# Patient Record
Sex: FEMALE | Race: BLACK OR AFRICAN AMERICAN | Employment: FULL TIME | URBAN - METROPOLITAN AREA
[De-identification: names, ages, dates, MRNs, and addresses within clinical notes are randomized per-mention and may not be internally consistent; named-entity substitution may affect disease eponyms.]

---

## 2017-01-04 ENCOUNTER — TELEPHONE (OUTPATIENT)
Dept: OBGYN CLINIC | Facility: CLINIC | Age: 42
End: 2017-01-04

## 2017-01-04 DIAGNOSIS — Z12.31 ENCOUNTER FOR SCREENING MAMMOGRAM FOR MALIGNANT NEOPLASM OF BREAST: Primary | ICD-10-CM

## 2017-01-21 NOTE — LETTER
Date: 3/13/2018    Patient Name: Tatum Buck          To Whom it may concern: This letter has been written at the patient's request. The above patient was seen at the [facility] for treatment of a medical condition.     This patient no

## 2017-01-24 ENCOUNTER — HOSPITAL ENCOUNTER (OUTPATIENT)
Dept: MAMMOGRAPHY | Facility: HOSPITAL | Age: 42
Discharge: HOME | End: 2017-01-24
Attending: OBSTETRICS & GYNECOLOGY
Payer: COMMERCIAL

## 2017-01-24 DIAGNOSIS — Z12.31 ENCOUNTER FOR SCREENING MAMMOGRAM FOR MALIGNANT NEOPLASM OF BREAST: ICD-10-CM

## 2017-01-24 PROCEDURE — 77067 SCR MAMMO BI INCL CAD: CPT

## 2017-02-08 ENCOUNTER — HOSPITAL ENCOUNTER (OUTPATIENT)
Dept: GENERAL RADIOLOGY | Age: 42
Discharge: HOME | End: 2017-02-08
Attending: FAMILY MEDICINE
Payer: COMMERCIAL

## 2017-02-08 ENCOUNTER — APPOINTMENT (OUTPATIENT)
Dept: LAB | Facility: REFERENCE LAB | Age: 42
End: 2017-02-08
Attending: FAMILY MEDICINE
Payer: COMMERCIAL

## 2017-02-08 ENCOUNTER — OFFICE VISIT (OUTPATIENT)
Dept: OBGYN CLINIC | Facility: CLINIC | Age: 42
End: 2017-02-08

## 2017-02-08 VITALS
SYSTOLIC BLOOD PRESSURE: 116 MMHG | RESPIRATION RATE: 16 BRPM | WEIGHT: 191 LBS | HEART RATE: 69 BPM | BODY MASS INDEX: 31 KG/M2 | DIASTOLIC BLOOD PRESSURE: 70 MMHG | TEMPERATURE: 98 F

## 2017-02-08 DIAGNOSIS — G89.29 CHRONIC PAIN OF BOTH KNEES: ICD-10-CM

## 2017-02-08 DIAGNOSIS — M54.50 CHRONIC BILATERAL LOW BACK PAIN WITHOUT SCIATICA: ICD-10-CM

## 2017-02-08 DIAGNOSIS — M72.2 PLANTAR FASCIITIS OF LEFT FOOT: ICD-10-CM

## 2017-02-08 DIAGNOSIS — M25.562 CHRONIC PAIN OF BOTH KNEES: ICD-10-CM

## 2017-02-08 DIAGNOSIS — M25.561 CHRONIC PAIN OF BOTH KNEES: ICD-10-CM

## 2017-02-08 DIAGNOSIS — M77.42 METATARSALGIA OF LEFT FOOT: ICD-10-CM

## 2017-02-08 DIAGNOSIS — K64.4 EXTERNAL HEMORRHOIDS: Primary | ICD-10-CM

## 2017-02-08 DIAGNOSIS — G89.29 CHRONIC BILATERAL LOW BACK PAIN WITHOUT SCIATICA: ICD-10-CM

## 2017-02-08 DIAGNOSIS — B35.1 ONYCHOMYCOSIS: ICD-10-CM

## 2017-02-08 LAB
ALBUMIN SERPL BCP-MCNC: 3.5 G/DL (ref 3.5–4.8)
ALBUMIN/GLOB SERPL: 1 {RATIO} (ref 1–2)
ALP SERPL-CCNC: 67 U/L (ref 32–100)
ALT SERPL-CCNC: 15 U/L (ref 14–54)
ANION GAP SERPL CALC-SCNC: 7 MMOL/L (ref 0–18)
AST SERPL-CCNC: 21 U/L (ref 15–41)
BILIRUB SERPL-MCNC: 0.6 MG/DL (ref 0.3–1.2)
BUN SERPL-MCNC: 7 MG/DL (ref 8–20)
BUN/CREAT SERPL: 8.8 (ref 10–20)
CALCIUM SERPL-MCNC: 9 MG/DL (ref 8.5–10.5)
CHLORIDE SERPL-SCNC: 102 MMOL/L (ref 95–110)
CO2 SERPL-SCNC: 29 MMOL/L (ref 22–32)
CREAT SERPL-MCNC: 0.8 MG/DL (ref 0.5–1.5)
GLOBULIN PLAS-MCNC: 3.6 G/DL (ref 2.5–3.7)
GLUCOSE SERPL-MCNC: 82 MG/DL (ref 70–99)
OSMOLALITY UR CALC.SUM OF ELEC: 283 MOSM/KG (ref 275–295)
POTASSIUM SERPL-SCNC: 3.4 MMOL/L (ref 3.3–5.1)
PROT SERPL-MCNC: 7.1 G/DL (ref 5.9–8.4)
SODIUM SERPL-SCNC: 138 MMOL/L (ref 136–144)

## 2017-02-08 PROCEDURE — 80053 COMPREHEN METABOLIC PANEL: CPT | Performed by: FAMILY MEDICINE

## 2017-02-08 PROCEDURE — 73560 X-RAY EXAM OF KNEE 1 OR 2: CPT

## 2017-02-08 PROCEDURE — 99203 OFFICE O/P NEW LOW 30 MIN: CPT | Performed by: FAMILY MEDICINE

## 2017-02-08 NOTE — PROGRESS NOTES
CC:  Patient presents with:  New Patient: hemorrhoids, feet problem, and back pain due to large breast      HPI: 46year old female here to discuss hemorrhoids, feet problems, and back pain.   Reports her breasts are very large and therefore she gets back p knee swelling with right knee pain going up and down stairs. No prior injuries.      ROS:  General:  No fever, no fatigue, no weight changes  GI:  No N/V/D, chronic constipation, hemorrhoids, no hematochezia or melena  Dermatologic:  Right toenail fungus bleeding, external hemorrhoids   Dermatologic:  Right toe onychomycosis   MSK: Increased lumbar lordosis, upper trapezius and lower lumbar paraspinal vertebral musculature hypertonicity. Left plantar fascia tenderness to palpation, no calf tenderness.  Tend and wear orthotics, especially while at work  - Also to avoid high heels as this will worsen the problem  - If no improvement with above measures, plan x-ray and possible podiatry referral    Return for annual physical or sooner as needed.      Galaviz Sides,

## 2017-02-09 NOTE — PATIENT INSTRUCTIONS
Treating Plantar Fasciitis  First, your healthcare provider tries to determine the cause of your problem in order to suggest ways to relieve pain. If your pain is due to poor foot mechanics, custom-made shoe inserts (orthoses) may help.     Reduce symptom

## 2017-02-10 ENCOUNTER — TELEPHONE (OUTPATIENT)
Dept: OBGYN CLINIC | Facility: CLINIC | Age: 42
End: 2017-02-10

## 2017-02-10 DIAGNOSIS — B35.1 ONYCHOMYCOSIS OF RIGHT GREAT TOE: Primary | ICD-10-CM

## 2017-02-10 RX ORDER — TERBINAFINE HYDROCHLORIDE 250 MG/1
250 TABLET ORAL DAILY
Qty: 90 TABLET | Refills: 0 | Status: SHIPPED | OUTPATIENT
Start: 2017-02-10 | End: 2017-02-24

## 2017-02-21 ENCOUNTER — TELEPHONE (OUTPATIENT)
Dept: OBGYN CLINIC | Facility: CLINIC | Age: 42
End: 2017-02-21

## 2017-02-22 ENCOUNTER — TELEPHONE (OUTPATIENT)
Dept: OBGYN CLINIC | Facility: CLINIC | Age: 42
End: 2017-02-22

## 2017-02-22 NOTE — TELEPHONE ENCOUNTER
Received a return of [facility] paperwork that was filled out two weeks ago asking for one more date patient was treated for current condition as patient must have been seen twice prior to approval of [facility].  Listed April 2016 as a separate date as she was seen by

## 2017-02-24 ENCOUNTER — TELEPHONE (OUTPATIENT)
Dept: OBGYN CLINIC | Facility: CLINIC | Age: 42
End: 2017-02-24

## 2017-02-24 RX ORDER — TERBINAFINE HYDROCHLORIDE 250 MG/1
250 TABLET ORAL DAILY
Qty: 90 TABLET | Refills: 0 | Status: SHIPPED | OUTPATIENT
Start: 2017-02-24 | End: 2017-05-25

## 2017-02-24 NOTE — TELEPHONE ENCOUNTER
Sent Terbinafine into [facility] instead of [facility] [facility] or [facility] as prior authorization was denied and it is only $10 at [facility]. Patient notified and will go pick it up soon.

## 2017-03-28 ENCOUNTER — HOSPITAL ENCOUNTER (OUTPATIENT)
Age: 42
Discharge: HOME | End: 2017-03-28
Payer: COMMERCIAL

## 2017-03-28 VITALS
RESPIRATION RATE: 20 BRPM | TEMPERATURE: 98 F | DIASTOLIC BLOOD PRESSURE: 58 MMHG | HEART RATE: 63 BPM | SYSTOLIC BLOOD PRESSURE: 118 MMHG | OXYGEN SATURATION: 100 %

## 2017-03-28 DIAGNOSIS — J06.9 VIRAL UPPER RESPIRATORY INFECTION: ICD-10-CM

## 2017-03-28 DIAGNOSIS — J02.9 ACUTE VIRAL PHARYNGITIS: Primary | ICD-10-CM

## 2017-03-28 LAB — S PYO AG THROAT QL: NEGATIVE

## 2017-03-28 PROCEDURE — 99212 OFFICE O/P EST SF 10 MIN: CPT

## 2017-03-28 PROCEDURE — 87430 STREP A AG IA: CPT

## 2017-03-28 PROCEDURE — 99202 OFFICE O/P NEW SF 15 MIN: CPT

## 2017-03-28 NOTE — ED PROVIDER NOTES
Patient Seen in: [address]    History   Patient presents with:  Cough/URI    Stated Complaint: sore throat, body aches    HPI    Patient is a 29-year-old female without contributory past medical history presents with co Triage Vitals   BP 03/28/17 1813 118/58 mmHg   Pulse 03/28/17 1813 63   Resp 03/28/17 1813 20   Temp 03/28/17 1813 98.2 °F (36.8 °C)   Temp src 03/28/17 1813 Oral   SpO2 03/28/17 1813 100 %   O2 Device 03/28/17 1813 None (Room air)       Current:/58

## 2017-03-28 NOTE — ED INITIAL ASSESSMENT (HPI)
Patient complains of upper respiratory symptoms, cough, congestion, sore throat, aches, diarrhea for the past two days. Denies fevers, nausea, vomiting.

## 2017-05-05 PROBLEM — K64.4 EXTERNAL HEMORRHOIDS WITH COMPLICATION: Status: ACTIVE | Noted: 2017-05-05

## 2017-05-05 PROBLEM — K62.5 RECTAL BLEEDING: Status: ACTIVE | Noted: 2017-05-05

## 2017-05-05 PROBLEM — K64.8 INTERNAL HEMORRHOIDS WITH COMPLICATION: Status: ACTIVE | Noted: 2017-05-05

## 2017-05-21 ENCOUNTER — HOSPITAL ENCOUNTER (OUTPATIENT)
Age: 42
Discharge: HOME | End: 2017-05-21
Attending: EMERGENCY MEDICINE
Payer: COMMERCIAL

## 2017-05-21 VITALS
TEMPERATURE: 98 F | BODY MASS INDEX: 30.86 KG/M2 | OXYGEN SATURATION: 100 % | DIASTOLIC BLOOD PRESSURE: 75 MMHG | HEART RATE: 71 BPM | RESPIRATION RATE: 20 BRPM | WEIGHT: 192 LBS | SYSTOLIC BLOOD PRESSURE: 109 MMHG | HEIGHT: 66 IN

## 2017-05-21 DIAGNOSIS — N30.00 ACUTE CYSTITIS WITHOUT HEMATURIA: Primary | ICD-10-CM

## 2017-05-21 PROCEDURE — 81025 URINE PREGNANCY TEST: CPT

## 2017-05-21 PROCEDURE — 99214 OFFICE O/P EST MOD 30 MIN: CPT

## 2017-05-21 PROCEDURE — 99213 OFFICE O/P EST LOW 20 MIN: CPT

## 2017-05-21 PROCEDURE — 81002 URINALYSIS NONAUTO W/O SCOPE: CPT

## 2017-05-21 RX ORDER — SULFAMETHOXAZOLE AND TRIMETHOPRIM 800; 160 MG/1; MG/1
1 TABLET ORAL 2 TIMES DAILY
Qty: 6 TABLET | Refills: 0 | Status: SHIPPED | OUTPATIENT
Start: 2017-05-21 | End: 2017-05-24

## 2017-05-21 RX ORDER — PHENAZOPYRIDINE HYDROCHLORIDE 200 MG/1
200 TABLET, FILM COATED ORAL 3 TIMES DAILY PRN
Qty: 6 TABLET | Refills: 0 | Status: SHIPPED | OUTPATIENT
Start: 2017-05-21 | End: 2017-05-28

## 2017-05-21 RX ORDER — SULFAMETHOXAZOLE AND TRIMETHOPRIM 800; 160 MG/1; MG/1
1 TABLET ORAL 2 TIMES DAILY
Qty: 10 TABLET | Refills: 0 | Status: SHIPPED | OUTPATIENT
Start: 2017-05-21 | End: 2017-05-21

## 2017-05-21 NOTE — ED INITIAL ASSESSMENT (HPI)
Pt rpts dysuria, hematuria, and low abd pain  Since last night.  Denies fevers, chills, back pain, or vag d/c

## 2017-05-21 NOTE — ED PROVIDER NOTES
Patient Seen in: [address]    History   Patient presents with:  Urinary Symptoms (urologic)    Stated Complaint: Possible UTI    HPI    Patient presents to the urgent care center today complaining of 24 hours of frequen BP 05/21/17 1008 109/75 mmHg   Pulse 05/21/17 1008 71   Resp 05/21/17 1008 20   Temp 05/21/17 1008 98.3 °F (36.8 °C)   Temp src 05/21/17 1008 Oral   SpO2 05/21/17 1008 100 %   O2 Device 05/21/17 1008 None (Room air)       Current:/75 mmHg  Pulse 71 Tab per tablet  Take 1 tablet by mouth 2 (two) times daily. Qty: 10 tablet Refills: 0    Phenazopyridine HCl 200 MG Oral Tab  Take 1 tablet (200 mg total) by mouth 3 (three) times daily as needed for Pain.   Qty: 6 tablet Refills: 0

## 2017-06-07 ENCOUNTER — OFFICE VISIT (OUTPATIENT)
Dept: FAMILY MEDICINE CLINIC | Facility: CLINIC | Age: 42
End: 2017-06-07

## 2017-06-07 VITALS
BODY MASS INDEX: 31.98 KG/M2 | DIASTOLIC BLOOD PRESSURE: 72 MMHG | RESPIRATION RATE: 16 BRPM | SYSTOLIC BLOOD PRESSURE: 132 MMHG | HEIGHT: 66 IN | WEIGHT: 199 LBS | HEART RATE: 78 BPM | OXYGEN SATURATION: 98 %

## 2017-06-07 DIAGNOSIS — M77.42 METATARSALGIA OF LEFT FOOT: ICD-10-CM

## 2017-06-07 DIAGNOSIS — Z87.440 HISTORY OF UTI: ICD-10-CM

## 2017-06-07 DIAGNOSIS — M72.2 PLANTAR FASCIITIS, LEFT: Primary | ICD-10-CM

## 2017-06-07 PROCEDURE — 99213 OFFICE O/P EST LOW 20 MIN: CPT | Performed by: FAMILY MEDICINE

## 2017-06-07 RX ORDER — TERBINAFINE HYDROCHLORIDE 250 MG/1
250 TABLET ORAL DAILY
COMMUNITY
End: 2017-11-15 | Stop reason: ALTCHOICE

## 2017-06-07 NOTE — PROGRESS NOTES
CC:  Patient presents with: Follow - Up: UTI on 5/21/17-urine is still cloudy  Foot Pain: left foot, would like a referral to a specialist      HPI: 38year old female here to follow-up on UTI from 5/21 and with persistent left foot pain.   Took Bactrim fo Rfl:        Review of patient's allergies indicates no known allergies. Vitals:    06/07/17  1750   BP: 132/72   Pulse: 78   Resp: 16   Height: 66\"   Weight: 199 lb   SpO2: 98%       Body mass index is 32.13 kg/(m^2).     Physical:  General:  Alert, a dipstick    Return for physical in July or sooner as needed.      Romel Uriarte DO  06/07/2017  6:06 PM

## 2017-06-19 ENCOUNTER — HOSPITAL ENCOUNTER (OUTPATIENT)
Age: 42
Discharge: HOME | End: 2017-06-19
Attending: FAMILY MEDICINE
Payer: COMMERCIAL

## 2017-06-19 VITALS
HEART RATE: 95 BPM | RESPIRATION RATE: 20 BRPM | SYSTOLIC BLOOD PRESSURE: 116 MMHG | DIASTOLIC BLOOD PRESSURE: 82 MMHG | TEMPERATURE: 98 F | OXYGEN SATURATION: 98 %

## 2017-06-19 DIAGNOSIS — J02.0 STREPTOCOCCAL SORE THROAT: Primary | ICD-10-CM

## 2017-06-19 PROCEDURE — 99213 OFFICE O/P EST LOW 20 MIN: CPT

## 2017-06-19 PROCEDURE — 99214 OFFICE O/P EST MOD 30 MIN: CPT

## 2017-06-19 PROCEDURE — 87430 STREP A AG IA: CPT

## 2017-06-19 RX ORDER — PREDNISONE 20 MG/1
40 TABLET ORAL DAILY
Qty: 10 TABLET | Refills: 0 | Status: SHIPPED | OUTPATIENT
Start: 2017-06-19 | End: 2017-06-24

## 2017-06-19 RX ORDER — ACETAMINOPHEN AND CODEINE PHOSPHATE 300; 30 MG/1; MG/1
1-2 TABLET ORAL EVERY 4 HOURS PRN
Qty: 20 TABLET | Refills: 0 | Status: SHIPPED | OUTPATIENT
Start: 2017-06-19 | End: 2017-06-26

## 2017-06-19 RX ORDER — CEFDINIR 300 MG/1
300 CAPSULE ORAL 2 TIMES DAILY
Qty: 20 CAPSULE | Refills: 0 | Status: SHIPPED | OUTPATIENT
Start: 2017-06-19 | End: 2017-06-29

## 2017-06-20 NOTE — ED PROVIDER NOTES
Patient Seen in: [address]    History   Patient presents with:  Sore Throat    Stated Complaint: SORE THROAT    HPI    Patient here with sore throat for 2 days. No travel, denies sick contacts .   Patient denies sig lashay 1940 98 %   O2 Device 06/19/17 1940 None (Room air)       Current:/82 mmHg  Pulse 95  Temp(Src) 98.4 °F (36.9 °C) (Oral)  Resp 20  SpO2 98%  LMP 05/06/2017 (Exact Date)    GENERAL: well developed, well nourished, well hydrated, no distress  HEAD: nor

## 2017-06-20 NOTE — ED INITIAL ASSESSMENT (HPI)
Patient comes in with complaint of sore throat which she felt started on Saturday. Associated fever and chills yesterday. Denies other respiratory symptoms.

## 2017-07-10 ENCOUNTER — HOSPITAL ENCOUNTER (OUTPATIENT)
Age: 42
Discharge: HOME | End: 2017-07-10
Payer: COMMERCIAL

## 2017-07-10 VITALS
DIASTOLIC BLOOD PRESSURE: 80 MMHG | HEART RATE: 67 BPM | OXYGEN SATURATION: 100 % | TEMPERATURE: 99 F | RESPIRATION RATE: 19 BRPM | SYSTOLIC BLOOD PRESSURE: 122 MMHG

## 2017-07-10 DIAGNOSIS — J32.0 RIGHT MAXILLARY SINUSITIS: Primary | ICD-10-CM

## 2017-07-10 LAB — S PYO AG THROAT QL: NEGATIVE

## 2017-07-10 PROCEDURE — 99213 OFFICE O/P EST LOW 20 MIN: CPT

## 2017-07-10 PROCEDURE — 87430 STREP A AG IA: CPT

## 2017-07-10 PROCEDURE — 99214 OFFICE O/P EST MOD 30 MIN: CPT

## 2017-07-10 RX ORDER — PREDNISONE 20 MG/1
40 TABLET ORAL DAILY
Qty: 8 TABLET | Refills: 0 | Status: SHIPPED | OUTPATIENT
Start: 2017-07-10 | End: 2017-07-14

## 2017-07-10 RX ORDER — CEFDINIR 300 MG/1
300 CAPSULE ORAL 2 TIMES DAILY
Qty: 20 CAPSULE | Refills: 0 | Status: SHIPPED | OUTPATIENT
Start: 2017-07-10 | End: 2017-07-20

## 2017-07-10 NOTE — ED INITIAL ASSESSMENT (HPI)
Pt here with complaints of sore throat , pt states she just had strep throat last week and finished abx, pt states she has been feeling sluggish, pt states it feels \"tight\" when she swallows , pt is having a productive cough

## 2017-07-11 NOTE — ED PROVIDER NOTES
Patient Seen in: [address]    History   Patient presents with:  Sore Throat    Stated Complaint: Sore throat, cough    HPI    The patient is a 53-year-old female who presents with complaints of sore throat, cough, green cough  Other systems are as noted in HPI. Constitutional and vital signs reviewed. All other systems reviewed and negative except as noted above. PSFH elements reviewed from today and agreed except as otherwise stated in HPI.     Physical Exam   ED daily.  Qty: 8 tablet Refills: 0

## 2017-07-18 ENCOUNTER — OFFICE VISIT (OUTPATIENT)
Dept: FAMILY MEDICINE CLINIC | Facility: CLINIC | Age: 42
End: 2017-07-18

## 2017-07-18 VITALS
DIASTOLIC BLOOD PRESSURE: 80 MMHG | WEIGHT: 196 LBS | HEART RATE: 79 BPM | OXYGEN SATURATION: 98 % | BODY MASS INDEX: 31.5 KG/M2 | SYSTOLIC BLOOD PRESSURE: 132 MMHG | RESPIRATION RATE: 18 BRPM | HEIGHT: 66 IN

## 2017-07-18 DIAGNOSIS — S46.812A STRAIN OF LEFT TRAPEZIUS MUSCLE, INITIAL ENCOUNTER: Primary | ICD-10-CM

## 2017-07-18 PROCEDURE — 99213 OFFICE O/P EST LOW 20 MIN: CPT | Performed by: FAMILY MEDICINE

## 2017-07-18 RX ORDER — METHYLPREDNISOLONE 4 MG/1
TABLET ORAL
Qty: 1 KIT | Refills: 0 | Status: SHIPPED | OUTPATIENT
Start: 2017-07-18 | End: 2017-11-15 | Stop reason: ALTCHOICE

## 2017-07-18 RX ORDER — CYCLOBENZAPRINE HCL 10 MG
10 TABLET ORAL NIGHTLY PRN
Qty: 10 TABLET | Refills: 0 | Status: SHIPPED | OUTPATIENT
Start: 2017-07-18 | End: 2017-07-25

## 2017-07-18 NOTE — PROGRESS NOTES
CC:  Patient presents with:  Back Pain: pain started yesterday after picking a watermelon. pain is constant and gets worse with any movement      HPI: 38year old female here with acute onset low back pain that has been constant since injury yesterday.   Be total) by mouth nightly as needed for Muscle spasms. Disp: 10 tablet Rfl: 0   cefdinir 300 MG Oral Cap Take 1 capsule (300 mg total) by mouth 2 (two) times daily. Disp: 20 capsule Rfl: 0   Terbinafine HCl 250 MG Oral Tab Take 250 mg by mouth daily.  Disp:

## 2017-07-18 NOTE — PATIENT INSTRUCTIONS
Self-Care for Strains and Sprains  Most minor strains and sprains can be treated with self-care. Recovering from a strain or sprain may take 6 to 8 weeks. Your self-care goal is to reduce pain and immobilize the injury to speed healing.      A sprain inju · Pain increases or doesn’t improve in 4 days. · When pressing along the injured area, you notice a spot that is especially painful. Date Last Reviewed: 9/29/2015  © 8255-8136 The [facility], [address] [place] [address].  All rig

## 2017-08-16 ENCOUNTER — TELEPHONE (OUTPATIENT)
Dept: FAMILY MEDICINE CLINIC | Facility: CLINIC | Age: 42
End: 2017-08-16

## 2017-08-16 PROBLEM — K62.5 RECTAL BLEEDING: Status: RESOLVED | Noted: 2017-05-05 | Resolved: 2017-08-16

## 2017-08-17 NOTE — TELEPHONE ENCOUNTER
Mom brought in [place] paperwork to be home with her son intermittently when he is sick. Forms filled out, faxed, and put in envelope for dad to  when he brings their son, Ghulam Bain, in for PPD reading on Saturday, 8/19.

## 2017-09-08 ENCOUNTER — OFFICE VISIT (OUTPATIENT)
Dept: OTOLARYNGOLOGY | Facility: CLINIC | Age: 42
End: 2017-09-08

## 2017-09-08 VITALS
HEIGHT: 66 IN | BODY MASS INDEX: 30.86 KG/M2 | TEMPERATURE: 98 F | DIASTOLIC BLOOD PRESSURE: 74 MMHG | WEIGHT: 192 LBS | SYSTOLIC BLOOD PRESSURE: 110 MMHG

## 2017-09-08 DIAGNOSIS — J02.9 SORE THROAT: Primary | ICD-10-CM

## 2017-09-08 PROCEDURE — 99243 OFF/OP CNSLTJ NEW/EST LOW 30: CPT | Performed by: OTOLARYNGOLOGY

## 2017-09-08 PROCEDURE — 99212 OFFICE O/P EST SF 10 MIN: CPT | Performed by: OTOLARYNGOLOGY

## 2017-09-08 RX ORDER — MONTELUKAST SODIUM 10 MG/1
10 TABLET ORAL NIGHTLY
Qty: 30 TABLET | Refills: 3 | Status: SHIPPED | OUTPATIENT
Start: 2017-09-08 | End: 2018-06-27

## 2017-09-08 RX ORDER — FLUTICASONE PROPIONATE 50 MCG
1 SPRAY, SUSPENSION (ML) NASAL
Status: ON HOLD | COMMUNITY
Start: 2015-12-21 | End: 2018-06-28

## 2017-09-08 RX ORDER — PROMETHAZINE HYDROCHLORIDE AND CODEINE PHOSPHATE 6.25; 1 MG/5ML; MG/5ML
2.5 SYRUP ORAL EVERY 4 HOURS PRN
Qty: 250 ML | Refills: 0 | Status: SHIPPED | OUTPATIENT
Start: 2017-09-08 | End: 2017-11-15 | Stop reason: ALTCHOICE

## 2017-09-08 RX ORDER — FLUTICASONE PROPIONATE 50 MCG
1 SPRAY, SUSPENSION (ML) NASAL 2 TIMES DAILY
Qty: 1 BOTTLE | Refills: 3 | Status: SHIPPED | OUTPATIENT
Start: 2017-09-08 | End: 2017-11-15

## 2017-09-08 NOTE — PROGRESS NOTES
Isac Tubbs is a 38year old female. Patient presents with:  Snoring  Tonsil Problem: enlarged  tonsil,       HISTORY OF PRESENT ILLNESS    She presents with problems since March of this year with chronic throat discomfort.   She is having paroxysmal BANDING INTERAL HEMORRHOID      REVIEW OF SYSTEMS    System Neg/Pos Details   Constitutional Negative Fatigue, fever and weight loss. ENMT Negative Drooling. Eyes Negative Blurred vision and vision changes. Respiratory Negative Dyspnea and wheezing. Normal Left: Normal. Septum -Normal  Turbinates - Right: Normal, Left: Normal.  Nasal mucosa–congested       Current Outpatient Prescriptions:   •  Fluticasone Propionate 50 MCG/ACT Nasal Suspension, 1 spray by Nasal route., Disp: , Rfl:   •  promethazine-

## 2017-11-07 ENCOUNTER — HOSPITAL ENCOUNTER (OUTPATIENT)
Age: 42
Discharge: HOME | End: 2017-11-07
Attending: FAMILY MEDICINE
Payer: COMMERCIAL

## 2017-11-07 VITALS
TEMPERATURE: 98 F | SYSTOLIC BLOOD PRESSURE: 113 MMHG | WEIGHT: 202 LBS | OXYGEN SATURATION: 100 % | DIASTOLIC BLOOD PRESSURE: 76 MMHG | HEIGHT: 66 IN | HEART RATE: 59 BPM | RESPIRATION RATE: 12 BRPM | BODY MASS INDEX: 32.47 KG/M2

## 2017-11-07 DIAGNOSIS — R51.9 NONINTRACTABLE EPISODIC HEADACHE, UNSPECIFIED HEADACHE TYPE: Primary | ICD-10-CM

## 2017-11-07 DIAGNOSIS — J01.10 ACUTE NON-RECURRENT FRONTAL SINUSITIS: ICD-10-CM

## 2017-11-07 PROCEDURE — 99214 OFFICE O/P EST MOD 30 MIN: CPT

## 2017-11-07 PROCEDURE — 99213 OFFICE O/P EST LOW 20 MIN: CPT

## 2017-11-07 RX ORDER — AMOXICILLIN AND CLAVULANATE POTASSIUM 875; 125 MG/1; MG/1
1 TABLET, FILM COATED ORAL 2 TIMES DAILY
Qty: 20 TABLET | Refills: 0 | Status: SHIPPED | OUTPATIENT
Start: 2017-11-07 | End: 2017-11-17

## 2017-11-08 NOTE — ED PROVIDER NOTES
Patient Seen in: [address]    History   Patient presents with:  Headache (neurologic)    Stated Complaint: headache/dry mouth    HPI    38year old patient with PMHx significant for allergies presents with nasal congestio every 4 (four) hours as needed for cough. methylPREDNISolone (MEDROL) 4 MG Oral Tablet Therapy Pack,  As directed. Terbinafine HCl 250 MG Oral Tab,  Take 250 mg by mouth daily.    Multiple Vitamins-Minerals (CENTRUM) Oral Tab,  Take 1 tablet by mouth da not need any. Is scared to take it. Discussed concerns however patient still declines. Recommended close follow-up with her primary especially if headaches persist.  If they get worse or new symptoms develop instructed to go to the ER.     Patient verbal

## 2017-11-08 NOTE — ED NOTES
Discharge instructions reviewed with patient. Prescription sent to pharmacy. All questions answered to patient's satisfaction.

## 2017-11-08 NOTE — ED INITIAL ASSESSMENT (HPI)
Patient comes in with headache which has been going on for over a month. She thinks it may be sinus related. Also complains of a dry throat.  States she drinks and urinates per usual.

## 2017-11-15 ENCOUNTER — OFFICE VISIT (OUTPATIENT)
Dept: FAMILY MEDICINE CLINIC | Facility: CLINIC | Age: 42
End: 2017-11-15

## 2017-11-15 VITALS
WEIGHT: 203 LBS | OXYGEN SATURATION: 98 % | SYSTOLIC BLOOD PRESSURE: 112 MMHG | HEART RATE: 77 BPM | DIASTOLIC BLOOD PRESSURE: 74 MMHG | HEIGHT: 66 IN | BODY MASS INDEX: 32.62 KG/M2

## 2017-11-15 DIAGNOSIS — M54.50 CHRONIC BILATERAL LOW BACK PAIN WITHOUT SCIATICA: ICD-10-CM

## 2017-11-15 DIAGNOSIS — M77.11 LATERAL EPICONDYLITIS OF RIGHT ELBOW: ICD-10-CM

## 2017-11-15 DIAGNOSIS — G89.29 CHRONIC BILATERAL LOW BACK PAIN WITHOUT SCIATICA: ICD-10-CM

## 2017-11-15 DIAGNOSIS — N62 LARGE BREASTS: Primary | ICD-10-CM

## 2017-11-15 DIAGNOSIS — Z23 NEED FOR INFLUENZA VACCINATION: ICD-10-CM

## 2017-11-15 PROCEDURE — 99213 OFFICE O/P EST LOW 20 MIN: CPT | Performed by: FAMILY MEDICINE

## 2017-11-15 NOTE — PROGRESS NOTES
CC:  Patient presents with:  Back Pain: upper and lower back, would like to discuss breast reduction  Elbow Pain: right pain started after hurting arm with a door x1 month      HPI: 38year old female here to discuss back pain and goal for breast reduction nightly. Disp: 30 tablet Rfl: 3   Loratadine-Pseudoephedrine ER 5-120 MG Oral Tablet 12 Hr Take 1 tablet by mouth every 12 (twelve) hours. Disp: 60 tablet Rfl: 3   Multiple Vitamins-Minerals (CENTRUM) Oral Tab Take 1 tablet by mouth daily.  Disp:  Rfl:

## 2017-11-15 NOTE — PATIENT INSTRUCTIONS
Tendonitis  A tendon is the thick fibrous cord that joins muscle to bone and allows joints to move. When a tendon becomes inflamed, it is called tendonitis. This can occur from overuse, injury, or infection.  This usually involves the shoulders, forearm, © 3874-7447 The [address] [facility], [facility] [place]. All rights reserved. This information is not intended as a substitute for professional medical care. Always follow your healthcare professional's instructions.

## 2018-01-29 ENCOUNTER — OFFICE VISIT (OUTPATIENT)
Dept: FAMILY MEDICINE CLINIC | Facility: CLINIC | Age: 43
End: 2018-01-29

## 2018-01-29 ENCOUNTER — HOSPITAL ENCOUNTER (OUTPATIENT)
Dept: GENERAL RADIOLOGY | Age: 43
Discharge: HOME | End: 2018-01-29
Attending: FAMILY MEDICINE
Payer: COMMERCIAL

## 2018-01-29 VITALS — OXYGEN SATURATION: 98 % | HEART RATE: 67 BPM | SYSTOLIC BLOOD PRESSURE: 122 MMHG | DIASTOLIC BLOOD PRESSURE: 74 MMHG

## 2018-01-29 DIAGNOSIS — M25.511 ACUTE PAIN OF RIGHT SHOULDER: Primary | ICD-10-CM

## 2018-01-29 DIAGNOSIS — M25.511 ACUTE PAIN OF RIGHT SHOULDER: ICD-10-CM

## 2018-01-29 PROCEDURE — 99213 OFFICE O/P EST LOW 20 MIN: CPT | Performed by: FAMILY MEDICINE

## 2018-01-29 PROCEDURE — 73030 X-RAY EXAM OF SHOULDER: CPT | Performed by: FAMILY MEDICINE

## 2018-01-29 RX ORDER — NAPROXEN 500 MG/1
500 TABLET ORAL 2 TIMES DAILY WITH MEALS
Qty: 28 TABLET | Refills: 0 | Status: SHIPPED | OUTPATIENT
Start: 2018-01-29 | End: 2018-02-12

## 2018-01-29 NOTE — PROGRESS NOTES
CC:  Patient presents with:  Shoulder Pain: right- possibly from work x3 weeks      HPI: 38year old female here with right shoulder pain x 3 weeks.   Reports she first thought this pain started due to sleeping on her right shoulder, but the pain is not goi Sexual activity: Yes    Partners: Male     Other Topics Concern   None on file     Social History Narrative   None on file         Current Outpatient Prescriptions:  Fluticasone Propionate 50 MCG/ACT Nasal Suspension 1 spray by Nasal route.  Disp:  Rfl: DO  01/29/18  5:29 PM

## 2018-01-29 NOTE — PATIENT INSTRUCTIONS
Understanding Rotator Cuff Tendonitis    Tendons are tough tissues that connect muscles to bone. A group of 4 muscles and their tendons form a “cuff” around the head of the upper arm bone. This is called the rotator cuff.  It connects the upper arm to the It might be tempting to stop using your shoulder completely to avoid pain.  But doing so may lead to a condition called “frozen shoulder.” To help prevent this, following instructions you are given for active rest and for doing exercises to help your should

## 2018-01-30 ENCOUNTER — OFFICE VISIT (OUTPATIENT)
Dept: FAMILY MEDICINE CLINIC | Facility: CLINIC | Age: 43
End: 2018-01-30

## 2018-01-30 VITALS — DIASTOLIC BLOOD PRESSURE: 70 MMHG | SYSTOLIC BLOOD PRESSURE: 118 MMHG | OXYGEN SATURATION: 98 % | HEART RATE: 87 BPM

## 2018-01-30 DIAGNOSIS — M75.81 RIGHT ROTATOR CUFF TENDINITIS: Primary | ICD-10-CM

## 2018-01-30 PROCEDURE — 20610 DRAIN/INJ JOINT/BURSA W/O US: CPT | Performed by: FAMILY MEDICINE

## 2018-01-30 RX ORDER — TRIAMCINOLONE ACETONIDE 40 MG/ML
40 INJECTION, SUSPENSION INTRA-ARTICULAR; INTRAMUSCULAR ONCE
Status: COMPLETED | OUTPATIENT
Start: 2018-01-30 | End: 2018-01-30

## 2018-01-30 RX ORDER — LIDOCAINE HYDROCHLORIDE 10 MG/ML
2 INJECTION, SOLUTION EPIDURAL; INFILTRATION; INTRACAUDAL; PERINEURAL ONCE
Status: COMPLETED | OUTPATIENT
Start: 2018-01-30 | End: 2018-01-30

## 2018-01-30 RX ADMIN — TRIAMCINOLONE ACETONIDE 40 MG: 40 INJECTION, SUSPENSION INTRA-ARTICULAR; INTRAMUSCULAR at 18:16:00

## 2018-01-30 RX ADMIN — LIDOCAINE HYDROCHLORIDE 2 ML: 10 INJECTION, SOLUTION EPIDURAL; INFILTRATION; INTRACAUDAL; PERINEURAL at 18:14:00

## 2018-01-30 NOTE — PATIENT INSTRUCTIONS
Preventing Repetitive Motion Injury: Shoulder    Making changes in how you use your shoulder can lessen your chances of repetitive motion injuries (RMIs). Use your shoulder wisely by following the tips below. Position yourself well  ? Here are suggestion

## 2018-01-30 NOTE — PROCEDURES
Joint Injection  Pre-procedure care:  Consent was obtained, Procedure/risks were explained, Questions were answered, Patient was prepped and draped in the usual sterile fashion, Correct side and site confirmed and Correct patient identified  No diagnosis

## 2018-02-05 ENCOUNTER — TELEPHONE (OUTPATIENT)
Dept: FAMILY MEDICINE CLINIC | Facility: CLINIC | Age: 43
End: 2018-02-05

## 2018-02-05 NOTE — TELEPHONE ENCOUNTER
Reports she started back to work today and is feeling some burning when she lifts her right arm overhead but otherwise the pain is bearable.  Does report her pain level improved Saturday and Sunday as she was not doing anything with it, and still trying to

## 2018-03-13 ENCOUNTER — OFFICE VISIT (OUTPATIENT)
Dept: FAMILY MEDICINE CLINIC | Facility: CLINIC | Age: 43
End: 2018-03-13

## 2018-03-13 ENCOUNTER — APPOINTMENT (OUTPATIENT)
Dept: LAB | Facility: REFERENCE LAB | Age: 43
End: 2018-03-13
Attending: FAMILY MEDICINE
Payer: COMMERCIAL

## 2018-03-13 VITALS
WEIGHT: 196 LBS | SYSTOLIC BLOOD PRESSURE: 118 MMHG | OXYGEN SATURATION: 98 % | BODY MASS INDEX: 32 KG/M2 | HEART RATE: 78 BPM | DIASTOLIC BLOOD PRESSURE: 74 MMHG

## 2018-03-13 DIAGNOSIS — Z01.818 PREOPERATIVE CLEARANCE: ICD-10-CM

## 2018-03-13 DIAGNOSIS — Z12.31 VISIT FOR SCREENING MAMMOGRAM: ICD-10-CM

## 2018-03-13 DIAGNOSIS — Z00.00 ENCOUNTER FOR ROUTINE ADULT HEALTH EXAMINATION WITHOUT ABNORMAL FINDINGS: ICD-10-CM

## 2018-03-13 DIAGNOSIS — Z00.00 ENCOUNTER FOR ROUTINE ADULT HEALTH EXAMINATION WITHOUT ABNORMAL FINDINGS: Primary | ICD-10-CM

## 2018-03-13 LAB
ALBUMIN SERPL BCP-MCNC: 3.6 G/DL (ref 3.5–4.8)
ALBUMIN/GLOB SERPL: 0.9 {RATIO} (ref 1–2)
ALP SERPL-CCNC: 61 U/L (ref 32–100)
ALT SERPL-CCNC: 14 U/L (ref 14–54)
ANION GAP SERPL CALC-SCNC: 6 MMOL/L (ref 0–18)
AST SERPL-CCNC: 23 U/L (ref 15–41)
BASOPHILS # BLD: 0.1 K/UL (ref 0–0.2)
BASOPHILS NFR BLD: 2 %
BILIRUB SERPL-MCNC: 0.5 MG/DL (ref 0.3–1.2)
BUN SERPL-MCNC: 10 MG/DL (ref 8–20)
BUN/CREAT SERPL: 12.8 (ref 10–20)
CALCIUM SERPL-MCNC: 9.1 MG/DL (ref 8.5–10.5)
CHLORIDE SERPL-SCNC: 103 MMOL/L (ref 95–110)
CHOLEST SERPL-MCNC: 203 MG/DL (ref 110–200)
CO2 SERPL-SCNC: 28 MMOL/L (ref 22–32)
CREAT SERPL-MCNC: 0.78 MG/DL (ref 0.5–1.5)
EOSINOPHIL # BLD: 0.1 K/UL (ref 0–0.7)
EOSINOPHIL NFR BLD: 2 %
ERYTHROCYTE [DISTWIDTH] IN BLOOD BY AUTOMATED COUNT: 13.1 % (ref 11–15)
GLOBULIN PLAS-MCNC: 3.8 G/DL (ref 2.5–3.7)
GLUCOSE SERPL-MCNC: 85 MG/DL (ref 70–99)
HCT VFR BLD AUTO: 37.4 % (ref 35–48)
HDLC SERPL-MCNC: 64 MG/DL
HGB BLD-MCNC: 12.5 G/DL (ref 12–16)
LDLC SERPL CALC-MCNC: 132 MG/DL (ref 0–99)
LYMPHOCYTES # BLD: 2.5 K/UL (ref 1–4)
LYMPHOCYTES NFR BLD: 41 %
MCH RBC QN AUTO: 29.5 PG (ref 27–32)
MCHC RBC AUTO-ENTMCNC: 33.4 G/DL (ref 32–37)
MCV RBC AUTO: 88.1 FL (ref 80–100)
MONOCYTES # BLD: 0.4 K/UL (ref 0–1)
MONOCYTES NFR BLD: 6 %
NEUTROPHILS # BLD AUTO: 3 K/UL (ref 1.8–7.7)
NEUTROPHILS NFR BLD: 49 %
NONHDLC SERPL-MCNC: 139 MG/DL
OSMOLALITY UR CALC.SUM OF ELEC: 282 MOSM/KG (ref 275–295)
PATIENT FASTING: NO
PLATELET # BLD AUTO: 259 K/UL (ref 140–400)
PMV BLD AUTO: 8.5 FL (ref 7.4–10.3)
POTASSIUM SERPL-SCNC: 3.8 MMOL/L (ref 3.3–5.1)
PROT SERPL-MCNC: 7.4 G/DL (ref 5.9–8.4)
RBC # BLD AUTO: 4.25 M/UL (ref 3.7–5.4)
SODIUM SERPL-SCNC: 137 MMOL/L (ref 136–144)
TRIGL SERPL-MCNC: 33 MG/DL (ref 1–149)
WBC # BLD AUTO: 6.1 K/UL (ref 4–11)

## 2018-03-13 PROCEDURE — 36415 COLL VENOUS BLD VENIPUNCTURE: CPT

## 2018-03-13 PROCEDURE — 80053 COMPREHEN METABOLIC PANEL: CPT | Performed by: FAMILY MEDICINE

## 2018-03-13 PROCEDURE — 85025 COMPLETE CBC W/AUTO DIFF WBC: CPT | Performed by: FAMILY MEDICINE

## 2018-03-13 PROCEDURE — 80061 LIPID PANEL: CPT | Performed by: FAMILY MEDICINE

## 2018-03-13 PROCEDURE — 99396 PREV VISIT EST AGE 40-64: CPT | Performed by: FAMILY MEDICINE

## 2018-03-13 PROCEDURE — 83036 HEMOGLOBIN GLYCOSYLATED A1C: CPT

## 2018-03-13 NOTE — PROGRESS NOTES
HPI:   Mack Puente is a 38year old female who presents for a complete physical exam.   Patient presents with:  Physical: tdap  Other: surgery clearance for breast reduction    Having breast reduction surgery [place] due to severe back p tablet Rfl: 3   Multiple Vitamins-Minerals (CENTRUM) Oral Tab Take 1 tablet by mouth daily.  Disp:  Rfl:      No Known Allergies   Past Medical History:   Diagnosis Date   • Fibroids       Past Surgical History:  No date: APPENDECTOMY  No date: APPENDECTOMY complete physical exam.  Encounter for routine adult health examination without abnormal findings  (primary encounter diagnosis)  Preoperative clearance  Visit for screening mammogram    Orders Placed This Encounter      Hemoglobin A1C (Glycohemoglobin) [E DO  3/13/2018  4:52 PM

## 2018-03-14 LAB — HBA1C MFR BLD: 5.5 % (ref 4–6)

## 2018-03-21 ENCOUNTER — HOSPITAL ENCOUNTER (OUTPATIENT)
Dept: MAMMOGRAPHY | Age: 43
Discharge: HOME | End: 2018-03-21
Attending: FAMILY MEDICINE
Payer: COMMERCIAL

## 2018-03-21 DIAGNOSIS — Z12.31 VISIT FOR SCREENING MAMMOGRAM: ICD-10-CM

## 2018-03-21 PROCEDURE — 77067 SCR MAMMO BI INCL CAD: CPT | Performed by: FAMILY MEDICINE

## 2018-04-16 ENCOUNTER — OFFICE VISIT (OUTPATIENT)
Dept: FAMILY MEDICINE CLINIC | Facility: CLINIC | Age: 43
End: 2018-04-16

## 2018-04-16 VITALS
OXYGEN SATURATION: 98 % | HEIGHT: 66 IN | SYSTOLIC BLOOD PRESSURE: 118 MMHG | HEART RATE: 72 BPM | BODY MASS INDEX: 29.09 KG/M2 | DIASTOLIC BLOOD PRESSURE: 74 MMHG | WEIGHT: 181 LBS

## 2018-04-16 DIAGNOSIS — Z48.89 POSTOPERATIVE VISIT: Primary | ICD-10-CM

## 2018-04-16 DIAGNOSIS — M54.50 CHRONIC BILATERAL LOW BACK PAIN WITHOUT SCIATICA: ICD-10-CM

## 2018-04-16 DIAGNOSIS — G89.29 CHRONIC BILATERAL LOW BACK PAIN WITHOUT SCIATICA: ICD-10-CM

## 2018-04-16 DIAGNOSIS — N62 LARGE BREASTS: ICD-10-CM

## 2018-04-16 PROCEDURE — 99213 OFFICE O/P EST LOW 20 MIN: CPT | Performed by: FAMILY MEDICINE

## 2018-04-16 RX ORDER — CEPHALEXIN 500 MG/1
500 CAPSULE ORAL 2 TIMES DAILY
Qty: 10 CAPSULE | Refills: 0 | Status: SHIPPED | OUTPATIENT
Start: 2018-04-16 | End: 2018-04-21

## 2018-04-16 NOTE — PROGRESS NOTES
CC:  Patient presents with: Follow - Up: had breast reduction and tummy tuck, wants to make sure everything is healing and not infected      HPI: 38year old female here to follow-up after tummy tuck and breast reduction in the [facility] 1/70.   R status:   Spouse name: N/A    Years of education: N/A  Number of children: 2     Occupational History   [employer]       Social History Main Topics   Smoking status: Never Smoker    Smokeless tobacco: Never Used    Alcohol use 1. Postoperative visit    - Doing well overall with no significant pain and no signs of infection  - Will treat with Keflex for 5 days for prevention of infection as patient uncertain about perioperative antibiotic use  - Okay to shower with her back t

## 2018-04-23 ENCOUNTER — MED REC SCAN ONLY (OUTPATIENT)
Dept: FAMILY MEDICINE CLINIC | Facility: CLINIC | Age: 43
End: 2018-04-23

## 2018-04-23 PROBLEM — G89.29 CHRONIC BILATERAL LOW BACK PAIN WITHOUT SCIATICA: Status: ACTIVE | Noted: 2018-04-23

## 2018-04-23 PROBLEM — M54.50 CHRONIC BILATERAL LOW BACK PAIN WITHOUT SCIATICA: Status: ACTIVE | Noted: 2018-04-23

## 2018-05-07 ENCOUNTER — TELEPHONE (OUTPATIENT)
Dept: FAMILY MEDICINE CLINIC | Facility: CLINIC | Age: 43
End: 2018-05-07

## 2018-05-07 NOTE — TELEPHONE ENCOUNTER
Need an appointment for wound check to see if able to go back to work. Unable to fit her in the schedule this week. Patient also needs paperwork filled out.

## 2018-05-08 NOTE — TELEPHONE ENCOUNTER
Patient schedule for Monday at 2pm. Patient asking for work note to be changed so she can go back to work on Tuesday 5/15 since she cant be seen until Monday.

## 2018-05-08 NOTE — TELEPHONE ENCOUNTER
Spoke with patient and states she has an appointment for another wound check with a nurse affiliated with her surgeon in the [place]. They did remove the stitches and packed the wound a few weeks ago and will take pictures of the wound at her visit today.  Told

## 2018-05-14 ENCOUNTER — OFFICE VISIT (OUTPATIENT)
Dept: FAMILY MEDICINE CLINIC | Facility: CLINIC | Age: 43
End: 2018-05-14

## 2018-05-14 VITALS
OXYGEN SATURATION: 98 % | HEART RATE: 81 BPM | HEIGHT: 66 IN | SYSTOLIC BLOOD PRESSURE: 128 MMHG | DIASTOLIC BLOOD PRESSURE: 74 MMHG | WEIGHT: 178 LBS | BODY MASS INDEX: 28.61 KG/M2

## 2018-05-14 DIAGNOSIS — Z98.890 POSTOPERATIVE STATE: ICD-10-CM

## 2018-05-14 DIAGNOSIS — L03.311 ABDOMINAL WALL CELLULITIS: Primary | ICD-10-CM

## 2018-05-14 PROCEDURE — 99213 OFFICE O/P EST LOW 20 MIN: CPT | Performed by: FAMILY MEDICINE

## 2018-05-14 RX ORDER — SULFAMETHOXAZOLE AND TRIMETHOPRIM 800; 160 MG/1; MG/1
1 TABLET ORAL 2 TIMES DAILY
Qty: 14 TABLET | Refills: 0 | Status: SHIPPED | OUTPATIENT
Start: 2018-05-14 | End: 2018-05-21

## 2018-05-14 RX ORDER — AMOXICILLIN AND CLAVULANATE POTASSIUM 875; 125 MG/1; MG/1
1 TABLET, FILM COATED ORAL 2 TIMES DAILY
Qty: 20 TABLET | Refills: 0 | Status: SHIPPED | OUTPATIENT
Start: 2018-05-14 | End: 2018-05-14 | Stop reason: CLARIF

## 2018-05-14 NOTE — PROGRESS NOTES
CC:  Patient presents with: Follow - Up: wound check- still has open wounds      HPI: 41year old female here to follow-up on abdominal wounds after abdominoplasty 3/28/18.   Has been seeing a wound nurse and having intermittent abdominal pain related to o Take 1 tablet by mouth daily. Disp:  Rfl:        Patient has no known allergies. Vitals:    05/14/18  1421   BP: 128/74   Pulse: 81   SpO2: 98%   Weight: 178 lb   Height: 66\"       Body mass index is 28.73 kg/m².     Physical:  General:  Alert, approp

## 2018-05-29 ENCOUNTER — OFFICE VISIT (OUTPATIENT)
Dept: FAMILY MEDICINE CLINIC | Facility: CLINIC | Age: 43
End: 2018-05-29

## 2018-05-29 VITALS
WEIGHT: 173 LBS | DIASTOLIC BLOOD PRESSURE: 74 MMHG | BODY MASS INDEX: 28 KG/M2 | HEART RATE: 72 BPM | SYSTOLIC BLOOD PRESSURE: 118 MMHG | OXYGEN SATURATION: 98 %

## 2018-05-29 DIAGNOSIS — Z98.890 HISTORY OF BILATERAL BREAST REDUCTION SURGERY: Primary | ICD-10-CM

## 2018-05-29 DIAGNOSIS — S21.009A INCISIONAL BREAST WOUND: ICD-10-CM

## 2018-05-29 DIAGNOSIS — J32.9 PURULENT POSTNASAL DRAINAGE: ICD-10-CM

## 2018-05-29 PROCEDURE — 99214 OFFICE O/P EST MOD 30 MIN: CPT | Performed by: FAMILY MEDICINE

## 2018-05-29 NOTE — PROGRESS NOTES
CC:  Patient presents with:  Wound Recheck: right breast wound is still a little open and the wound from her abdomen has closed      HPI: 41year old female here for postoperative follow-up/work clearance after breast reduction and abdominoplasty 3/28.   Pricilla Monique file         Current Outpatient Prescriptions:  Fluticasone Propionate 50 MCG/ACT Nasal Suspension 1 spray by Nasal route. Disp:  Rfl:    Montelukast Sodium 10 MG Oral Tab Take 1 tablet (10 mg total) by mouth nightly.  Disp: 30 tablet Rfl: 3   Loratadine-Ps Incisional breast wound    - Healing well with dressing changes at wound care clinic  - Continue weekly dressing changes and call or return if any new or worsening symptoms develop    3.  Purulent postnasal drainage    - Referral to ENT provided given chron

## 2018-06-12 ENCOUNTER — OFFICE VISIT (OUTPATIENT)
Dept: FAMILY MEDICINE CLINIC | Facility: CLINIC | Age: 43
End: 2018-06-12

## 2018-06-12 ENCOUNTER — LAB ENCOUNTER (OUTPATIENT)
Dept: LAB | Facility: REFERENCE LAB | Age: 43
End: 2018-06-12
Attending: FAMILY MEDICINE
Payer: COMMERCIAL

## 2018-06-12 VITALS
HEART RATE: 74 BPM | WEIGHT: 174 LBS | DIASTOLIC BLOOD PRESSURE: 70 MMHG | HEIGHT: 66 IN | SYSTOLIC BLOOD PRESSURE: 118 MMHG | TEMPERATURE: 99 F | OXYGEN SATURATION: 98 % | BODY MASS INDEX: 27.97 KG/M2

## 2018-06-12 DIAGNOSIS — R53.83 FATIGUE, UNSPECIFIED TYPE: ICD-10-CM

## 2018-06-12 DIAGNOSIS — R63.0 LACK OF APPETITE: ICD-10-CM

## 2018-06-12 DIAGNOSIS — IMO0001 SUPERFICIAL INCISIONAL INFECTION OF SURGICAL SITE, INITIAL ENCOUNTER: Primary | ICD-10-CM

## 2018-06-12 PROCEDURE — 99214 OFFICE O/P EST MOD 30 MIN: CPT | Performed by: FAMILY MEDICINE

## 2018-06-12 PROCEDURE — 82306 VITAMIN D 25 HYDROXY: CPT | Performed by: FAMILY MEDICINE

## 2018-06-12 PROCEDURE — 36415 COLL VENOUS BLD VENIPUNCTURE: CPT

## 2018-06-12 PROCEDURE — 85025 COMPLETE CBC W/AUTO DIFF WBC: CPT

## 2018-06-12 PROCEDURE — 84443 ASSAY THYROID STIM HORMONE: CPT

## 2018-06-12 PROCEDURE — 80053 COMPREHEN METABOLIC PANEL: CPT

## 2018-06-12 RX ORDER — SULFAMETHOXAZOLE AND TRIMETHOPRIM 800; 160 MG/1; MG/1
1 TABLET ORAL 2 TIMES DAILY
Qty: 20 TABLET | Refills: 0 | Status: SHIPPED | OUTPATIENT
Start: 2018-06-12 | End: 2018-06-22

## 2018-06-12 NOTE — PROGRESS NOTES
CC:  [facility]: [facility] and [facility], started on saturday      HPI: 41year old female here with concerns for umbilical wound since Saturday.   Went back to work a few weeks ago and notes her breast wound is h Fluticasone Propionate 50 MCG/ACT Nasal Suspension 1 spray by Nasal route. Disp:  Rfl:    Montelukast Sodium 10 MG Oral Tab Take 1 tablet (10 mg total) by mouth nightly.  Disp: 30 tablet Rfl: 3   Loratadine-Pseudoephedrine ER 5-120 MG Oral Tablet 12 Hr Ta type    - Check labs for further evaluation for post-operative, prolonged fatigue   - CBC WITH DIFFERENTIAL WITH PLATELET; Future  - TSH W REFLEX TO FREE T4; Future  - COMP METABOLIC PANEL (14);  Future  - VITAMIN D, 25-HYDROXY    A total of 25 minutes of t

## 2018-06-14 ENCOUNTER — TELEPHONE (OUTPATIENT)
Dept: FAMILY MEDICINE CLINIC | Facility: CLINIC | Age: 43
End: 2018-06-14

## 2018-06-14 DIAGNOSIS — E55.9 VITAMIN D DEFICIENCY: Primary | ICD-10-CM

## 2018-06-14 NOTE — TELEPHONE ENCOUNTER
Pt states she does not think she will make it this weekend, then asked if Dr. Lalo Finch can call her so she can talk to her personally.

## 2018-06-15 NOTE — TELEPHONE ENCOUNTER
Patient states she got very tired while working in the sun yesterday and had to stop her route and has been home in bed today.  Thinks it may be due to the Bactrim but states she feels better now and has no concerns with her wound, so will continue the anti

## 2018-06-15 NOTE — TELEPHONE ENCOUNTER
Attempted to return patient's call but no answer, left voicemail letting her know I will be back in the office tomorrow if that is a better time to talk.

## 2018-06-26 ENCOUNTER — OFFICE VISIT (OUTPATIENT)
Dept: FAMILY MEDICINE CLINIC | Facility: CLINIC | Age: 43
End: 2018-06-26

## 2018-06-26 VITALS
BODY MASS INDEX: 26.84 KG/M2 | HEART RATE: 78 BPM | SYSTOLIC BLOOD PRESSURE: 116 MMHG | WEIGHT: 167 LBS | OXYGEN SATURATION: 98 % | DIASTOLIC BLOOD PRESSURE: 70 MMHG | HEIGHT: 66 IN

## 2018-06-26 DIAGNOSIS — S30.1XXD ABDOMINAL WALL SEROMA, SUBSEQUENT ENCOUNTER: ICD-10-CM

## 2018-06-26 DIAGNOSIS — S31.109D OPEN WOUND OF ABDOMINAL WALL, SUBSEQUENT ENCOUNTER: Primary | ICD-10-CM

## 2018-06-26 PROCEDURE — 99214 OFFICE O/P EST MOD 30 MIN: CPT | Performed by: FAMILY MEDICINE

## 2018-06-26 NOTE — PROGRESS NOTES
CC:  Patient presents with:  Wound: states she has an open wound on her original incision site, and also has two other spots that seem like they are about to open up      HPI: 41year old female here to discuss possible incisional site wound infection.   Be Fluticasone Propionate 50 MCG/ACT Nasal Suspension 1 spray by Nasal route. Disp:  Rfl:    Montelukast Sodium 10 MG Oral Tab Take 1 tablet (10 mg total) by mouth nightly.  Disp: 30 tablet Rfl: 3   Loratadine-Pseudoephedrine ER 5-120 MG Oral Tablet 12 Hr Ta counseling and coordination of care.        Randall Mccoy DO  06/26/18  11:52 AM

## 2018-06-27 ENCOUNTER — LAB ENCOUNTER (OUTPATIENT)
Dept: LAB | Facility: HOSPITAL | Age: 43
End: 2018-06-27
Attending: SURGERY
Payer: COMMERCIAL

## 2018-06-27 DIAGNOSIS — IMO0001: ICD-10-CM

## 2018-06-27 PROCEDURE — 80053 COMPREHEN METABOLIC PANEL: CPT

## 2018-06-27 PROCEDURE — 36415 COLL VENOUS BLD VENIPUNCTURE: CPT

## 2018-06-27 PROCEDURE — 85025 COMPLETE CBC W/AUTO DIFF WBC: CPT

## 2018-06-27 RX ORDER — MONTELUKAST SODIUM 10 MG/1
10 TABLET ORAL NIGHTLY
Status: ON HOLD | COMMUNITY
End: 2018-06-28

## 2018-06-28 ENCOUNTER — SURGERY (OUTPATIENT)
Age: 43
End: 2018-06-28

## 2018-06-28 ENCOUNTER — ANESTHESIA (OUTPATIENT)
Dept: SURGERY | Facility: HOSPITAL | Age: 43
DRG: 464 | End: 2018-06-28
Payer: COMMERCIAL

## 2018-06-28 ENCOUNTER — ANESTHESIA EVENT (OUTPATIENT)
Dept: SURGERY | Facility: HOSPITAL | Age: 43
DRG: 464 | End: 2018-06-28
Payer: COMMERCIAL

## 2018-06-28 ENCOUNTER — HOSPITAL ENCOUNTER (INPATIENT)
Facility: HOSPITAL | Age: 43
LOS: 4 days | Discharge: HOME HEALTH | DRG: 464 | End: 2018-07-02
Attending: SURGERY | Admitting: HOSPITALIST
Payer: COMMERCIAL

## 2018-06-28 DIAGNOSIS — L02.211 ABDOMINAL WALL ABSCESS: ICD-10-CM

## 2018-06-28 PROBLEM — M72.6 NECROTIZING FASCIITIS (HCC): Status: ACTIVE | Noted: 2018-06-28

## 2018-06-28 LAB — B-HCG UR QL: NEGATIVE

## 2018-06-28 PROCEDURE — 99222 1ST HOSP IP/OBS MODERATE 55: CPT | Performed by: HOSPITALIST

## 2018-06-28 PROCEDURE — 0JD80ZZ EXTRACTION OF ABDOMEN SUBCUTANEOUS TISSUE AND FASCIA, OPEN APPROACH: ICD-10-PCS | Performed by: SURGERY

## 2018-06-28 PROCEDURE — 0JCR0ZZ EXTIRPATION OF MATTER FROM LEFT FOOT SUBCUTANEOUS TISSUE AND FASCIA, OPEN APPROACH: ICD-10-PCS | Performed by: SURGERY

## 2018-06-28 PROCEDURE — 0JB80ZZ EXCISION OF ABDOMEN SUBCUTANEOUS TISSUE AND FASCIA, OPEN APPROACH: ICD-10-PCS | Performed by: SURGERY

## 2018-06-28 RX ORDER — SODIUM CHLORIDE, SODIUM LACTATE, POTASSIUM CHLORIDE, CALCIUM CHLORIDE 600; 310; 30; 20 MG/100ML; MG/100ML; MG/100ML; MG/100ML
INJECTION, SOLUTION INTRAVENOUS CONTINUOUS
Status: DISCONTINUED | OUTPATIENT
Start: 2018-06-28 | End: 2018-06-28 | Stop reason: HOSPADM

## 2018-06-28 RX ORDER — HYDROMORPHONE HYDROCHLORIDE 1 MG/ML
0.8 INJECTION, SOLUTION INTRAMUSCULAR; INTRAVENOUS; SUBCUTANEOUS EVERY 2 HOUR PRN
Status: DISCONTINUED | OUTPATIENT
Start: 2018-06-28 | End: 2018-06-28

## 2018-06-28 RX ORDER — ONDANSETRON 2 MG/ML
8 INJECTION INTRAMUSCULAR; INTRAVENOUS EVERY 6 HOURS PRN
Status: DISCONTINUED | OUTPATIENT
Start: 2018-06-28 | End: 2018-07-02

## 2018-06-28 RX ORDER — METOCLOPRAMIDE 10 MG/1
10 TABLET ORAL ONCE
Status: DISCONTINUED | OUTPATIENT
Start: 2018-06-28 | End: 2018-06-28 | Stop reason: HOSPADM

## 2018-06-28 RX ORDER — DEXAMETHASONE SODIUM PHOSPHATE 4 MG/ML
VIAL (ML) INJECTION AS NEEDED
Status: DISCONTINUED | OUTPATIENT
Start: 2018-06-28 | End: 2018-06-28 | Stop reason: SURG

## 2018-06-28 RX ORDER — LIDOCAINE HYDROCHLORIDE 10 MG/ML
INJECTION, SOLUTION EPIDURAL; INFILTRATION; INTRACAUDAL; PERINEURAL AS NEEDED
Status: DISCONTINUED | OUTPATIENT
Start: 2018-06-28 | End: 2018-06-28 | Stop reason: SURG

## 2018-06-28 RX ORDER — HYDROMORPHONE HYDROCHLORIDE 1 MG/ML
0.2 INJECTION, SOLUTION INTRAMUSCULAR; INTRAVENOUS; SUBCUTANEOUS EVERY 5 MIN PRN
Status: DISCONTINUED | OUTPATIENT
Start: 2018-06-28 | End: 2018-06-28 | Stop reason: HOSPADM

## 2018-06-28 RX ORDER — HYDROMORPHONE HYDROCHLORIDE 1 MG/ML
0.4 INJECTION, SOLUTION INTRAMUSCULAR; INTRAVENOUS; SUBCUTANEOUS EVERY 2 HOUR PRN
Status: DISCONTINUED | OUTPATIENT
Start: 2018-06-28 | End: 2018-06-28

## 2018-06-28 RX ORDER — MIDAZOLAM HYDROCHLORIDE 1 MG/ML
INJECTION INTRAMUSCULAR; INTRAVENOUS AS NEEDED
Status: DISCONTINUED | OUTPATIENT
Start: 2018-06-28 | End: 2018-06-28 | Stop reason: SURG

## 2018-06-28 RX ORDER — HYDROMORPHONE HYDROCHLORIDE 1 MG/ML
0.4 INJECTION, SOLUTION INTRAMUSCULAR; INTRAVENOUS; SUBCUTANEOUS EVERY 5 MIN PRN
Status: DISCONTINUED | OUTPATIENT
Start: 2018-06-28 | End: 2018-06-28 | Stop reason: HOSPADM

## 2018-06-28 RX ORDER — HYDROCODONE BITARTRATE AND ACETAMINOPHEN 5; 325 MG/1; MG/1
2 TABLET ORAL AS NEEDED
Status: DISCONTINUED | OUTPATIENT
Start: 2018-06-28 | End: 2018-06-28 | Stop reason: HOSPADM

## 2018-06-28 RX ORDER — HALOPERIDOL 5 MG/ML
0.25 INJECTION INTRAMUSCULAR ONCE AS NEEDED
Status: DISCONTINUED | OUTPATIENT
Start: 2018-06-28 | End: 2018-06-28 | Stop reason: HOSPADM

## 2018-06-28 RX ORDER — SODIUM CHLORIDE, SODIUM LACTATE, POTASSIUM CHLORIDE, CALCIUM CHLORIDE 600; 310; 30; 20 MG/100ML; MG/100ML; MG/100ML; MG/100ML
INJECTION, SOLUTION INTRAVENOUS CONTINUOUS
Status: DISCONTINUED | OUTPATIENT
Start: 2018-06-28 | End: 2018-06-28

## 2018-06-28 RX ORDER — ACETAMINOPHEN 325 MG/1
650 TABLET ORAL EVERY 4 HOURS PRN
Status: DISCONTINUED | OUTPATIENT
Start: 2018-06-28 | End: 2018-07-02

## 2018-06-28 RX ORDER — ONDANSETRON 2 MG/ML
INJECTION INTRAMUSCULAR; INTRAVENOUS AS NEEDED
Status: DISCONTINUED | OUTPATIENT
Start: 2018-06-28 | End: 2018-06-28 | Stop reason: SURG

## 2018-06-28 RX ORDER — NALOXONE HYDROCHLORIDE 0.4 MG/ML
80 INJECTION, SOLUTION INTRAMUSCULAR; INTRAVENOUS; SUBCUTANEOUS AS NEEDED
Status: DISCONTINUED | OUTPATIENT
Start: 2018-06-28 | End: 2018-06-28 | Stop reason: HOSPADM

## 2018-06-28 RX ORDER — METOCLOPRAMIDE HYDROCHLORIDE 5 MG/ML
10 INJECTION INTRAMUSCULAR; INTRAVENOUS EVERY 6 HOURS PRN
Status: DISCONTINUED | OUTPATIENT
Start: 2018-06-28 | End: 2018-07-02

## 2018-06-28 RX ORDER — HYDROMORPHONE HYDROCHLORIDE 1 MG/ML
1 INJECTION, SOLUTION INTRAMUSCULAR; INTRAVENOUS; SUBCUTANEOUS EVERY 2 HOUR PRN
Status: DISCONTINUED | OUTPATIENT
Start: 2018-06-28 | End: 2018-07-02

## 2018-06-28 RX ORDER — DEXTROSE, SODIUM CHLORIDE, AND POTASSIUM CHLORIDE 5; .45; .15 G/100ML; G/100ML; G/100ML
INJECTION INTRAVENOUS CONTINUOUS
Status: DISCONTINUED | OUTPATIENT
Start: 2018-06-28 | End: 2018-07-02

## 2018-06-28 RX ORDER — HYDROMORPHONE HYDROCHLORIDE 1 MG/ML
0.5 INJECTION, SOLUTION INTRAMUSCULAR; INTRAVENOUS; SUBCUTANEOUS EVERY 2 HOUR PRN
Status: DISCONTINUED | OUTPATIENT
Start: 2018-06-28 | End: 2018-07-02

## 2018-06-28 RX ORDER — FAMOTIDINE 20 MG/1
20 TABLET ORAL ONCE
Status: DISCONTINUED | OUTPATIENT
Start: 2018-06-28 | End: 2018-06-28 | Stop reason: HOSPADM

## 2018-06-28 RX ORDER — HYDROCODONE BITARTRATE AND ACETAMINOPHEN 7.5; 325 MG/1; MG/1
1 TABLET ORAL EVERY 4 HOURS PRN
Status: DISCONTINUED | OUTPATIENT
Start: 2018-06-28 | End: 2018-07-02

## 2018-06-28 RX ORDER — ACETAMINOPHEN 500 MG
1000 TABLET ORAL ONCE
Status: COMPLETED | OUTPATIENT
Start: 2018-06-28 | End: 2018-06-28

## 2018-06-28 RX ORDER — HEPARIN SODIUM 5000 [USP'U]/ML
5000 INJECTION, SOLUTION INTRAVENOUS; SUBCUTANEOUS EVERY 12 HOURS SCHEDULED
Status: DISCONTINUED | OUTPATIENT
Start: 2018-06-29 | End: 2018-07-02

## 2018-06-28 RX ORDER — HYDROMORPHONE HYDROCHLORIDE 1 MG/ML
0.6 INJECTION, SOLUTION INTRAMUSCULAR; INTRAVENOUS; SUBCUTANEOUS EVERY 5 MIN PRN
Status: DISCONTINUED | OUTPATIENT
Start: 2018-06-28 | End: 2018-06-28 | Stop reason: HOSPADM

## 2018-06-28 RX ORDER — HYDROCODONE BITARTRATE AND ACETAMINOPHEN 5; 325 MG/1; MG/1
1 TABLET ORAL AS NEEDED
Status: DISCONTINUED | OUTPATIENT
Start: 2018-06-28 | End: 2018-06-28 | Stop reason: HOSPADM

## 2018-06-28 RX ORDER — HYDROCODONE BITARTRATE AND ACETAMINOPHEN 7.5; 325 MG/1; MG/1
2 TABLET ORAL EVERY 4 HOURS PRN
Status: DISCONTINUED | OUTPATIENT
Start: 2018-06-28 | End: 2018-07-02

## 2018-06-28 RX ORDER — HYDROMORPHONE HYDROCHLORIDE 1 MG/ML
0.2 INJECTION, SOLUTION INTRAMUSCULAR; INTRAVENOUS; SUBCUTANEOUS EVERY 2 HOUR PRN
Status: DISCONTINUED | OUTPATIENT
Start: 2018-06-28 | End: 2018-06-28

## 2018-06-28 RX ORDER — ONDANSETRON 2 MG/ML
4 INJECTION INTRAMUSCULAR; INTRAVENOUS ONCE AS NEEDED
Status: DISCONTINUED | OUTPATIENT
Start: 2018-06-28 | End: 2018-06-28 | Stop reason: HOSPADM

## 2018-06-28 RX ORDER — SODIUM CHLORIDE 0.9 % (FLUSH) 0.9 %
10 SYRINGE (ML) INJECTION AS NEEDED
Status: DISCONTINUED | OUTPATIENT
Start: 2018-06-28 | End: 2018-07-02

## 2018-06-28 RX ADMIN — MIDAZOLAM HYDROCHLORIDE 2 MG: 1 INJECTION INTRAMUSCULAR; INTRAVENOUS at 19:38:00

## 2018-06-28 RX ADMIN — DEXAMETHASONE SODIUM PHOSPHATE 4 MG: 4 MG/ML VIAL (ML) INJECTION at 20:14:00

## 2018-06-28 RX ADMIN — LIDOCAINE HYDROCHLORIDE 100 MG: 10 INJECTION, SOLUTION EPIDURAL; INFILTRATION; INTRACAUDAL; PERINEURAL at 19:41:00

## 2018-06-28 RX ADMIN — ONDANSETRON 4 MG: 2 INJECTION INTRAMUSCULAR; INTRAVENOUS at 20:14:00

## 2018-06-29 PROBLEM — L02.211 ABDOMINAL WALL ABSCESS: Status: ACTIVE | Noted: 2018-06-27

## 2018-06-29 LAB
ALBUMIN SERPL BCP-MCNC: 2.6 G/DL (ref 3.5–4.8)
ALP SERPL-CCNC: 58 U/L (ref 32–100)
ALT SERPL-CCNC: 14 U/L (ref 14–54)
ANION GAP SERPL CALC-SCNC: 7 MMOL/L (ref 0–18)
AST SERPL-CCNC: 22 U/L (ref 15–41)
BASOPHILS # BLD: 0 K/UL (ref 0–0.2)
BASOPHILS NFR BLD: 0 %
BILIRUB DIRECT SERPL-MCNC: 0.1 MG/DL (ref 0–0.2)
BILIRUB SERPL-MCNC: 0.4 MG/DL (ref 0.3–1.2)
BUN SERPL-MCNC: 9 MG/DL (ref 8–20)
BUN/CREAT SERPL: 13.6 (ref 10–20)
CALCIUM SERPL-MCNC: 8.9 MG/DL (ref 8.5–10.5)
CHLORIDE SERPL-SCNC: 99 MMOL/L (ref 95–110)
CO2 SERPL-SCNC: 28 MMOL/L (ref 22–32)
CREAT SERPL-MCNC: 0.66 MG/DL (ref 0.5–1.5)
EOSINOPHIL # BLD: 0 K/UL (ref 0–0.7)
EOSINOPHIL NFR BLD: 0 %
ERYTHROCYTE [DISTWIDTH] IN BLOOD BY AUTOMATED COUNT: 14.5 % (ref 11–15)
GLUCOSE SERPL-MCNC: 144 MG/DL (ref 70–99)
HCT VFR BLD AUTO: 30.4 % (ref 35–48)
HGB BLD-MCNC: 9.8 G/DL (ref 12–16)
LYMPHOCYTES # BLD: 0.8 K/UL (ref 1–4)
LYMPHOCYTES NFR BLD: 13 %
MAGNESIUM SERPL-MCNC: 1.7 MG/DL (ref 1.8–2.5)
MCH RBC QN AUTO: 25.3 PG (ref 27–32)
MCHC RBC AUTO-ENTMCNC: 32.3 G/DL (ref 32–37)
MCV RBC AUTO: 78.3 FL (ref 80–100)
MONOCYTES # BLD: 0.2 K/UL (ref 0–1)
MONOCYTES NFR BLD: 4 %
NEUTROPHILS # BLD AUTO: 5.6 K/UL (ref 1.8–7.7)
NEUTROPHILS NFR BLD: 83 %
OSMOLALITY UR CALC.SUM OF ELEC: 279 MOSM/KG (ref 275–295)
PHOSPHATE SERPL-MCNC: 4.2 MG/DL (ref 2.4–4.7)
PLATELET # BLD AUTO: 345 K/UL (ref 140–400)
PMV BLD AUTO: 6.7 FL (ref 7.4–10.3)
POTASSIUM SERPL-SCNC: 4 MMOL/L (ref 3.3–5.1)
PROT SERPL-MCNC: 6.8 G/DL (ref 5.9–8.4)
RBC # BLD AUTO: 3.88 M/UL (ref 3.7–5.4)
SODIUM SERPL-SCNC: 134 MMOL/L (ref 136–144)
WBC # BLD AUTO: 6.7 K/UL (ref 4–11)

## 2018-06-29 PROCEDURE — 99233 SBSQ HOSP IP/OBS HIGH 50: CPT | Performed by: HOSPITALIST

## 2018-06-29 RX ORDER — MAGNESIUM OXIDE 400 MG (241.3 MG MAGNESIUM) TABLET
400 TABLET ONCE
Status: COMPLETED | OUTPATIENT
Start: 2018-06-29 | End: 2018-06-29

## 2018-06-29 NOTE — BRIEF OP NOTE
Pre-Operative Diagnosis: Abdominal wall abscess [L02.211]     Post-Operative Diagnosis: Abdominal wall abscess, NECROTIZING FASCITIS      Procedure Performed:   Procedure(s):  RADICAL DEBRIDEMENT OF NECROTIZING FASCITIS ABDOMINAL WALL  AND REMOVAL OF Karlynn Ku

## 2018-06-29 NOTE — OPERATIVE REPORT
[facility]    PATIENT'S NAME: Gaby Olya   ATTENDING PHYSICIAN: Sera Fernandez MD   OPERATING PHYSICIAN: Oliver Bernstein MD   PATIENT ACCOUNT#:   [id number]    LOCATION:  [address] [place] RECORD #:   [id number]       DATE [facility] scalpel. All necrotic tissue was sharply debrided. Cultures were obtained. There was a necrotizing fasciitis present with necrotic fat necrosis present. Using finger dissection, all areas of loculations were debrided, opened, and unroofed.   All 3 areas

## 2018-06-29 NOTE — ANESTHESIA PREPROCEDURE EVALUATION
Anesthesia PreOp Note    HPI:     Willy Pineda is a 41year old female who presents for preoperative consultation requested by: Sallye Najjar, MD    Date of Surgery: 6/28/2018    Procedure(s):  INCISION AND DRAINAGE  Indication: Abdominal wall ab tablet Rfl: 0 Past Week at Unknown time   Fluticasone Propionate 50 MCG/ACT Nasal Suspension 1 spray by Nasal route.  Disp:  Rfl:  Past Month at Unknown time       Current Facility-Administered Medications Ordered in Epic:  lactated ringers infusion  Lowell Licona is 76.7 kg (169 lb). Her oral temperature is 98 °F (36.7 °C). Her blood pressure is 108/64 and her pulse is 86. Her respiration is 16 and oxygen saturation is 100%.     06/27/18  1721 06/28/18  1440   BP:  108/64   BP Location:  Right arm   Pulse:  86   Res

## 2018-06-29 NOTE — H&P
Ama 66 Patient Status:  Inpatient    1975 MRN [id number]   Location [facility] 4W/SW/SE Attending Jesus Bennett MD   Hosp Day # 0 PCP Candice Mcneil DO     Date:  2018  Claudio Mott units Oral Tab Past Week at Unknown time  No Yes   Sig: Take 1 tablet by mouth once a week. Sulfamethoxazole-TMP DS (BACTRIM DS) 800-160 MG Oral Tab per tablet 6/27/2018 at Unknown time  No Yes   Sig: Take 1 tablet by mouth 2 (two) times daily.       Faci affect. Laboratory Data:        Imaging:  No exam resulted this encounter.     Assessment and Plan:    Abdominal wall necrotizing fasciitis with foreign body in place  Status post radical debridement, prior left over sutures removed, patient started on

## 2018-06-29 NOTE — ANESTHESIA POSTPROCEDURE EVALUATION
Patient: Dale Rodrigez    Procedure Summary     Date:  06/28/18 Room / Location:  [facility] OR 04 / [facility] OR    Anesthesia Start:  1934 Anesthesia Stop:  2047    Procedure:  INCISION AND DRAINAGE (N/A ) Diagnosis:       Abdominal wall abscess      (A

## 2018-06-29 NOTE — H&P (VIEW-ONLY)
Silke Solares is a 41year old female. No chief complaint on file.     HPI:    Patient is here for evaluation of hemorrhoids . The patient complains of pain  . The  patient is pain and burning sensation. The patient denies painful bowel movements. Vitamins-Minerals (CENTRUM) Oral Tab Take 1 tablet by mouth daily.  Disp:  Rfl:           HISTORY:       Past Medical History:   Diagnosis Date   • Fibroids         Past Surgical History:  03/28/2018: ABDOMINOPLASTY  No date: APPENDECTOMY  12/22/2011: Inna Sorensen umbilicus. The low abdominal incision has 3 areas of opening one near the midline approximately 3 cm in size, mid right abdomen draining pus proximate 6 cm in size and right flank wound which is draining pus approximately 12 cm in size.   There is cellulit room due to pinkened concerns. Patient just drank a bottle of water and was able to have surgery today. I will place the patient on by mouth Bactrim at this time to cover possible MRSA infection.   Risk of procedure including bleeding, infection, postoper

## 2018-06-29 NOTE — ANESTHESIA PROCEDURE NOTES
Airway  Urgency: elective    Airway not difficult    General Information and Staff    Patient location during procedure: OR  Anesthesiologist: NAJMA QUINTANILLA  Performed: anesthesiologist     Indications and Patient Condition  Indications for airway management

## 2018-06-29 NOTE — INTERVAL H&P NOTE
Pre-op Diagnosis: Abdominal wall abscess [L02.211]    The above referenced H&P was reviewed by Yvonne Vyas MD on 6/28/2018, the patient was examined and no significant changes have occurred in the patient's condition since the H&P was performed.   I

## 2018-06-29 NOTE — PROGRESS NOTES
[facility]    Progress Note    Silke Confer Patient Status:  Inpatient    1975 MRN [id number]   Location [facility] 4W/SW/SE Attending Gali Washburn MD   [facility] Day # 1 PCP Randall Mccoy DO       Subjective:   Nancy Pretty wall Necrotizing fasciitis ([facility] [address]  Status post radical debridement by Dr. Marilou Sapp placed   Wound care recs appreciated     Anemia of Chronic disease   Secondary to heavy menstrual bleeding   Discharge on iron    Quality:  · DVT Prophylaxis: hep

## 2018-06-29 NOTE — PROGRESS NOTES
[place] [facility]    Progress Note    Ryan Roberson Patient Status:  Inpatient    1975 MRN [id number]   Location [facility] 4W/SW/SE Attending Laura Stokes MD   [facility] Day # 1 PCP Sabina Naranjo DO       Subjective:   Marly Nelson 14.5   WBC  6.5  6.7   PLT  372  345     No results found for: PT, INR    Recent Labs   Lab  06/27/18   1715  06/29/18   0541   GLU  95  144*   BUN  6*  9   CREATSERUM  0.66  0.66   GFRAA  >60  >60   GFRNAA  >60  >60   CA  9.4  8.9   ALB  2.9*  2.6*   NA

## 2018-06-29 NOTE — WOUND PROGRESS NOTE
WOUND CARE NOTE      PLAN   Recommendations:  Dr. Santo Tapia is following the pt.     Dietary consult for recommendations for nutrition to optimize wound healing  VAC standing orders  VAC troubleshooting instructions on the machine  Staff to monitor VAC se the dressing change well. Spoke with the pt's nurse, home vac paperwork to be completed and faxed to [facility] this am.      Allergies: Patient has no known allergies.     Labs:     Lab Results  Component Value Date   WBC 6.7 06/29/2018   HGB 9.8 (L) 06/29/2018

## 2018-06-29 NOTE — CM/SW NOTE
SLIME received MD order for [facility] and I wound vac. SLIME informed [id number] wound RN of [employer] order. Wound RN aware and has sent referral for the vac. SLIME met with pt and her  at bedside to discuss MD order for [facility] and discharge planning.  Pt lives in house

## 2018-06-29 NOTE — CONSULTS
Vicki Chaudhary is a 41year old female. No chief complaint on file.     HPI:    Patient is here for evaluation of hemorrhoids . The patient complains of pain  . The  patient is pain and burning sensation. The patient denies painful bowel movements. Vitamins-Minerals (CENTRUM) Oral Tab Take 1 tablet by mouth daily.  Disp:  Rfl:           HISTORY:       Past Medical History:   Diagnosis Date   • Fibroids         Past Surgical History:  03/28/2018: ABDOMINOPLASTY  No date: APPENDECTOMY  12/22/2011: Gill Chan umbilicus. The low abdominal incision has 3 areas of opening one near the midline approximately 3 cm in size, mid right abdomen draining pus proximate 6 cm in size and right flank wound which is draining pus approximately 12 cm in size.   There is cellulit room due to pinkened concerns. Patient just drank a bottle of water and was able to have surgery today. I will place the patient on by mouth Bactrim at this time to cover possible MRSA infection.   Risk of procedure including bleeding, infection, postoper

## 2018-06-30 LAB
ANION GAP SERPL CALC-SCNC: 5 MMOL/L (ref 0–18)
BASOPHILS # BLD: 0 K/UL (ref 0–0.2)
BASOPHILS NFR BLD: 1 %
BUN SERPL-MCNC: 10 MG/DL (ref 8–20)
BUN/CREAT SERPL: 12.2 (ref 10–20)
CALCIUM SERPL-MCNC: 8.6 MG/DL (ref 8.5–10.5)
CHLORIDE SERPL-SCNC: 103 MMOL/L (ref 95–110)
CO2 SERPL-SCNC: 29 MMOL/L (ref 22–32)
CREAT SERPL-MCNC: 0.82 MG/DL (ref 0.5–1.5)
EOSINOPHIL # BLD: 0.1 K/UL (ref 0–0.7)
EOSINOPHIL NFR BLD: 2 %
ERYTHROCYTE [DISTWIDTH] IN BLOOD BY AUTOMATED COUNT: 14.6 % (ref 11–15)
GLUCOSE SERPL-MCNC: 124 MG/DL (ref 70–99)
HCT VFR BLD AUTO: 26.6 % (ref 35–48)
HGB BLD-MCNC: 8.6 G/DL (ref 12–16)
LYMPHOCYTES # BLD: 1.7 K/UL (ref 1–4)
LYMPHOCYTES NFR BLD: 35 %
MAGNESIUM SERPL-MCNC: 1.7 MG/DL (ref 1.8–2.5)
MCH RBC QN AUTO: 25.7 PG (ref 27–32)
MCHC RBC AUTO-ENTMCNC: 32.5 G/DL (ref 32–37)
MCV RBC AUTO: 79.1 FL (ref 80–100)
MONOCYTES # BLD: 0.5 K/UL (ref 0–1)
MONOCYTES NFR BLD: 9 %
NEUTROPHILS # BLD AUTO: 2.6 K/UL (ref 1.8–7.7)
NEUTROPHILS NFR BLD: 53 %
OSMOLALITY UR CALC.SUM OF ELEC: 284 MOSM/KG (ref 275–295)
PHOSPHATE SERPL-MCNC: 3.6 MG/DL (ref 2.4–4.7)
PLATELET # BLD AUTO: 338 K/UL (ref 140–400)
PMV BLD AUTO: 7.1 FL (ref 7.4–10.3)
POTASSIUM SERPL-SCNC: 3.7 MMOL/L (ref 3.3–5.1)
RBC # BLD AUTO: 3.37 M/UL (ref 3.7–5.4)
SODIUM SERPL-SCNC: 137 MMOL/L (ref 136–144)
VANCOMYCIN TROUGH SERPL-MCNC: 12.9 MCG/ML (ref 10–20)
WBC # BLD AUTO: 5 K/UL (ref 4–11)

## 2018-06-30 PROCEDURE — 99233 SBSQ HOSP IP/OBS HIGH 50: CPT | Performed by: HOSPITALIST

## 2018-06-30 RX ORDER — SODIUM CHLORIDE 9 MG/ML
INJECTION, SOLUTION INTRAVENOUS
Status: DISPENSED
Start: 2018-06-30 | End: 2018-07-01

## 2018-06-30 RX ORDER — MAGNESIUM OXIDE 400 MG (241.3 MG MAGNESIUM) TABLET
400 TABLET ONCE
Status: COMPLETED | OUTPATIENT
Start: 2018-06-30 | End: 2018-06-30

## 2018-06-30 RX ORDER — FLUCONAZOLE 100 MG/1
150 TABLET ORAL ONCE
Status: COMPLETED | OUTPATIENT
Start: 2018-06-30 | End: 2018-06-30

## 2018-06-30 RX ORDER — HYDROCODONE BITARTRATE AND ACETAMINOPHEN 7.5; 325 MG/1; MG/1
1-2 TABLET ORAL EVERY 6 HOURS PRN
Qty: 30 TABLET | Refills: 0 | Status: SHIPPED | OUTPATIENT
Start: 2018-06-30 | End: 2018-10-18 | Stop reason: ALTCHOICE

## 2018-06-30 RX ORDER — POTASSIUM CHLORIDE 20 MEQ/1
40 TABLET, EXTENDED RELEASE ORAL ONCE
Status: COMPLETED | OUTPATIENT
Start: 2018-06-30 | End: 2018-06-30

## 2018-06-30 NOTE — PROGRESS NOTES
[place] [facility] - [facility]    Progress Note    Pushpa Mike Patient Status:  Inpatient    1975 MRN [id number]   Location [facility] 4W/SW/SE Attending Omar Rogers MD   [address] Day # 2 PCP Candice Mcneil DO       Subjective:   Kathryn Rivera Continue IV vancomycin for MRSA- await final cx results   Wound care recs appreciated     Anemia of Chronic disease   Secondary to heavy menstrual bleeding   Discharge on iron    Quality:  · DVT Prophylaxis: heparin   · CODE status: Full code     Greater

## 2018-06-30 NOTE — PROGRESS NOTES
[place] [facility]    Progress Note    Héctor Richard Patient Status:  Inpatient    1975 MRN [id number]   Location [facility] 4W/SW/SE Attending Susie Reese MD   [facility] Day # 2 PCP Raghav Judd DO       Subjective:   Jerrod Estrada 14.6  14.5  14.6   WBC  6.5  6.7  5.0   PLT  372  345  338     No results found for: PT, INR    Recent Labs   Lab  06/27/18   1715  06/29/18   0541  06/30/18   0537   GLU  95  144*  124*   BUN  6*  9  10   CREATSERUM  0.66  0.66  0.82   GFRAA  >60  >60  >6

## 2018-07-01 LAB
ANION GAP SERPL CALC-SCNC: 3 MMOL/L (ref 0–18)
BASOPHILS # BLD: 0 K/UL (ref 0–0.2)
BASOPHILS NFR BLD: 1 %
BUN SERPL-MCNC: 7 MG/DL (ref 8–20)
BUN/CREAT SERPL: 9.9 (ref 10–20)
CALCIUM SERPL-MCNC: 8.5 MG/DL (ref 8.5–10.5)
CHLORIDE SERPL-SCNC: 105 MMOL/L (ref 95–110)
CO2 SERPL-SCNC: 29 MMOL/L (ref 22–32)
CREAT SERPL-MCNC: 0.71 MG/DL (ref 0.5–1.5)
EOSINOPHIL # BLD: 0.2 K/UL (ref 0–0.7)
EOSINOPHIL NFR BLD: 3 %
ERYTHROCYTE [DISTWIDTH] IN BLOOD BY AUTOMATED COUNT: 14.7 % (ref 11–15)
GLUCOSE SERPL-MCNC: 102 MG/DL (ref 70–99)
HCT VFR BLD AUTO: 27.4 % (ref 35–48)
HGB BLD-MCNC: 8.7 G/DL (ref 12–16)
LYMPHOCYTES # BLD: 1.8 K/UL (ref 1–4)
LYMPHOCYTES NFR BLD: 35 %
MAGNESIUM SERPL-MCNC: 1.7 MG/DL (ref 1.8–2.5)
MCH RBC QN AUTO: 25.1 PG (ref 27–32)
MCHC RBC AUTO-ENTMCNC: 31.7 G/DL (ref 32–37)
MCV RBC AUTO: 79.2 FL (ref 80–100)
MONOCYTES # BLD: 0.5 K/UL (ref 0–1)
MONOCYTES NFR BLD: 11 %
NEUTROPHILS # BLD AUTO: 2.6 K/UL (ref 1.8–7.7)
NEUTROPHILS NFR BLD: 51 %
OSMOLALITY UR CALC.SUM OF ELEC: 282 MOSM/KG (ref 275–295)
PHOSPHATE SERPL-MCNC: 3.7 MG/DL (ref 2.4–4.7)
PLATELET # BLD AUTO: 328 K/UL (ref 140–400)
PMV BLD AUTO: 7 FL (ref 7.4–10.3)
POTASSIUM SERPL-SCNC: 4.4 MMOL/L (ref 3.3–5.1)
POTASSIUM SERPL-SCNC: 4.4 MMOL/L (ref 3.3–5.1)
RBC # BLD AUTO: 3.46 M/UL (ref 3.7–5.4)
SODIUM SERPL-SCNC: 137 MMOL/L (ref 136–144)
WBC # BLD AUTO: 5.1 K/UL (ref 4–11)

## 2018-07-01 PROCEDURE — 99233 SBSQ HOSP IP/OBS HIGH 50: CPT | Performed by: HOSPITALIST

## 2018-07-01 RX ORDER — MAGNESIUM OXIDE 400 MG (241.3 MG MAGNESIUM) TABLET
400 TABLET ONCE
Status: COMPLETED | OUTPATIENT
Start: 2018-07-01 | End: 2018-07-01

## 2018-07-01 NOTE — PROGRESS NOTES
[facility]    Progress Note    Everardo Pozo Patient Status:  Inpatient    1975 MRN [id number]   Location [facility] 4W/SW/SE Attending Gene Rae MD   [facility] Day # 3 PCP Ofelia Stout DO       Subjective:   Dave Bangura Necrotizing fasciitis ([facility] [address]  Status post radical debridement by Dr. Marilou Sapp placed   Continue IV vancomycin for MRSA- await final cx results   Wound care recs appreciated   Will be discharged on bactrim hopefully tomorrow once wound vac is capri

## 2018-07-01 NOTE — PROGRESS NOTES
[place] [facility] - [facility]    Progress Note    Leandrew Jame Patient Status:  Inpatient    1975 MRN [id number]   Location [facility] 4W/SW/SE Attending Rome Banuelos MD   [address] Day # 3 PCP Juliano Brooke DO       Subjective:   [address]  144*  124*  102*   BUN  6*  9  10  7*   CREATSERUM  0.66  0.66  0.82  0.71   GFRAA  >60  >60  >60  >60   GFRNAA  >60  >60  >60  >60   CA  9.4  8.9  8.6  8.5   ALB  2.9*  2.6*   --    --    NA  136  134*  137  137   K  4.1  4.0  3.7  4.4  4.4   CL  102

## 2018-07-02 VITALS
RESPIRATION RATE: 16 BRPM | SYSTOLIC BLOOD PRESSURE: 96 MMHG | HEIGHT: 66 IN | HEART RATE: 72 BPM | BODY MASS INDEX: 31.07 KG/M2 | WEIGHT: 193.31 LBS | OXYGEN SATURATION: 97 % | DIASTOLIC BLOOD PRESSURE: 56 MMHG | TEMPERATURE: 98 F

## 2018-07-02 LAB
ANION GAP SERPL CALC-SCNC: 5 MMOL/L (ref 0–18)
BASOPHILS # BLD: 0 K/UL (ref 0–0.2)
BASOPHILS NFR BLD: 1 %
BUN SERPL-MCNC: 5 MG/DL (ref 8–20)
BUN/CREAT SERPL: 7.6 (ref 10–20)
CALCIUM SERPL-MCNC: 8.9 MG/DL (ref 8.5–10.5)
CHLORIDE SERPL-SCNC: 102 MMOL/L (ref 95–110)
CO2 SERPL-SCNC: 30 MMOL/L (ref 22–32)
CREAT SERPL-MCNC: 0.66 MG/DL (ref 0.5–1.5)
EOSINOPHIL # BLD: 0.2 K/UL (ref 0–0.7)
EOSINOPHIL NFR BLD: 4 %
ERYTHROCYTE [DISTWIDTH] IN BLOOD BY AUTOMATED COUNT: 14.9 % (ref 11–15)
GLUCOSE SERPL-MCNC: 95 MG/DL (ref 70–99)
HCT VFR BLD AUTO: 27.4 % (ref 35–48)
HGB BLD-MCNC: 8.7 G/DL (ref 12–16)
LYMPHOCYTES # BLD: 1.9 K/UL (ref 1–4)
LYMPHOCYTES NFR BLD: 39 %
MAGNESIUM SERPL-MCNC: 1.7 MG/DL (ref 1.8–2.5)
MCH RBC QN AUTO: 25.1 PG (ref 27–32)
MCHC RBC AUTO-ENTMCNC: 31.9 G/DL (ref 32–37)
MCV RBC AUTO: 78.5 FL (ref 80–100)
MONOCYTES # BLD: 0.5 K/UL (ref 0–1)
MONOCYTES NFR BLD: 10 %
NEUTROPHILS # BLD AUTO: 2.3 K/UL (ref 1.8–7.7)
NEUTROPHILS NFR BLD: 46 %
OSMOLALITY UR CALC.SUM OF ELEC: 281 MOSM/KG (ref 275–295)
PLATELET # BLD AUTO: 322 K/UL (ref 140–400)
PMV BLD AUTO: 6.5 FL (ref 7.4–10.3)
POTASSIUM SERPL-SCNC: 4 MMOL/L (ref 3.3–5.1)
RBC # BLD AUTO: 3.49 M/UL (ref 3.7–5.4)
SODIUM SERPL-SCNC: 137 MMOL/L (ref 136–144)
WBC # BLD AUTO: 4.9 K/UL (ref 4–11)

## 2018-07-02 PROCEDURE — 99239 HOSP IP/OBS DSCHRG MGMT >30: CPT | Performed by: HOSPITALIST

## 2018-07-02 RX ORDER — MAGNESIUM OXIDE 400 MG (241.3 MG MAGNESIUM) TABLET
400 TABLET ONCE
Status: COMPLETED | OUTPATIENT
Start: 2018-07-02 | End: 2018-07-02

## 2018-07-02 NOTE — WOUND PROGRESS NOTE
Spoke to Christine Cerna at [facility] ([phone])inquiring home vac delivery.  Christine Cerna stated that pt has [facility] and needs a referral from MD. Christine Cerna phoned Dr. Lynn Miller office and they have a page out to the PA for a referral. I expressed that this

## 2018-07-02 NOTE — PROGRESS NOTES
[facility]    Progress Note    Jef Winslow Patient Status:  Inpatient    1975 MRN [id number]   Location [facility] 4W/SW/SE Attending Samantha Marshall MD   [facility] Day # 4 PCP Kameron Brownlee DO       Subjective:   Garrett Robert 0447   GLU  95  144*  124*  102*  95   BUN  6*  9  10  7*  5*   CREATSERUM  0.66  0.66  0.82  0.71  0.66   GFRAA  >60  >60  >60  >60  >60   GFRNAA  >60  >60  >60  >60  >60   CA  9.4  8.9  8.6  8.5  8.9   ALB  2.9*  2.6*   --    --    --    NA  136  134*  1

## 2018-07-02 NOTE — PROGRESS NOTES
Spoke to Basim Brighticikerrie from [facility]. Report and updates provided. All questions answered.

## 2018-07-02 NOTE — WOUND PROGRESS NOTE
Spoke with Dusty Palafox at [facility] and she is on her way to deliver pt home vac. Bedside RN Maria M and Daryl Montes advised.

## 2018-07-02 NOTE — DISCHARGE SUMMARY
[place] [facility] - [facility]    Discharge Summary    Renae Olson Patient Status:  Inpatient    1975 MRN [id number]   Location [facility] 4W/SW/SE Attending Eugenie Amador MD   [address] Day # 4 PCP Neida Cole DO     Date of Admission:  Preliminary result          Discharge Plan:   Discharge Condition: Stable    Current Discharge Medication List    New Orders    hydrocodone-acetaminophen 7.5-325 MG Oral Tab  Take 1-2 tablets by mouth every 6 (six) hours as needed for Pain.       Home Meds HOME INSTRUCTIONS  AMBSURG HOME CARE INSTRUCTIONS: POST-OP ANESTHESIA  The medication that you received for sedation or general anesthesia can last up to 24 hours. Your judgment and reflexes may be altered, even if you feel like your normal self.       Herminia Jay andreea Almazan  7/2/2018

## 2018-07-02 NOTE — WOUND PROGRESS NOTE
Pt seen for wound vac change. Pt was pre-medicated with po pain meds approximately 45 minutes prior. Pt was seen laying in bed, no complaints regarding vac. There is a moderate amount of sanguinous drainage present in the canister.  Saline was inserted into

## 2018-07-02 NOTE — PLAN OF CARE
Pt alert and oriented. Tolerating diet well. States norco controls pain. Wound vac maintained to abdomen; dressing change this AM by Wound RN's. Pt up with stand by assist. Plan for d/c this afternoon; awaiting insurance approval and delivery of wound vac.

## 2018-07-02 NOTE — CM/SW NOTE
7/2CHector from [facility] informed this Writer that she has spoken with Cooper Kamara the [facility] rep and she is on the way here with a woundvac.  This Writer informed Veena at [facility] of the above, she will follow up with the Patient in regards to start of service

## 2018-07-10 ENCOUNTER — OFFICE VISIT (OUTPATIENT)
Dept: FAMILY MEDICINE CLINIC | Facility: CLINIC | Age: 43
End: 2018-07-10

## 2018-07-10 VITALS
BODY MASS INDEX: 27.48 KG/M2 | HEIGHT: 66 IN | OXYGEN SATURATION: 98 % | WEIGHT: 171 LBS | HEART RATE: 84 BPM | SYSTOLIC BLOOD PRESSURE: 118 MMHG | DIASTOLIC BLOOD PRESSURE: 72 MMHG

## 2018-07-10 DIAGNOSIS — M72.6 NECROTIZING FASCIITIS (HCC): Primary | ICD-10-CM

## 2018-07-10 DIAGNOSIS — L02.211 ABDOMINAL WALL ABSCESS: ICD-10-CM

## 2018-07-10 PROCEDURE — 1111F DSCHRG MED/CURRENT MED MERGE: CPT | Performed by: FAMILY MEDICINE

## 2018-07-10 PROCEDURE — 99495 TRANSJ CARE MGMT MOD F2F 14D: CPT | Performed by: FAMILY MEDICINE

## 2018-07-10 NOTE — PROGRESS NOTES
HPI:    Ashley Martins is a 41year old female here today for hospital follow up.    She was discharged from Inpatient hospital[facility]  to [address] [place] [address] Date: 6/28  Discharge Date: [facility] Discharge Diagnosis:    Abdominal wall Necroti iron every day but still feeling tired. Appetite has improved and eating better. Told to take off work to fully heal for at least 3-4 months. Allergies:  She has No Known Allergies.     Current Meds:    Current Outpatient Prescriptions on File Prior calculated from the following:    Height as of this encounter: 66\". Weight as of this encounter: 171 lb. /72   Pulse 84   Ht 66\"   Wt 171 lb   SpO2 98%   Breastfeeding?  No   BMI 27.60 kg/m²    GENERAL: well developed, well nourished, in no capri the patient was made within 2 business days of discharge on date above   Medical Decision Making- Based on service period of discharge to 30 days:   · Number of Possible Diagnoses and/or Management Options: moderate  · Amount and/or Complexity of Data to B

## 2018-07-31 ENCOUNTER — PATIENT MESSAGE (OUTPATIENT)
Dept: FAMILY MEDICINE CLINIC | Facility: CLINIC | Age: 43
End: 2018-07-31

## 2018-08-02 ENCOUNTER — TELEPHONE (OUTPATIENT)
Dept: FAMILY MEDICINE CLINIC | Facility: CLINIC | Age: 43
End: 2018-08-02

## 2018-08-02 NOTE — TELEPHONE ENCOUNTER
Spoke with pt and message forwarded to Dr. Lc Barros. Will let pt know to drop off paperwork for Dr. Lc Barros.

## 2018-08-06 ENCOUNTER — TELEPHONE (OUTPATIENT)
Dept: FAMILY MEDICINE CLINIC | Facility: CLINIC | Age: 43
End: 2018-08-06

## 2018-08-27 ENCOUNTER — MED REC SCAN ONLY (OUTPATIENT)
Dept: FAMILY MEDICINE CLINIC | Facility: CLINIC | Age: 43
End: 2018-08-27

## 2018-09-01 NOTE — TELEPHONE ENCOUNTER
Completed prior auth paperwork for [employer] and faxed to number given from [employer].
Alert and oriented, no focal deficits, no motor or sensory deficits.

## 2018-09-05 ENCOUNTER — OFFICE VISIT (OUTPATIENT)
Dept: FAMILY MEDICINE CLINIC | Facility: CLINIC | Age: 43
End: 2018-09-05

## 2018-09-05 VITALS
OXYGEN SATURATION: 98 % | HEART RATE: 69 BPM | BODY MASS INDEX: 29.41 KG/M2 | WEIGHT: 183 LBS | SYSTOLIC BLOOD PRESSURE: 114 MMHG | HEIGHT: 66 IN | DIASTOLIC BLOOD PRESSURE: 64 MMHG

## 2018-09-05 DIAGNOSIS — L91.0 KELOID SCAR OF SKIN: ICD-10-CM

## 2018-09-05 DIAGNOSIS — H10.12 ALLERGIC CONJUNCTIVITIS OF LEFT EYE: ICD-10-CM

## 2018-09-05 DIAGNOSIS — R29.898 WEAKNESS OF SHOULDER: Primary | ICD-10-CM

## 2018-09-05 PROCEDURE — 99214 OFFICE O/P EST MOD 30 MIN: CPT | Performed by: FAMILY MEDICINE

## 2018-09-05 RX ORDER — CROMOLYN SODIUM 40 MG/ML
1 SOLUTION/ DROPS OPHTHALMIC 2 TIMES DAILY
Qty: 10 ML | Refills: 0 | Status: SHIPPED | OUTPATIENT
Start: 2018-09-05 | End: 2018-10-18 | Stop reason: ALTCHOICE

## 2018-09-05 NOTE — PROGRESS NOTES
CC:  Patient presents with:  Shoulder Pain: right shoulder pain has been getting worse, states she had cortisone shots in the past which helped  Scar: states her scars on her breast are becoming keloids she would like to get a referral derm      HPI: 41 ye by mouth every 6 (six) hours as needed for Pain. Disp: 30 tablet Rfl: 0       Patient has no known allergies.       Vitals:    09/05/18  1009   BP: 114/64   Pulse: 69   SpO2: 98%   Weight: 183 lb   Height: 66\"     Wt Readings from Last 6 Encounters:  09/05

## 2018-09-05 NOTE — PATIENT INSTRUCTIONS
Conjunctivitis, Allergic    Conjunctivitis is an irritation of a thin membrane in the eye. This membrane is called the conjunctiva. It covers the white of the eye and the inside of the eyelid.  The condition is often called pink eye or red eye because the © 7530-6026 [address] [facility], [facility] [place]. All rights reserved. This information is not intended as a substitute for professional medical care. Always follow your healthcare professional's instructions.

## 2018-09-21 ENCOUNTER — HOSPITAL ENCOUNTER (OUTPATIENT)
Dept: MRI IMAGING | Facility: HOSPITAL | Age: 43
Discharge: HOME | End: 2018-09-21
Attending: FAMILY MEDICINE
Payer: COMMERCIAL

## 2018-09-21 DIAGNOSIS — R29.898 WEAKNESS OF SHOULDER: ICD-10-CM

## 2018-09-21 PROCEDURE — 73221 MRI JOINT UPR EXTREM W/O DYE: CPT | Performed by: FAMILY MEDICINE

## 2018-09-24 DIAGNOSIS — S43.431A SUPERIOR GLENOID LABRUM LESION OF RIGHT SHOULDER, INITIAL ENCOUNTER: Primary | ICD-10-CM

## 2018-09-24 PROBLEM — M75.101 TEAR OF RIGHT SUPRASPINATUS TENDON: Status: ACTIVE | Noted: 2018-01-30

## 2018-09-26 ENCOUNTER — TELEPHONE (OUTPATIENT)
Dept: FAMILY MEDICINE CLINIC | Facility: CLINIC | Age: 43
End: 2018-09-26

## 2018-09-26 NOTE — TELEPHONE ENCOUNTER
Faxed [facility] paperwork for month of September as requested to number provided.

## 2018-10-02 ENCOUNTER — LAB REQUISITION (OUTPATIENT)
Dept: LAB | Facility: HOSPITAL | Age: 43
End: 2018-10-02
Payer: COMMERCIAL

## 2018-10-02 DIAGNOSIS — Z01.89 ENCOUNTER FOR OTHER SPECIFIED SPECIAL EXAMINATIONS: ICD-10-CM

## 2018-10-02 PROCEDURE — 87070 CULTURE OTHR SPECIMN AEROBIC: CPT | Performed by: SURGERY

## 2018-10-02 PROCEDURE — 87205 SMEAR GRAM STAIN: CPT | Performed by: SURGERY

## 2018-10-02 PROCEDURE — 87075 CULTR BACTERIA EXCEPT BLOOD: CPT | Performed by: SURGERY

## 2018-10-02 PROCEDURE — 88304 TISSUE EXAM BY PATHOLOGIST: CPT | Performed by: SURGERY

## 2018-10-04 ENCOUNTER — TELEPHONE (OUTPATIENT)
Dept: FAMILY MEDICINE CLINIC | Facility: CLINIC | Age: 43
End: 2018-10-04

## 2018-10-04 DIAGNOSIS — L02.211 CUTANEOUS ABSCESS OF ABDOMINAL WALL: Primary | ICD-10-CM

## 2018-10-04 NOTE — TELEPHONE ENCOUNTER
Susannah thapa from [facility] [phone] requesting referral for wound vac. .      Codes required for referral [employer] would vac     -vac 30 days 1 unit  - canisters 10 units   - Dressings 15 units

## 2018-10-05 ENCOUNTER — TELEPHONE (OUTPATIENT)
Dept: FAMILY MEDICINE CLINIC | Facility: CLINIC | Age: 43
End: 2018-10-05

## 2018-10-05 ENCOUNTER — OFFICE VISIT (OUTPATIENT)
Dept: FAMILY MEDICINE CLINIC | Facility: CLINIC | Age: 43
End: 2018-10-05

## 2018-10-05 VITALS
HEART RATE: 73 BPM | BODY MASS INDEX: 30.22 KG/M2 | OXYGEN SATURATION: 98 % | DIASTOLIC BLOOD PRESSURE: 74 MMHG | HEIGHT: 66 IN | WEIGHT: 188 LBS | SYSTOLIC BLOOD PRESSURE: 118 MMHG

## 2018-10-05 DIAGNOSIS — L02.211 ABDOMINAL WALL ABSCESS: Primary | ICD-10-CM

## 2018-10-05 DIAGNOSIS — S43.431D SUPERIOR GLENOID LABRUM LESION OF RIGHT SHOULDER, SUBSEQUENT ENCOUNTER: ICD-10-CM

## 2018-10-05 PROCEDURE — 99214 OFFICE O/P EST MOD 30 MIN: CPT | Performed by: FAMILY MEDICINE

## 2018-10-05 RX ORDER — TRAMADOL HYDROCHLORIDE 50 MG/1
TABLET ORAL
Refills: 0 | COMMUNITY
Start: 2018-10-02 | End: 2018-11-02

## 2018-10-05 RX ORDER — AMOXICILLIN AND CLAVULANATE POTASSIUM 875; 125 MG/1; MG/1
TABLET, FILM COATED ORAL
Refills: 0 | COMMUNITY
Start: 2018-10-02 | End: 2018-10-18 | Stop reason: ALTCHOICE

## 2018-10-05 NOTE — PROGRESS NOTES
CC:  Patient presents with: Follow - Up: had surgery on 10/2/18      HPI: 41year old female here for follow-up after abdominal debridement 10/2/18 and to discuss return to work.   Had an area of her previous abdominal wound that was not healing well so ha Male    Other Topics      Concerns:        Caffeine Concern: Not Asked        Exercise: Not Asked        Seat Belt: Not Asked        Special Diet: Not Asked        Stress Concern: Not Asked        Weight Concern: Not Asked    Social History Narrative Tia next week to discuss further management     2.  Superior glenoid labrum lesion of right shoulder, subsequent encounter    - Appointment with ortho schedule 10/18  - Continue Tramadol as needed for pain    A total of 25 minutes of this visit were s

## 2018-10-10 ENCOUNTER — MED REC SCAN ONLY (OUTPATIENT)
Dept: FAMILY MEDICINE CLINIC | Facility: CLINIC | Age: 43
End: 2018-10-10

## 2018-10-18 ENCOUNTER — OFFICE VISIT (OUTPATIENT)
Dept: ORTHOPEDICS CLINIC | Facility: CLINIC | Age: 43
End: 2018-10-18

## 2018-10-18 DIAGNOSIS — M75.01 ADHESIVE CAPSULITIS OF RIGHT SHOULDER: ICD-10-CM

## 2018-10-18 DIAGNOSIS — M24.111 DEGENERATIVE TEAR OF GLENOID LABRUM OF RIGHT SHOULDER: ICD-10-CM

## 2018-10-18 DIAGNOSIS — G89.29 CHRONIC RIGHT SHOULDER PAIN: Primary | ICD-10-CM

## 2018-10-18 DIAGNOSIS — M25.511 CHRONIC RIGHT SHOULDER PAIN: Primary | ICD-10-CM

## 2018-10-18 PROCEDURE — 99243 OFF/OP CNSLTJ NEW/EST LOW 30: CPT | Performed by: ORTHOPAEDIC SURGERY

## 2018-10-18 PROCEDURE — 99212 OFFICE O/P EST SF 10 MIN: CPT | Performed by: ORTHOPAEDIC SURGERY

## 2018-10-18 RX ORDER — PREDNISONE 20 MG/1
20 TABLET ORAL DAILY
Qty: 5 TABLET | Refills: 0 | Status: SHIPPED | OUTPATIENT
Start: 2018-10-18 | End: 2018-10-23

## 2018-10-18 NOTE — PATIENT INSTRUCTIONS
Frozen Shoulder (Adhesive Capsulitis)     Frozen shoulder is when pain and stiffness make it difficult to move your shoulder normally. This condition is also called adhesive capsulitis. The shoulder is a ball-and-socket joint.  The ball on the upper arm Mild cases may be treated with a home exercise program and anti-inflammatory medicines. More severe cases require physical therapy. In some cases a steroid is shot, or injected, into the shoulder area.  In hard to treat cases, forced movement of the shoulde 4.  an open doorway. Raise each arm up to the side, bent at 90-degree angles with palms forward. Rest your palms on the door frame. 5. Slowly step forward with one foot.  Feel the stretch in your shoulders and chest. Stand upright and don’t lean fo Frozen shoulder is another name for adhesive capsulitis, which causes restricted movement in the shoulder. If you have frozen shoulder, this stretch may cause discomfort, especially when you first get started.  A few months may pass before you achieve the r Frozen shoulder is another name for adhesive capsulitis. This causes restricted movement in the shoulder. If you have frozen shoulder, this stretch may cause discomfort, especially when you first get started.  A few months may pass before you achieve the re © 9083-7348 [address]. [address] [facility] [place]. All rights reserved. This information is not intended as a substitute for professional medical care. Always follow your healthcare professional's instructions.         Shoulde Frozen shoulder  Frozen shoulder is another name for adhesive capsulitis. This causes restricted movement in the shoulder. If you have frozen shoulder, this stretch may cause mild pain, especially when you first get started.  A few months may pass before yo

## 2018-10-18 NOTE — PROGRESS NOTES
10/18/2018  Fuchs Goldy  4/25/1975  41year old   female  Flower Christine MD    HPI:   Patient presents with:  Consult: C/o constant right shoulder pain for over a year. Completed a MRI of right shoulder on 9/21/18. Occupation is a . Smokeless tobacco: Never Used    Alcohol use: No      Alcohol/week: 0.0 oz    Drug use: No          REVIEW OF SYSTEMS:   A 12 point review of systems was performed as documented on the intake form and reviewed by me today with pertinent positives and negat motion and lateral strengthening    All of their questions were answered and they are in agreement with the treatment plan. The patient will return to clinic in 6 weeks as needed    No orders of the defined types were placed in this encounter.

## 2018-10-27 ENCOUNTER — OFFICE VISIT (OUTPATIENT)
Dept: DERMATOLOGY CLINIC | Facility: CLINIC | Age: 43
End: 2018-10-27

## 2018-10-27 DIAGNOSIS — L91.0 KELOID SCAR: Primary | ICD-10-CM

## 2018-10-27 PROCEDURE — 99212 OFFICE O/P EST SF 10 MIN: CPT | Performed by: DERMATOLOGY

## 2018-10-27 PROCEDURE — 99202 OFFICE O/P NEW SF 15 MIN: CPT | Performed by: DERMATOLOGY

## 2018-10-27 NOTE — PROGRESS NOTES
HPI:     Chief Complaint     Derm Problem        HPI     Derm Problem      Additional comments: New pt. Pt presents with itchy keloids on bilateral areoles, pt had recently breast reduction.            Last edited by Chung Arango, [address] on 10/27/2018  9:34 A HEMORRHOID   • REDUCTION OF LARGE BREAST Bilateral 03/28/2018     Social History    Socioeconomic History      Marital status:       Spouse name: Not on file      Number of children: 2      Years of education: Not on file      Highest education leve have also recommended a 3-month trial of silicone scar patches as I told her both the occlusion and the silicone is thought to soften and flatten scarring. I would then like her to follow-up in 3-4 months.   If no significant improvement or if any worsenin

## 2018-10-27 NOTE — PROGRESS NOTES
Past Medical History:   Diagnosis Date   • Fibroids      Past Surgical History:   Procedure Laterality Date   • ABDOMINOPLASTY  2018   • APPENDECTOMY     •   2011    BREECH   • D & C  2016    with hysteroscopy   • ENDOMETRIAL ABLA Relation Age of Onset   • Diabetes Mother    • Breast Cancer Maternal Cousin Female         45       No Known Allergies

## 2018-11-08 ENCOUNTER — PATIENT MESSAGE (OUTPATIENT)
Dept: FAMILY MEDICINE CLINIC | Facility: CLINIC | Age: 43
End: 2018-11-08

## 2018-11-09 NOTE — TELEPHONE ENCOUNTER
From: Jeanette Peck  To: Prudencio Meyer DO  Sent: 11/8/2018 1:44 PM CST  Subject: Non-Urgent Medical Question    HiDr. Maricel I will be needing a note for excusing me from work. I have a appointment with Dr. Geetha Milian on the 16th of this month.  I pretty

## 2018-11-20 ENCOUNTER — OFFICE VISIT (OUTPATIENT)
Dept: PHYSICAL THERAPY | Facility: HOSPITAL | Age: 43
End: 2018-11-20
Attending: ORTHOPAEDIC SURGERY
Payer: COMMERCIAL

## 2018-11-20 DIAGNOSIS — G89.29 CHRONIC RIGHT SHOULDER PAIN: ICD-10-CM

## 2018-11-20 DIAGNOSIS — M25.511 CHRONIC RIGHT SHOULDER PAIN: ICD-10-CM

## 2018-11-20 DIAGNOSIS — M75.01 ADHESIVE CAPSULITIS OF RIGHT SHOULDER: ICD-10-CM

## 2018-11-20 DIAGNOSIS — M24.111 DEGENERATIVE TEAR OF GLENOID LABRUM OF RIGHT SHOULDER: ICD-10-CM

## 2018-11-20 PROCEDURE — 97110 THERAPEUTIC EXERCISES: CPT | Performed by: PHYSICAL THERAPIST

## 2018-11-20 PROCEDURE — 97161 PT EVAL LOW COMPLEX 20 MIN: CPT | Performed by: PHYSICAL THERAPIST

## 2018-11-20 NOTE — PROGRESS NOTES
SHOULDER EVALUATION:    Referring Physician: Kenia Begum    DX Code: Chronic right shoulder pain (M25.511,G89.29)  Degenerative tear of glenoid labrum of right shoulder (M24.111)  Adhesive capsulitis of right shoulder (M75.01)     PT DX: Chronic right supine shoulder flexAAROM, man inferior GH glide gr 3  HEP:Handouts given  Pt. Education:Postural education  (Please close note - press F9 - and then reopen - click on 'Edit' - before going further).    ASSESSMENT & PLAN OF CARE:     Renae Olson is a address joint and/or soft tissue mobility  · Therapeutic exercises  · Strengthening program  · HEP instruction                                                             Pt. was advised regarding the findings of this evaluation and agrees to the plan of c

## 2018-11-27 ENCOUNTER — OFFICE VISIT (OUTPATIENT)
Dept: PHYSICAL THERAPY | Facility: HOSPITAL | Age: 43
End: 2018-11-27
Attending: ORTHOPAEDIC SURGERY
Payer: COMMERCIAL

## 2018-11-27 DIAGNOSIS — M25.511 CHRONIC RIGHT SHOULDER PAIN: ICD-10-CM

## 2018-11-27 DIAGNOSIS — M75.01 ADHESIVE CAPSULITIS OF RIGHT SHOULDER: ICD-10-CM

## 2018-11-27 DIAGNOSIS — M24.111 DEGENERATIVE TEAR OF GLENOID LABRUM OF RIGHT SHOULDER: ICD-10-CM

## 2018-11-27 DIAGNOSIS — G89.29 CHRONIC RIGHT SHOULDER PAIN: ICD-10-CM

## 2018-11-27 PROCEDURE — 97110 THERAPEUTIC EXERCISES: CPT | Performed by: PHYSICAL THERAPIST

## 2018-11-27 NOTE — PROGRESS NOTES
Diagnosis: Chronic right shoulder pain (M25.511,G89.29)  Degenerative tear of glenoid labrum of right shoulder (M24.111)  Adhesive capsulitis of right shoulder (M75.01)      Authorized # of Visits:  12         Next MD visit: none scheduled  Fall Risk: leni

## 2018-11-29 ENCOUNTER — OFFICE VISIT (OUTPATIENT)
Dept: PHYSICAL THERAPY | Facility: HOSPITAL | Age: 43
End: 2018-11-29
Attending: ORTHOPAEDIC SURGERY
Payer: COMMERCIAL

## 2018-11-29 DIAGNOSIS — M24.111 DEGENERATIVE TEAR OF GLENOID LABRUM OF RIGHT SHOULDER: ICD-10-CM

## 2018-11-29 DIAGNOSIS — M75.01 ADHESIVE CAPSULITIS OF RIGHT SHOULDER: ICD-10-CM

## 2018-11-29 DIAGNOSIS — M25.511 CHRONIC RIGHT SHOULDER PAIN: ICD-10-CM

## 2018-11-29 DIAGNOSIS — G89.29 CHRONIC RIGHT SHOULDER PAIN: ICD-10-CM

## 2018-11-29 PROCEDURE — 97110 THERAPEUTIC EXERCISES: CPT

## 2018-11-29 NOTE — PROGRESS NOTES
Diagnosis: Chronic right shoulder pain (M25.511,G89.29)  Degenerative tear of glenoid labrum of right shoulder (M24.111)  Adhesive capsulitis of right shoulder (M75.01)      Authorized # of Visits:  12         Next MD visit: 1/3/19  Fall Risk: standard with home exercise program and self management    Plan: Continue for stretching, strengthening, ROM, postural training, HEP training for R shoulder.     Skilled Services: TE, pt ed    Charges: TE3       Total Timed Treatment: 45 min  Total Treatment Time: 4

## 2018-12-04 ENCOUNTER — OFFICE VISIT (OUTPATIENT)
Dept: PHYSICAL THERAPY | Facility: HOSPITAL | Age: 43
End: 2018-12-04
Attending: ORTHOPAEDIC SURGERY
Payer: COMMERCIAL

## 2018-12-04 DIAGNOSIS — M25.511 CHRONIC RIGHT SHOULDER PAIN: ICD-10-CM

## 2018-12-04 DIAGNOSIS — G89.29 CHRONIC RIGHT SHOULDER PAIN: ICD-10-CM

## 2018-12-04 DIAGNOSIS — M24.111 DEGENERATIVE TEAR OF GLENOID LABRUM OF RIGHT SHOULDER: ICD-10-CM

## 2018-12-04 DIAGNOSIS — M75.01 ADHESIVE CAPSULITIS OF RIGHT SHOULDER: ICD-10-CM

## 2018-12-04 PROCEDURE — 97110 THERAPEUTIC EXERCISES: CPT | Performed by: PHYSICAL THERAPIST

## 2018-12-04 NOTE — PROGRESS NOTES
Diagnosis: Chronic right shoulder pain (M25.511,G89.29)  Degenerative tear of glenoid labrum of right shoulder (M24.111)  Adhesive capsulitis of right shoulder (M75.01)      Authorized # of Visits:  12         Next MD visit: 1/3/19  Fall Risk: standard independent with home exercise program and self management    Plan: Continue for stretching, strengthening, ROM, postural training. Pt to increase frequency of AROM at home to every 1-2 hours, work on finding a more comfortable sleeping position.     [employer]

## 2018-12-06 ENCOUNTER — OFFICE VISIT (OUTPATIENT)
Dept: PHYSICAL THERAPY | Facility: HOSPITAL | Age: 43
End: 2018-12-06
Attending: ORTHOPAEDIC SURGERY
Payer: COMMERCIAL

## 2018-12-06 DIAGNOSIS — M24.111 DEGENERATIVE TEAR OF GLENOID LABRUM OF RIGHT SHOULDER: ICD-10-CM

## 2018-12-06 DIAGNOSIS — M25.511 CHRONIC RIGHT SHOULDER PAIN: ICD-10-CM

## 2018-12-06 DIAGNOSIS — G89.29 CHRONIC RIGHT SHOULDER PAIN: ICD-10-CM

## 2018-12-06 DIAGNOSIS — M75.01 ADHESIVE CAPSULITIS OF RIGHT SHOULDER: ICD-10-CM

## 2018-12-06 PROCEDURE — 97110 THERAPEUTIC EXERCISES: CPT | Performed by: PHYSICAL THERAPIST

## 2018-12-06 NOTE — PROGRESS NOTES
Diagnosis: Chronic right shoulder pain (M25.511,G89.29)  Degenerative tear of glenoid labrum of right shoulder (M24.111)  Adhesive capsulitis of right shoulder (M75.01)      Authorized # of Visits:  12         Next MD visit: 1/3/19  Fall Risk: standard to equal that of left with flex to 150 degrees and IR BB to T8.    · Increase strength of R shoulder  to 4+/5 to allow pt to lift and carry up to 15# without pain. · Pt will be able to perform work related tasks with min pain.    · Pt. will be independent

## 2018-12-11 ENCOUNTER — MED REC SCAN ONLY (OUTPATIENT)
Dept: FAMILY MEDICINE CLINIC | Facility: CLINIC | Age: 43
End: 2018-12-11

## 2018-12-11 ENCOUNTER — OFFICE VISIT (OUTPATIENT)
Dept: PHYSICAL THERAPY | Facility: HOSPITAL | Age: 43
End: 2018-12-11
Attending: ORTHOPAEDIC SURGERY
Payer: COMMERCIAL

## 2018-12-11 DIAGNOSIS — M75.01 ADHESIVE CAPSULITIS OF RIGHT SHOULDER: ICD-10-CM

## 2018-12-11 DIAGNOSIS — M25.511 CHRONIC RIGHT SHOULDER PAIN: ICD-10-CM

## 2018-12-11 DIAGNOSIS — G89.29 CHRONIC RIGHT SHOULDER PAIN: ICD-10-CM

## 2018-12-11 DIAGNOSIS — M24.111 DEGENERATIVE TEAR OF GLENOID LABRUM OF RIGHT SHOULDER: ICD-10-CM

## 2018-12-11 PROCEDURE — 97110 THERAPEUTIC EXERCISES: CPT | Performed by: PHYSICAL THERAPIST

## 2018-12-11 NOTE — PROGRESS NOTES
Diagnosis: Chronic right shoulder pain (M25.511,G89.29)  Degenerative tear of glenoid labrum of right shoulder (M24.111)  Adhesive capsulitis of right shoulder (M75.01)      Authorized # of Visits:  12         Next MD visit: 1/3/19  Fall Risk: standard inf/post glides gr 3 x 6 min, dec, B Supine inf/post glides gr 3 x 6 min, dec, B Supine inf/post glides gr 3 x 6 min, dec, B         Assessment: ROM improved, tolerating increased activity well. Goals:    to be reached in 8-12 visits.   · Pt. will report

## 2018-12-13 ENCOUNTER — OFFICE VISIT (OUTPATIENT)
Dept: PHYSICAL THERAPY | Facility: HOSPITAL | Age: 43
End: 2018-12-13
Attending: ORTHOPAEDIC SURGERY
Payer: COMMERCIAL

## 2018-12-13 DIAGNOSIS — G89.29 CHRONIC RIGHT SHOULDER PAIN: ICD-10-CM

## 2018-12-13 DIAGNOSIS — M25.511 CHRONIC RIGHT SHOULDER PAIN: ICD-10-CM

## 2018-12-13 DIAGNOSIS — M24.111 DEGENERATIVE TEAR OF GLENOID LABRUM OF RIGHT SHOULDER: ICD-10-CM

## 2018-12-13 DIAGNOSIS — M75.01 ADHESIVE CAPSULITIS OF RIGHT SHOULDER: ICD-10-CM

## 2018-12-13 PROCEDURE — 97110 THERAPEUTIC EXERCISES: CPT

## 2018-12-13 NOTE — PROGRESS NOTES
Diagnosis: Chronic right shoulder pain (M25.511,G89.29)  Degenerative tear of glenoid labrum of right shoulder (M24.111)  Adhesive capsulitis of right shoulder (M75.01)      Authorized # of Visits:  12         Next MD visit: 1/3/19  Fall Risk: standard flex AROM 2x10 Stand sh flex AROM 2 x 10 TC's for posture    Prone thoracic ext x 10    Prone thoracic ext x 10 Prone thoracic ext 2 x 10    Seated cervical retraction/ext x 10    REIL 2x5      PROM R shoulder x 10 min Supine inf/post glides gr 3 x 6 min,

## 2018-12-18 ENCOUNTER — OFFICE VISIT (OUTPATIENT)
Dept: PHYSICAL THERAPY | Facility: HOSPITAL | Age: 43
End: 2018-12-18
Attending: ORTHOPAEDIC SURGERY
Payer: COMMERCIAL

## 2018-12-18 DIAGNOSIS — M25.511 CHRONIC RIGHT SHOULDER PAIN: ICD-10-CM

## 2018-12-18 DIAGNOSIS — M75.01 ADHESIVE CAPSULITIS OF RIGHT SHOULDER: ICD-10-CM

## 2018-12-18 DIAGNOSIS — G89.29 CHRONIC RIGHT SHOULDER PAIN: ICD-10-CM

## 2018-12-18 DIAGNOSIS — M24.111 DEGENERATIVE TEAR OF GLENOID LABRUM OF RIGHT SHOULDER: ICD-10-CM

## 2018-12-18 PROCEDURE — 97110 THERAPEUTIC EXERCISES: CPT | Performed by: PHYSICAL THERAPIST

## 2018-12-18 NOTE — PROGRESS NOTES
Diagnosis: Chronic right shoulder pain (M25.511,G89.29)  Degenerative tear of glenoid labrum of right shoulder (M24.111)  Adhesive capsulitis of right shoulder (M75.01)      Authorized # of Visits:  12         Next MD visit: 1/3/19  Fall Risk: standard B Supine inf/post glides gr 3 x 6 min, dec, B Supine inf/post glides gr 3 x 6 min, dec, B R GH jt mob inf and post glides grade III  Supine inf/post glides gr 3 x 5 min, dec, B       Assessment: ROM continues to improve with control and motion, soreness de

## 2018-12-24 ENCOUNTER — OFFICE VISIT (OUTPATIENT)
Dept: PHYSICAL THERAPY | Facility: HOSPITAL | Age: 43
End: 2018-12-24
Attending: ORTHOPAEDIC SURGERY
Payer: COMMERCIAL

## 2018-12-24 PROCEDURE — 97110 THERAPEUTIC EXERCISES: CPT

## 2018-12-24 NOTE — PROGRESS NOTES
Diagnosis: Chronic right shoulder pain (M25.511,G89.29)  Degenerative tear of glenoid labrum of right shoulder (M24.111)  Adhesive capsulitis of right shoulder (M75.01)      Authorized # of Visits:  12         Next MD visit: 1/3/19  Fall Risk: standard punches x 30    GTB punch w serratus x 30 GTB punch w serratus x 30 GTB serratus punch x 30  GTB punch w serratus x 30 S/L ER with towel in axilla 3 x 10      Stand shoulder flex AROM 2x10 Stand sh flex AROM 2 x 10 TC's for posture Stand shoulder flex AROM

## 2018-12-27 ENCOUNTER — OFFICE VISIT (OUTPATIENT)
Dept: PHYSICAL THERAPY | Facility: HOSPITAL | Age: 43
End: 2018-12-27
Attending: ORTHOPAEDIC SURGERY
Payer: COMMERCIAL

## 2018-12-27 ENCOUNTER — TELEPHONE (OUTPATIENT)
Dept: FAMILY MEDICINE CLINIC | Facility: CLINIC | Age: 43
End: 2018-12-27

## 2018-12-27 PROCEDURE — 97110 THERAPEUTIC EXERCISES: CPT

## 2018-12-27 NOTE — PROGRESS NOTES
Diagnosis: Chronic right shoulder pain (M25.511,G89.29)  Degenerative tear of glenoid labrum of right shoulder (M24.111)  Adhesive capsulitis of right shoulder (M75.01)      Authorized # of Visits:  12         Next MD visit: 1/3/19  Fall Risk: standard flexion stretch 30\"  X 3      GTB lat pull with scap squeeze x 30 GTB lat pull with scap squeeze x 30 GTB lat pulls with scap squeeze x 30  GTB lat pull with scap squeeze x 30 GTB lat pull with scapular squeeze x 30   GTB fwd punches x 30 BlueTB lat pull

## 2019-01-03 ENCOUNTER — MED REC SCAN ONLY (OUTPATIENT)
Dept: FAMILY MEDICINE CLINIC | Facility: CLINIC | Age: 44
End: 2019-01-03

## 2019-01-03 ENCOUNTER — OFFICE VISIT (OUTPATIENT)
Dept: ORTHOPEDICS CLINIC | Facility: CLINIC | Age: 44
End: 2019-01-03

## 2019-01-03 ENCOUNTER — APPOINTMENT (OUTPATIENT)
Dept: PHYSICAL THERAPY | Facility: HOSPITAL | Age: 44
End: 2019-01-03
Attending: ORTHOPAEDIC SURGERY
Payer: COMMERCIAL

## 2019-01-03 DIAGNOSIS — M25.511 CHRONIC RIGHT SHOULDER PAIN: Primary | ICD-10-CM

## 2019-01-03 DIAGNOSIS — M75.01 ADHESIVE CAPSULITIS OF RIGHT SHOULDER: ICD-10-CM

## 2019-01-03 DIAGNOSIS — G89.29 CHRONIC RIGHT SHOULDER PAIN: Primary | ICD-10-CM

## 2019-01-03 PROCEDURE — 99213 OFFICE O/P EST LOW 20 MIN: CPT | Performed by: ORTHOPAEDIC SURGERY

## 2019-01-03 PROCEDURE — 99212 OFFICE O/P EST SF 10 MIN: CPT | Performed by: ORTHOPAEDIC SURGERY

## 2019-01-03 RX ORDER — GABAPENTIN 100 MG/1
100 CAPSULE ORAL NIGHTLY
Qty: 90 CAPSULE | Refills: 0 | Status: SHIPPED | OUTPATIENT
Start: 2019-01-03 | End: 2019-05-13

## 2019-01-03 RX ORDER — PREDNISONE 20 MG/1
20 TABLET ORAL DAILY
Qty: 5 TABLET | Refills: 0 | Status: SHIPPED | OUTPATIENT
Start: 2019-01-03 | End: 2019-01-08

## 2019-01-03 NOTE — PROGRESS NOTES
1/3/2019  Alexi Members  4/25/1975  41year old   female  Cleo Rivera MD    HPI:   Patient presents with:  Shoulder Pain: Here for a f/u on her right shoulder pain. Stts she has been going to physical therapy twice a week with some improvement. the treatment plan. The patient will return to clinic in 6 weeks    No orders of the defined types were placed in this encounter.

## 2019-01-04 ENCOUNTER — TELEPHONE (OUTPATIENT)
Dept: FAMILY MEDICINE CLINIC | Facility: CLINIC | Age: 44
End: 2019-01-04

## 2019-01-04 NOTE — TELEPHONE ENCOUNTER
[facility] and discussed with patient.

## 2019-01-04 NOTE — TELEPHONE ENCOUNTER
Pt was seen by Dulce Maria Oconnor yesterday and was given restrictions and to f/u in 6 weeks. Per pt there is no work for pt with the requested restriction, hence, pt needs another work note stating to be off for another 6 weeks till f/u.  Pt needs another letter

## 2019-01-08 ENCOUNTER — PATIENT MESSAGE (OUTPATIENT)
Dept: FAMILY MEDICINE CLINIC | Facility: CLINIC | Age: 44
End: 2019-01-08

## 2019-01-28 ENCOUNTER — OFFICE VISIT (OUTPATIENT)
Dept: DERMATOLOGY CLINIC | Facility: CLINIC | Age: 44
End: 2019-01-28

## 2019-01-28 DIAGNOSIS — L91.0 KELOID OF SKIN: ICD-10-CM

## 2019-01-28 DIAGNOSIS — L91.0 SCARRING, HYPERTROPHIC: Primary | ICD-10-CM

## 2019-01-28 PROCEDURE — 11900 INJECT SKIN LESIONS </W 7: CPT | Performed by: DERMATOLOGY

## 2019-01-28 RX ORDER — TRIAMCINOLONE ACETONIDE 40 MG/ML
40 INJECTION, SUSPENSION INTRA-ARTICULAR; INTRAMUSCULAR ONCE
Status: COMPLETED | OUTPATIENT
Start: 2019-01-28 | End: 2019-01-28

## 2019-01-28 RX ADMIN — TRIAMCINOLONE ACETONIDE 40 MG: 40 INJECTION, SUSPENSION INTRA-ARTICULAR; INTRAMUSCULAR at 10:07:00

## 2019-01-28 NOTE — PROGRESS NOTES
HPI:     Chief Complaint     Derm Problem        HPI     Derm Problem      Additional comments: LOV 10/27/2018 Patient present to f/u on Keloid to bilateral breast . Patient currently using silicone strips and gel with no improvement          Last edited b children: 2      Years of education: Not on file      Highest education level: Not on file    Social Needs      Financial resource strain: Not on file      Food insecurity - worry: Not on file      Food insecurity - inability: Not on file      Transportati progress  No orders of the defined types were placed in this encounter. Results From Past 48 Hours:  No results found for this or any previous visit (from the past 48 hour(s)).     Meds This Visit:      Imaging Orders:  None     Referral Orders:  No or

## 2019-03-01 ENCOUNTER — OFFICE VISIT (OUTPATIENT)
Dept: FAMILY MEDICINE CLINIC | Facility: CLINIC | Age: 44
End: 2019-03-01

## 2019-03-01 VITALS
SYSTOLIC BLOOD PRESSURE: 120 MMHG | OXYGEN SATURATION: 99 % | BODY MASS INDEX: 31.98 KG/M2 | HEIGHT: 66 IN | HEART RATE: 76 BPM | WEIGHT: 199 LBS | DIASTOLIC BLOOD PRESSURE: 72 MMHG

## 2019-03-01 DIAGNOSIS — B35.1 ONYCHOMYCOSIS OF GREAT TOE: ICD-10-CM

## 2019-03-01 DIAGNOSIS — Z00.00 ROUTINE GENERAL MEDICAL EXAMINATION AT A HEALTH CARE FACILITY: ICD-10-CM

## 2019-03-01 DIAGNOSIS — M79.671 BILATERAL FOOT PAIN: Primary | ICD-10-CM

## 2019-03-01 DIAGNOSIS — M79.672 BILATERAL FOOT PAIN: Primary | ICD-10-CM

## 2019-03-01 PROCEDURE — 99214 OFFICE O/P EST MOD 30 MIN: CPT | Performed by: FAMILY MEDICINE

## 2019-03-01 NOTE — PROGRESS NOTES
CC:  Patient presents with:  Flat Feet: states both of her feet hurt  Fungus Nails: states she has fungus on right big toe and thinks that its spreading      HPI: 41year old female here with bilateral foot pain and flat feet and with chronic onychomycosis file        Gets together: Not on file        Attends [employer]ism service: Not on file        Active member of club or organization: Not on file        Attends meetings of clubs or organizations: Not on file        Relationship status: Not on file      Intim management   - PODIATRY - INTERNAL    2. Onychomycosis of great toe    - Referral to podiatry for management recommendations given failure with 3 months of Terbinafine   - PODIATRY - INTERNAL    3.  Routine general medical examination at [facility] facil

## 2019-03-04 ENCOUNTER — MED REC SCAN ONLY (OUTPATIENT)
Dept: FAMILY MEDICINE CLINIC | Facility: CLINIC | Age: 44
End: 2019-03-04

## 2019-03-11 ENCOUNTER — OFFICE VISIT (OUTPATIENT)
Dept: DERMATOLOGY CLINIC | Facility: CLINIC | Age: 44
End: 2019-03-11

## 2019-03-11 ENCOUNTER — LAB ENCOUNTER (OUTPATIENT)
Dept: LAB | Facility: REFERENCE LAB | Age: 44
End: 2019-03-11
Attending: FAMILY MEDICINE
Payer: COMMERCIAL

## 2019-03-11 DIAGNOSIS — Z00.00 ROUTINE GENERAL MEDICAL EXAMINATION AT A HEALTH CARE FACILITY: ICD-10-CM

## 2019-03-11 DIAGNOSIS — L91.0 KELOID OF SKIN: Primary | ICD-10-CM

## 2019-03-11 LAB
ALBUMIN SERPL-MCNC: 3.3 G/DL (ref 3.4–5)
ALBUMIN/GLOB SERPL: 0.7 {RATIO} (ref 1–2)
ALP LIVER SERPL-CCNC: 96 U/L (ref 37–98)
ALT SERPL-CCNC: 15 U/L (ref 13–56)
ANION GAP SERPL CALC-SCNC: 4 MMOL/L (ref 0–18)
AST SERPL-CCNC: 16 U/L (ref 15–37)
BASOPHILS # BLD AUTO: 0.03 X10(3) UL (ref 0–0.2)
BASOPHILS NFR BLD AUTO: 0.6 %
BILIRUB SERPL-MCNC: 0.3 MG/DL (ref 0.1–2)
BUN BLD-MCNC: 9 MG/DL (ref 7–18)
BUN/CREAT SERPL: 12.5 (ref 10–20)
CALCIUM BLD-MCNC: 8.6 MG/DL (ref 8.5–10.1)
CHLORIDE SERPL-SCNC: 104 MMOL/L (ref 98–107)
CHOLEST SMN-MCNC: 191 MG/DL (ref ?–200)
CO2 SERPL-SCNC: 29 MMOL/L (ref 21–32)
CREAT BLD-MCNC: 0.72 MG/DL (ref 0.55–1.02)
DEPRECATED RDW RBC AUTO: 40 FL (ref 35.1–46.3)
EOSINOPHIL # BLD AUTO: 0.16 X10(3) UL (ref 0–0.7)
EOSINOPHIL NFR BLD AUTO: 3.1 %
ERYTHROCYTE [DISTWIDTH] IN BLOOD BY AUTOMATED COUNT: 12.7 % (ref 11–15)
GLOBULIN PLAS-MCNC: 4.5 G/DL (ref 2.8–4.4)
GLUCOSE BLD-MCNC: 90 MG/DL (ref 70–99)
HCT VFR BLD AUTO: 38.6 % (ref 35–48)
HDLC SERPL-MCNC: 68 MG/DL (ref 40–59)
HGB BLD-MCNC: 12.3 G/DL (ref 12–16)
IMM GRANULOCYTES # BLD AUTO: 0.01 X10(3) UL (ref 0–1)
IMM GRANULOCYTES NFR BLD: 0.2 %
LDLC SERPL CALC-MCNC: 116 MG/DL (ref ?–100)
LYMPHOCYTES # BLD AUTO: 2.14 X10(3) UL (ref 1–4)
LYMPHOCYTES NFR BLD AUTO: 41.9 %
M PROTEIN MFR SERPL ELPH: 7.8 G/DL (ref 6.4–8.2)
MCH RBC QN AUTO: 27.6 PG (ref 26–34)
MCHC RBC AUTO-ENTMCNC: 31.9 G/DL (ref 31–37)
MCV RBC AUTO: 86.7 FL (ref 80–100)
MONOCYTES # BLD AUTO: 0.38 X10(3) UL (ref 0.1–1)
MONOCYTES NFR BLD AUTO: 7.4 %
NEUTROPHILS # BLD AUTO: 2.39 X10 (3) UL (ref 1.5–7.7)
NEUTROPHILS # BLD AUTO: 2.39 X10(3) UL (ref 1.5–7.7)
NEUTROPHILS NFR BLD AUTO: 46.8 %
NONHDLC SERPL-MCNC: 123 MG/DL (ref ?–130)
OSMOLALITY SERPL CALC.SUM OF ELEC: 282 MOSM/KG (ref 275–295)
PLATELET # BLD AUTO: 294 10(3)UL (ref 150–450)
POTASSIUM SERPL-SCNC: 3.6 MMOL/L (ref 3.5–5.1)
RBC # BLD AUTO: 4.45 X10(6)UL (ref 3.8–5.3)
SODIUM SERPL-SCNC: 137 MMOL/L (ref 136–145)
TRIGL SERPL-MCNC: 34 MG/DL (ref 30–149)
VLDLC SERPL CALC-MCNC: 7 MG/DL (ref 0–30)
WBC # BLD AUTO: 5.1 X10(3) UL (ref 4–11)

## 2019-03-11 PROCEDURE — 80061 LIPID PANEL: CPT

## 2019-03-11 PROCEDURE — 85025 COMPLETE CBC W/AUTO DIFF WBC: CPT

## 2019-03-11 PROCEDURE — 80053 COMPREHEN METABOLIC PANEL: CPT

## 2019-03-11 PROCEDURE — 36415 COLL VENOUS BLD VENIPUNCTURE: CPT

## 2019-03-11 PROCEDURE — 99212 OFFICE O/P EST SF 10 MIN: CPT | Performed by: DERMATOLOGY

## 2019-03-11 PROCEDURE — 11900 INJECT SKIN LESIONS </W 7: CPT | Performed by: DERMATOLOGY

## 2019-03-11 RX ORDER — TRIAMCINOLONE ACETONIDE 40 MG/ML
40 INJECTION, SUSPENSION INTRA-ARTICULAR; INTRAMUSCULAR ONCE
Status: COMPLETED | OUTPATIENT
Start: 2019-03-11 | End: 2019-03-11

## 2019-03-11 RX ADMIN — TRIAMCINOLONE ACETONIDE 40 MG: 40 INJECTION, SUSPENSION INTRA-ARTICULAR; INTRAMUSCULAR at 13:50:00

## 2019-03-11 NOTE — PROGRESS NOTES
HPI:     Chief Complaint     Derm Problem        HPI     Derm Problem      Additional comments: LOV 1/28/2019. Pt presenting for 6 week f/u with keloid to chest. Previously treated at [address] [place] [address] with kenalog injection.            Last edited by PIYUSH Fernandes file        Non-medical: Not on file    Tobacco Use      Smoking status: Never Smoker      Smokeless tobacco: Never Used    Substance and Sexual Activity      Alcohol use: No        Alcohol/week: 0.0 oz      Drug use: No      Sexual activity: Yes        Pa the areole or area are injected with Kenalog at a concentration of 20 mg/cc–almost 2 cc total.    No orders of the defined types were placed in this encounter.       Results From Past 48 Hours:  No results found for this or any previous visit (from the past

## 2019-03-19 ENCOUNTER — OFFICE VISIT (OUTPATIENT)
Dept: OBGYN CLINIC | Facility: CLINIC | Age: 44
End: 2019-03-19

## 2019-03-19 ENCOUNTER — OFFICE VISIT (OUTPATIENT)
Dept: PHYSICAL THERAPY | Facility: HOSPITAL | Age: 44
End: 2019-03-19
Attending: ORTHOPAEDIC SURGERY
Payer: COMMERCIAL

## 2019-03-19 ENCOUNTER — HOSPITAL ENCOUNTER (OUTPATIENT)
Dept: MAMMOGRAPHY | Age: 44
Discharge: HOME | End: 2019-03-19
Attending: OBSTETRICS & GYNECOLOGY
Payer: COMMERCIAL

## 2019-03-19 VITALS
SYSTOLIC BLOOD PRESSURE: 110 MMHG | HEIGHT: 66 IN | BODY MASS INDEX: 32.78 KG/M2 | DIASTOLIC BLOOD PRESSURE: 76 MMHG | WEIGHT: 204 LBS

## 2019-03-19 DIAGNOSIS — Z01.419 ENCOUNTER FOR GYNECOLOGICAL EXAMINATION WITHOUT ABNORMAL FINDING: Primary | ICD-10-CM

## 2019-03-19 DIAGNOSIS — Z12.39 BREAST CANCER SCREENING: ICD-10-CM

## 2019-03-19 DIAGNOSIS — G89.29 CHRONIC RIGHT SHOULDER PAIN: ICD-10-CM

## 2019-03-19 DIAGNOSIS — M75.01 ADHESIVE CAPSULITIS OF RIGHT SHOULDER: ICD-10-CM

## 2019-03-19 DIAGNOSIS — M25.511 CHRONIC RIGHT SHOULDER PAIN: ICD-10-CM

## 2019-03-19 PROCEDURE — 87205 SMEAR GRAM STAIN: CPT | Performed by: OBSTETRICS & GYNECOLOGY

## 2019-03-19 PROCEDURE — 77063 BREAST TOMOSYNTHESIS BI: CPT | Performed by: OBSTETRICS & GYNECOLOGY

## 2019-03-19 PROCEDURE — 99396 PREV VISIT EST AGE 40-64: CPT | Performed by: OBSTETRICS & GYNECOLOGY

## 2019-03-19 PROCEDURE — 88175 CYTOPATH C/V AUTO FLUID REDO: CPT | Performed by: OBSTETRICS & GYNECOLOGY

## 2019-03-19 PROCEDURE — 77067 SCR MAMMO BI INCL CAD: CPT | Performed by: OBSTETRICS & GYNECOLOGY

## 2019-03-19 PROCEDURE — 87624 HPV HI-RISK TYP POOLED RSLT: CPT | Performed by: OBSTETRICS & GYNECOLOGY

## 2019-03-19 PROCEDURE — 87808 TRICHOMONAS ASSAY W/OPTIC: CPT | Performed by: OBSTETRICS & GYNECOLOGY

## 2019-03-19 PROCEDURE — 97110 THERAPEUTIC EXERCISES: CPT | Performed by: PHYSICAL THERAPIST

## 2019-03-19 PROCEDURE — 87106 FUNGI IDENTIFICATION YEAST: CPT | Performed by: OBSTETRICS & GYNECOLOGY

## 2019-03-19 PROCEDURE — 97161 PT EVAL LOW COMPLEX 20 MIN: CPT | Performed by: PHYSICAL THERAPIST

## 2019-03-19 NOTE — PROGRESS NOTES
SHOULDER EVALUATION:    Referring Physician: Sánchez Hines    DX Code: Chronic right shoulder pain (M25.511,G89.29)  Adhesive capsulitis of right shoulder (M75.01     PT DX: Chronic right shoulder pain (M25.511,G89.29)  Adhesive capsulitis of right shoul before going further).    ASSESSMENT & PLAN OF CARE:     Romy Valdes is a 41year old, right handed female referred to physical therapy with diagnosis of Chronic right shoulder pain (M25.511,G89.29)  Degenerative tear of glenoid labrum of right should

## 2019-03-19 NOTE — PROGRESS NOTES
GYN H&P     3/19/2019  10:51 AM    CC: Patient is here for annual    HPI: Patient is a 41year old  for annual. Very minimal vaginal bleeding after endometrial ablation. Wear panty liners. No pain. Kids age 25 and 9. No LMP recorded.  Patient ha education: Not on file      Highest education level: Not on file    Occupational History      Occupation: [address] [place] [address]    Tobacco Use      Smoking status: Never Smoker      Smokeless tobacco: Never Used    Substance and Sexual Activity Alicja Dorado MD

## 2019-03-20 LAB — HPV I/H RISK 1 DNA SPEC QL NAA+PROBE: NEGATIVE

## 2019-03-20 NOTE — PROGRESS NOTES
Pt aware mammogram showed an area of \"distortion\" in the left breast behind your nipple. THIS IS NOT USUALLY CANCER. The radiologist will be calling to get additional ultrasound views of that breast to make sure nothing is missed.  Pt expressed understand

## 2019-03-21 DIAGNOSIS — N76.0 BV (BACTERIAL VAGINOSIS): Primary | ICD-10-CM

## 2019-03-21 DIAGNOSIS — B96.89 BV (BACTERIAL VAGINOSIS): Primary | ICD-10-CM

## 2019-03-21 LAB
GENITAL VAGINOSIS SCREEN: POSITIVE
TRICHOMONAS SCREEN: NEGATIVE

## 2019-03-21 RX ORDER — METRONIDAZOLE 500 MG/1
500 TABLET ORAL 2 TIMES DAILY
Qty: 14 TABLET | Refills: 0 | Status: SHIPPED | OUTPATIENT
Start: 2019-03-21 | End: 2019-03-28

## 2019-03-22 ENCOUNTER — TELEPHONE (OUTPATIENT)
Dept: OBGYN CLINIC | Facility: CLINIC | Age: 44
End: 2019-03-22

## 2019-03-22 NOTE — TELEPHONE ENCOUNTER
Pt aware Pap normal, HPV, Trich and candida also negative. BV positive flagyl sent to pt's chepe. Pt expressed understanding.

## 2019-03-22 NOTE — TELEPHONE ENCOUNTER
----- Message from Holden Kim MD sent at 3/21/2019  1:02 PM CDT -----  Boom Geiger, your vaginal test showed some bacterial vaginosis. I have sent in a prescription for flagyl for this. Please make sure she sees this.

## 2019-03-27 ENCOUNTER — OFFICE VISIT (OUTPATIENT)
Dept: PHYSICAL THERAPY | Facility: HOSPITAL | Age: 44
End: 2019-03-27
Attending: ORTHOPAEDIC SURGERY
Payer: COMMERCIAL

## 2019-03-27 DIAGNOSIS — G89.29 CHRONIC RIGHT SHOULDER PAIN: ICD-10-CM

## 2019-03-27 DIAGNOSIS — M75.01 ADHESIVE CAPSULITIS OF RIGHT SHOULDER: ICD-10-CM

## 2019-03-27 DIAGNOSIS — M25.511 CHRONIC RIGHT SHOULDER PAIN: ICD-10-CM

## 2019-03-27 PROCEDURE — 97140 MANUAL THERAPY 1/> REGIONS: CPT

## 2019-03-27 PROCEDURE — 97110 THERAPEUTIC EXERCISES: CPT

## 2019-03-27 NOTE — PROGRESS NOTES
Diagnosis: DX Code: Chronic right shoulder pain (M25.511,G89.29)  Adhesive capsulitis of right shoulder (M75.01      Authorized # of Visits:  8 (HMO)         Next MD visit: early [de-identified] scheduled  Fall Risk: standard         Precautions: none reported

## 2019-04-04 ENCOUNTER — OFFICE VISIT (OUTPATIENT)
Dept: PHYSICAL THERAPY | Facility: HOSPITAL | Age: 44
End: 2019-04-04
Attending: ORTHOPAEDIC SURGERY
Payer: COMMERCIAL

## 2019-04-04 DIAGNOSIS — M75.01 ADHESIVE CAPSULITIS OF RIGHT SHOULDER: ICD-10-CM

## 2019-04-04 DIAGNOSIS — G89.29 CHRONIC RIGHT SHOULDER PAIN: ICD-10-CM

## 2019-04-04 DIAGNOSIS — M25.511 CHRONIC RIGHT SHOULDER PAIN: ICD-10-CM

## 2019-04-04 PROCEDURE — 97110 THERAPEUTIC EXERCISES: CPT | Performed by: PHYSICAL THERAPIST

## 2019-04-04 PROCEDURE — 97140 MANUAL THERAPY 1/> REGIONS: CPT | Performed by: PHYSICAL THERAPIST

## 2019-04-16 ENCOUNTER — OFFICE VISIT (OUTPATIENT)
Dept: PHYSICAL THERAPY | Facility: HOSPITAL | Age: 44
End: 2019-04-16
Attending: ORTHOPAEDIC SURGERY
Payer: COMMERCIAL

## 2019-04-16 DIAGNOSIS — M75.01 ADHESIVE CAPSULITIS OF RIGHT SHOULDER: ICD-10-CM

## 2019-04-16 DIAGNOSIS — M25.511 CHRONIC RIGHT SHOULDER PAIN: ICD-10-CM

## 2019-04-16 DIAGNOSIS — G89.29 CHRONIC RIGHT SHOULDER PAIN: ICD-10-CM

## 2019-04-16 PROCEDURE — 97140 MANUAL THERAPY 1/> REGIONS: CPT

## 2019-04-16 PROCEDURE — 97110 THERAPEUTIC EXERCISES: CPT

## 2019-04-22 ENCOUNTER — TELEPHONE (OUTPATIENT)
Dept: FAMILY MEDICINE CLINIC | Facility: CLINIC | Age: 44
End: 2019-04-22

## 2019-04-22 ENCOUNTER — OFFICE VISIT (OUTPATIENT)
Dept: PHYSICAL THERAPY | Facility: HOSPITAL | Age: 44
End: 2019-04-22
Attending: ORTHOPAEDIC SURGERY
Payer: COMMERCIAL

## 2019-04-22 ENCOUNTER — HOSPITAL ENCOUNTER (OUTPATIENT)
Dept: MAMMOGRAPHY | Facility: HOSPITAL | Age: 44
Discharge: HOME | End: 2019-04-22
Attending: OBSTETRICS & GYNECOLOGY
Payer: COMMERCIAL

## 2019-04-22 ENCOUNTER — HOSPITAL ENCOUNTER (OUTPATIENT)
Dept: ULTRASOUND IMAGING | Facility: HOSPITAL | Age: 44
Discharge: HOME | End: 2019-04-22
Attending: OBSTETRICS & GYNECOLOGY
Payer: COMMERCIAL

## 2019-04-22 DIAGNOSIS — R92.8 ABNORMAL MAMMOGRAM: ICD-10-CM

## 2019-04-22 DIAGNOSIS — M25.511 CHRONIC RIGHT SHOULDER PAIN: ICD-10-CM

## 2019-04-22 DIAGNOSIS — M75.01 ADHESIVE CAPSULITIS OF RIGHT SHOULDER: ICD-10-CM

## 2019-04-22 DIAGNOSIS — G89.29 CHRONIC RIGHT SHOULDER PAIN: ICD-10-CM

## 2019-04-22 PROCEDURE — 97140 MANUAL THERAPY 1/> REGIONS: CPT | Performed by: PHYSICAL THERAPIST

## 2019-04-22 PROCEDURE — 77065 DX MAMMO INCL CAD UNI: CPT | Performed by: OBSTETRICS & GYNECOLOGY

## 2019-04-22 PROCEDURE — 77061 BREAST TOMOSYNTHESIS UNI: CPT | Performed by: OBSTETRICS & GYNECOLOGY

## 2019-04-22 PROCEDURE — 87107 FUNGI IDENTIFICATION MOLD: CPT | Performed by: PODIATRIST

## 2019-04-22 PROCEDURE — 97110 THERAPEUTIC EXERCISES: CPT | Performed by: PHYSICAL THERAPIST

## 2019-04-22 PROCEDURE — 76642 ULTRASOUND BREAST LIMITED: CPT | Performed by: OBSTETRICS & GYNECOLOGY

## 2019-04-22 PROCEDURE — 87101 SKIN FUNGI CULTURE: CPT | Performed by: PODIATRIST

## 2019-04-22 RX ORDER — FLUCONAZOLE 150 MG/1
TABLET ORAL
Qty: 2 TABLET | Refills: 0 | Status: SHIPPED | OUTPATIENT
Start: 2019-04-22 | End: 2019-05-13

## 2019-04-22 NOTE — TELEPHONE ENCOUNTER
Pt called back, c/o itching, burning, and \"cottage cheese like discharge\" that started on  Saturday night (4/20)  Pt also states she started her period on 4/18, but it is irregular ever since her ablation.     Pt requesting RX for yeast infection to be ca

## 2019-04-22 NOTE — TELEPHONE ENCOUNTER
Pt called back and notified per [facility] a RX is called in for her    Meghana Mcknight, DO  Emmg 10 Dr. Lc Bacon 52 minutes ago (9:04 AM)      Chris Arango to call in Fluconazole 150 mg x 1 now and second in 72 hours. Needs to be seen if not improving.        Pt informed to

## 2019-04-22 NOTE — PROGRESS NOTES
Diagnosis: DX Code: Chronic right shoulder pain (M25.511,G89.29)  Adhesive capsulitis of right shoulder (M75.01      Authorized # of Visits:  8 (HMO)         Next MD visit: early [de-identified] scheduled  Fall Risk: standard         Precautions: none reported ROM, postural training, HEP training, manual therapy. Restore function gradually. Charges:  TherEx2, man mob1       Total Timed Treatment: 43 min  Total Treatment Time: 45 min

## 2019-04-26 ENCOUNTER — LAB REQUISITION (OUTPATIENT)
Dept: LAB | Facility: HOSPITAL | Age: 44
End: 2019-04-26
Payer: COMMERCIAL

## 2019-04-26 DIAGNOSIS — L60.3 NAIL DYSTROPHY: ICD-10-CM

## 2019-04-30 ENCOUNTER — OFFICE VISIT (OUTPATIENT)
Dept: PHYSICAL THERAPY | Facility: HOSPITAL | Age: 44
End: 2019-04-30
Attending: ORTHOPAEDIC SURGERY
Payer: COMMERCIAL

## 2019-04-30 DIAGNOSIS — G89.29 CHRONIC RIGHT SHOULDER PAIN: ICD-10-CM

## 2019-04-30 DIAGNOSIS — M75.01 ADHESIVE CAPSULITIS OF RIGHT SHOULDER: ICD-10-CM

## 2019-04-30 DIAGNOSIS — M25.511 CHRONIC RIGHT SHOULDER PAIN: ICD-10-CM

## 2019-04-30 PROCEDURE — 97140 MANUAL THERAPY 1/> REGIONS: CPT | Performed by: PHYSICAL THERAPIST

## 2019-04-30 PROCEDURE — 97110 THERAPEUTIC EXERCISES: CPT | Performed by: PHYSICAL THERAPIST

## 2019-04-30 NOTE — PROGRESS NOTES
Diagnosis: DX Code: Chronic right shoulder pain (M25.511,G89.29)  Adhesive capsulitis of right shoulder (M75.01      Authorized # of Visits:  8 (HMO)         Next MD visit: early [de-identified] scheduled  Fall Risk: standard         Precautions: none reported 2/10 at worst.  · Increase active range of motion of R shoulder to equal that of left with flex to 150 degrees and IR BB to T8.    · Increase strength of R shoulder  to 4+/5 to allow pt to lift and carry up to 15# without pain.   · Pt will be able to perfor

## 2019-05-13 ENCOUNTER — TELEPHONE (OUTPATIENT)
Dept: FAMILY MEDICINE CLINIC | Facility: CLINIC | Age: 44
End: 2019-05-13

## 2019-05-13 ENCOUNTER — OFFICE VISIT (OUTPATIENT)
Dept: FAMILY MEDICINE CLINIC | Facility: CLINIC | Age: 44
End: 2019-05-13

## 2019-05-13 VITALS
SYSTOLIC BLOOD PRESSURE: 118 MMHG | BODY MASS INDEX: 32.47 KG/M2 | HEART RATE: 78 BPM | OXYGEN SATURATION: 98 % | DIASTOLIC BLOOD PRESSURE: 68 MMHG | HEIGHT: 66 IN | WEIGHT: 202 LBS

## 2019-05-13 DIAGNOSIS — L08.9 SKIN INFECTION: ICD-10-CM

## 2019-05-13 DIAGNOSIS — R19.00 ABDOMINAL WALL SWELLING: Primary | ICD-10-CM

## 2019-05-13 PROCEDURE — 99214 OFFICE O/P EST MOD 30 MIN: CPT | Performed by: FAMILY MEDICINE

## 2019-05-13 RX ORDER — MONTELUKAST SODIUM 10 MG/1
10 TABLET ORAL NIGHTLY
Refills: 0 | COMMUNITY
Start: 2019-05-13 | End: 2020-03-17

## 2019-05-13 RX ORDER — MELOXICAM 7.5 MG/1
7.5 TABLET ORAL DAILY PRN
COMMUNITY
Start: 2019-03-27 | End: 2019-05-13

## 2019-05-13 NOTE — TELEPHONE ENCOUNTER
Pt states she noticed a small amount of discharge from navel when she does stomach massage in the morning and pain that is increasing in her abdomen. Pt has abdominal surgery last year. Pt is c/o a \"hard mass in my belly\".      Pt scheduled to see [facility] tod

## 2019-05-13 NOTE — PROGRESS NOTES
CC:  Patient presents with:  Bump: has a lump on the center of abdominal area that is swelling up through out the day and tender, discharge in her navel area       HPI: 45year old female with history of abdominoplasty and subsequent necrotizing fasciitis Partners: Male        Comment: ablation    Lifestyle      Physical activity:        Days per week: Not on file        Minutes per session: Not on file      Stress: Not on file    Relationships      Social connections:        Talks on phone: Not on file tenderness in epigastric region, two discreet firm areas of upper abdominal swelling, no guarding, no rebound  Dermatologic: Abdominal keloids, redness around the umbilicus but no active pus or drainage     Assessment and Plan: 45year old female with hist

## 2019-05-13 NOTE — TELEPHONE ENCOUNTER
Patient requesting an call back in regard to having pain in stomach and states having a little Leakage in navel.  Patient will like to discuss

## 2019-05-15 PROBLEM — R10.816 EPIGASTRIC ABDOMINAL TENDERNESS WITHOUT REBOUND TENDERNESS: Status: ACTIVE | Noted: 2019-05-15

## 2019-05-16 ENCOUNTER — OFFICE VISIT (OUTPATIENT)
Dept: ORTHOPEDICS CLINIC | Facility: CLINIC | Age: 44
End: 2019-05-16

## 2019-05-16 ENCOUNTER — APPOINTMENT (OUTPATIENT)
Dept: PHYSICAL THERAPY | Facility: HOSPITAL | Age: 44
End: 2019-05-16
Attending: ORTHOPAEDIC SURGERY
Payer: COMMERCIAL

## 2019-05-16 ENCOUNTER — TELEPHONE (OUTPATIENT)
Dept: PHYSICAL THERAPY | Facility: HOSPITAL | Age: 44
End: 2019-05-16

## 2019-05-16 DIAGNOSIS — M75.01 ADHESIVE CAPSULITIS OF RIGHT SHOULDER: Primary | ICD-10-CM

## 2019-05-16 PROCEDURE — 99213 OFFICE O/P EST LOW 20 MIN: CPT | Performed by: ORTHOPAEDIC SURGERY

## 2019-05-16 PROCEDURE — 99212 OFFICE O/P EST SF 10 MIN: CPT | Performed by: ORTHOPAEDIC SURGERY

## 2019-05-16 NOTE — PROGRESS NOTES
5/16/2019  José Miguel Cárdenas  4/25/1975  45year old   female  Christie Delgado MD    HPI:   Patient presents with:  Shoulder Pain: F/u her right shoulder pain. Has been going to PT once a week with improvement.   Stts ROM has improved      The patient com evidence of generalized laxity.     ASSESSMENT AND PLAN:   Adhesive capsulitis of right shoulder  (primary encounter diagnosis)    The risks, indications, benefits, and procedures of both operative and non-operative treatment were discussed with the patient

## 2019-05-24 ENCOUNTER — HOSPITAL ENCOUNTER (OUTPATIENT)
Dept: CT IMAGING | Facility: HOSPITAL | Age: 44
Discharge: HOME | End: 2019-05-24
Attending: SURGERY
Payer: COMMERCIAL

## 2019-05-24 DIAGNOSIS — M72.6 NECROTIZING FASCIITIS (HCC): ICD-10-CM

## 2019-05-24 DIAGNOSIS — R10.816 EPIGASTRIC ABDOMINAL TENDERNESS WITHOUT REBOUND TENDERNESS: ICD-10-CM

## 2019-05-24 PROCEDURE — 82565 ASSAY OF CREATININE: CPT

## 2019-05-24 PROCEDURE — 74177 CT ABD & PELVIS W/CONTRAST: CPT | Performed by: SURGERY

## 2019-06-05 NOTE — PROGRESS NOTES
Please call patient with her no no big abscess present. 2 small seromas in the upper abdomen. We can discuss this when she follows up in the office. Please schedule an appointment.

## 2019-06-12 PROBLEM — S30.1XXD ABDOMINAL WALL SEROMA, SUBSEQUENT ENCOUNTER: Status: ACTIVE | Noted: 2019-06-12

## 2019-07-23 ENCOUNTER — OFFICE VISIT (OUTPATIENT)
Dept: DERMATOLOGY CLINIC | Facility: CLINIC | Age: 44
End: 2019-07-23

## 2019-07-23 DIAGNOSIS — L91.0 KELOID OF SKIN: Primary | ICD-10-CM

## 2019-07-23 PROCEDURE — 99212 OFFICE O/P EST SF 10 MIN: CPT | Performed by: DERMATOLOGY

## 2019-07-23 PROCEDURE — 11900 INJECT SKIN LESIONS </W 7: CPT | Performed by: DERMATOLOGY

## 2019-07-23 RX ORDER — TRIAMCINOLONE ACETONIDE 40 MG/ML
40 INJECTION, SUSPENSION INTRA-ARTICULAR; INTRAMUSCULAR ONCE
Status: COMPLETED | OUTPATIENT
Start: 2019-07-23 | End: 2019-07-23

## 2019-07-23 RX ADMIN — TRIAMCINOLONE ACETONIDE 40 MG: 40 INJECTION, SUSPENSION INTRA-ARTICULAR; INTRAMUSCULAR at 11:20:00

## 2019-07-23 NOTE — PROGRESS NOTES
HPI:     Chief Complaint     Derm Problem        HPI     Derm Problem      Additional comments: LOV 3/11/9. pt presenting today f/u iwth Keloid to chect. pt states treated with Kenalog at last visit, requesting treatment.           Last edited by Jack Alcaraz children: 2      Years of education: Not on file      Highest education level: Not on file    Occupational History      Occupation: [address] [place] [address]    Social Needs      Financial resource strain: Not on file      Food insecurity:        W Self-Exams: Not Asked    Social History Narrative      Live with  and son    Family History   Problem Relation Age of Onset   • Diabetes Mother    • Breast Cancer Maternal Cousin Female         45       PHYSICAL EXAM:   Patient is alert and orient

## 2019-08-10 ENCOUNTER — HOSPITAL ENCOUNTER (OUTPATIENT)
Age: 44
Discharge: HOME | End: 2019-08-10
Attending: FAMILY MEDICINE
Payer: COMMERCIAL

## 2019-08-10 VITALS
HEART RATE: 53 BPM | RESPIRATION RATE: 18 BRPM | WEIGHT: 204 LBS | BODY MASS INDEX: 32.78 KG/M2 | SYSTOLIC BLOOD PRESSURE: 121 MMHG | OXYGEN SATURATION: 100 % | DIASTOLIC BLOOD PRESSURE: 87 MMHG | HEIGHT: 66 IN | TEMPERATURE: 98 F

## 2019-08-10 DIAGNOSIS — J01.40 ACUTE NON-RECURRENT PANSINUSITIS: Primary | ICD-10-CM

## 2019-08-10 LAB
#MXD IC: 0.3 X10ˆ3/UL (ref 0.1–1)
B-HCG UR QL: NEGATIVE
BILIRUB UR QL STRIP: NEGATIVE
CLARITY UR: CLEAR
COLOR UR: YELLOW
CREAT BLD-MCNC: 0.8 MG/DL (ref 0.55–1.02)
GLUCOSE BLD-MCNC: 100 MG/DL (ref 70–99)
GLUCOSE UR STRIP-MCNC: NEGATIVE MG/DL
HCT VFR BLD AUTO: 38.3 % (ref 35–48)
HGB BLD-MCNC: 11.8 G/DL (ref 12–16)
ISTAT BUN: 10 MG/DL (ref 8–20)
ISTAT CHLORIDE: 103 MMOL/L (ref 101–111)
ISTAT HEMATOCRIT: 38 % (ref 34–50)
ISTAT IONIZED CALCIUM FOR CHEM 8: 1.19 MMOL/L (ref 1.12–1.32)
ISTAT POTASSIUM: 4.2 MMOL/L (ref 3.6–5.1)
ISTAT SODIUM: 139 MMOL/L (ref 136–145)
ISTAT TCO2: 28 MMOL/L (ref 22–32)
KETONES UR STRIP-MCNC: NEGATIVE MG/DL
LEUKOCYTE ESTERASE UR QL STRIP: NEGATIVE
LYMPHOCYTES # BLD AUTO: 2.1 X10ˆ3/UL (ref 1–4)
LYMPHOCYTES NFR BLD AUTO: 44.2 %
MCH RBC QN AUTO: 26.9 PG (ref 26–34)
MCHC RBC AUTO-ENTMCNC: 30.8 G/DL (ref 31–37)
MCV RBC AUTO: 87.4 FL (ref 80–100)
MIXED CELL %: 6.6 %
NEUTROPHILS # BLD AUTO: 2.3 X10ˆ3/UL (ref 1.5–7.7)
NEUTROPHILS NFR BLD AUTO: 49.2 %
NITRITE UR QL STRIP: NEGATIVE
PH UR STRIP: 6.5 [PH]
PLATELET # BLD AUTO: 270 X10ˆ3/UL (ref 150–450)
PROT UR STRIP-MCNC: NEGATIVE MG/DL
RBC # BLD AUTO: 4.38 X10ˆ6/UL (ref 3.8–5.3)
SP GR UR STRIP: 1.01
UROBILINOGEN UR STRIP-ACNC: <2 MG/DL
WBC # BLD AUTO: 4.7 X10ˆ3/UL (ref 4–11)

## 2019-08-10 PROCEDURE — 99214 OFFICE O/P EST MOD 30 MIN: CPT

## 2019-08-10 PROCEDURE — 96361 HYDRATE IV INFUSION ADD-ON: CPT

## 2019-08-10 PROCEDURE — 85025 COMPLETE CBC W/AUTO DIFF WBC: CPT | Performed by: FAMILY MEDICINE

## 2019-08-10 PROCEDURE — 81025 URINE PREGNANCY TEST: CPT

## 2019-08-10 PROCEDURE — 80047 BASIC METABLC PNL IONIZED CA: CPT

## 2019-08-10 PROCEDURE — 81002 URINALYSIS NONAUTO W/O SCOPE: CPT

## 2019-08-10 PROCEDURE — 96374 THER/PROPH/DIAG INJ IV PUSH: CPT

## 2019-08-10 RX ORDER — SODIUM CHLORIDE 9 MG/ML
1000 INJECTION, SOLUTION INTRAVENOUS ONCE
Status: COMPLETED | OUTPATIENT
Start: 2019-08-10 | End: 2019-08-10

## 2019-08-10 RX ORDER — ONDANSETRON 2 MG/ML
4 INJECTION INTRAMUSCULAR; INTRAVENOUS ONCE
Status: COMPLETED | OUTPATIENT
Start: 2019-08-10 | End: 2019-08-10

## 2019-08-10 RX ORDER — AMOXICILLIN AND CLAVULANATE POTASSIUM 875; 125 MG/1; MG/1
1 TABLET, FILM COATED ORAL 2 TIMES DAILY
Qty: 14 TABLET | Refills: 0 | Status: SHIPPED | OUTPATIENT
Start: 2019-08-10 | End: 2019-08-17

## 2019-08-10 NOTE — ED INITIAL ASSESSMENT (HPI)
Pt in IC by self c/o headaches, body aches/chills for the past week. Reports some reflux. Denied fever, vomiting, diarrhea.

## 2019-08-10 NOTE — ED PROVIDER NOTES
Patient Seen in: [address]    History   Patient presents with:  Headache (neurologic)    Stated Complaint: HEADACHES, BODY ACHES    HPI  37yo F presents to IC with headaches, body aches, and chills for a week.  Had one e tobacco: Never Used    Alcohol use: No      Alcohol/week: 0.0 standard drinks    Drug use: No             Review of Systems    Positive for stated complaint: HEADACHES, BODY ACHES  Other systems are as noted in HPI.   Constitutional and vital signs reviewed Neurological: She is alert and oriented to person, place, and time. Skin: Capillary refill takes less than 2 seconds. No rash noted. She is not diaphoretic. No erythema. No pallor. Nursing note and vitals reviewed.         ED Course     Labs Reviewed

## 2019-08-10 NOTE — ED NOTES
Pt discharged stable and in good condition by self. Reviewed avs. Follow up as indicated. Pt verbalized understanding and agreed.

## 2019-09-03 ENCOUNTER — TELEPHONE (OUTPATIENT)
Dept: FAMILY MEDICINE CLINIC | Facility: CLINIC | Age: 44
End: 2019-09-03

## 2019-09-03 NOTE — TELEPHONE ENCOUNTER
Talked with patient and assured her that there is a referral in the system to see Dr. Ernesto Vazquez with 6 authorized visits. Patient verbalized understanding.

## 2019-10-07 ENCOUNTER — OFFICE VISIT (OUTPATIENT)
Dept: DERMATOLOGY CLINIC | Facility: CLINIC | Age: 44
End: 2019-10-07

## 2019-10-07 ENCOUNTER — TELEPHONE (OUTPATIENT)
Dept: FAMILY MEDICINE CLINIC | Facility: CLINIC | Age: 44
End: 2019-10-07

## 2019-10-07 DIAGNOSIS — L91.0 KELOID OF SKIN: Primary | ICD-10-CM

## 2019-10-07 DIAGNOSIS — L91.0 SCARRING, HYPERTROPHIC: ICD-10-CM

## 2019-10-07 PROCEDURE — 99212 OFFICE O/P EST SF 10 MIN: CPT | Performed by: DERMATOLOGY

## 2019-10-07 PROCEDURE — 11900 INJECT SKIN LESIONS </W 7: CPT | Performed by: DERMATOLOGY

## 2019-10-07 RX ORDER — TRIAMCINOLONE ACETONIDE 40 MG/ML
40 INJECTION, SUSPENSION INTRA-ARTICULAR; INTRAMUSCULAR ONCE
Status: COMPLETED | OUTPATIENT
Start: 2019-10-07 | End: 2019-10-07

## 2019-10-07 RX ADMIN — TRIAMCINOLONE ACETONIDE 40 MG: 40 INJECTION, SUSPENSION INTRA-ARTICULAR; INTRAMUSCULAR at 16:18:00

## 2019-10-07 NOTE — PROGRESS NOTES
HPI:     Chief Complaint     Derm Problem        HPI     Derm Problem      Additional comments: LOV 7/23/2019 Patient present to f/u on  keloid injection. Patient states she notice some improvement           Last edited by Srinath Moreau, [address] on 10/7/2019 History      Marital status:       Spouse name: Not on file      Number of children: 2      Years of education: Not on file      Highest education level: Not on file    Occupational History      Occupation: [address] side of [address] [place] [address] Concern: Not Asked        Back Care: Not Asked        Bike Helmet: Not Asked        Self-Exams: Not Asked    Social History Narrative      Live with  and son    Family History   Problem Relation Age of Onset   • Diabetes Mother    • Breast Cancer Ma

## 2019-10-07 NOTE — TELEPHONE ENCOUNTER
Patient calling stating she needs referral to Derm Dr Hansel Núñez. Pt states she has appointment today at 3:00.

## 2019-11-18 ENCOUNTER — OFFICE VISIT (OUTPATIENT)
Dept: DERMATOLOGY CLINIC | Facility: CLINIC | Age: 44
End: 2019-11-18

## 2019-11-18 DIAGNOSIS — L91.0 KELOID OF SKIN: Primary | ICD-10-CM

## 2019-11-18 DIAGNOSIS — L91.0 SCARRING, HYPERTROPHIC: ICD-10-CM

## 2019-11-18 DIAGNOSIS — L90.9 ATROPHY, SKIN: ICD-10-CM

## 2019-11-18 PROCEDURE — 11900 INJECT SKIN LESIONS </W 7: CPT | Performed by: DERMATOLOGY

## 2019-11-18 PROCEDURE — 99212 OFFICE O/P EST SF 10 MIN: CPT | Performed by: DERMATOLOGY

## 2019-11-18 RX ORDER — TRIAMCINOLONE ACETONIDE 40 MG/ML
40 INJECTION, SUSPENSION INTRA-ARTICULAR; INTRAMUSCULAR ONCE
Status: COMPLETED | OUTPATIENT
Start: 2019-11-18 | End: 2019-11-18

## 2019-11-18 RX ADMIN — TRIAMCINOLONE ACETONIDE 40 MG: 40 INJECTION, SUSPENSION INTRA-ARTICULAR; INTRAMUSCULAR at 12:06:00

## 2019-11-18 NOTE — PROGRESS NOTES
HPI:     Chief Complaint     Derm Problem        HPI     Derm Problem      Additional comments: LOV 10/7/2019 Patient present to f/u on keloid injection.           Last edited by Mayte Matthew, [address] on 11/18/2019 10:59 AM. (History)          Patient is her REDUCTION LEFT      march 2018   • REDUCTION OF LARGE BREAST Bilateral 03/28/2018   • REDUCTION RIGHT      march 2018     Social History    Socioeconomic History      Marital status:       Spouse name: Not on file      Number of children: 2      Phuong anesthetic: No         Service: Not Asked        Blood Transfusions: No        Occupational Exposure: Not Asked        Hobby Hazards: Not Asked        Sleep Concern: Not Asked        Back Care: Not Asked        Bike Helmet: Not Asked        Self-Ex

## 2019-12-29 ENCOUNTER — HOSPITAL ENCOUNTER (OUTPATIENT)
Age: 44
Discharge: HOME | End: 2019-12-29
Attending: FAMILY MEDICINE
Payer: COMMERCIAL

## 2019-12-29 VITALS
RESPIRATION RATE: 19 BRPM | DIASTOLIC BLOOD PRESSURE: 78 MMHG | OXYGEN SATURATION: 99 % | TEMPERATURE: 98 F | SYSTOLIC BLOOD PRESSURE: 124 MMHG | HEART RATE: 78 BPM

## 2019-12-29 DIAGNOSIS — J06.9 VIRAL UPPER RESPIRATORY TRACT INFECTION: Primary | ICD-10-CM

## 2019-12-29 DIAGNOSIS — R19.4 CHANGE IN BOWEL HABITS: ICD-10-CM

## 2019-12-29 LAB
#MXD IC: 0.3 X10ˆ3/UL (ref 0.1–1)
CREAT BLD-MCNC: 0.6 MG/DL (ref 0.55–1.02)
GLUCOSE BLD-MCNC: 94 MG/DL (ref 70–99)
HCT VFR BLD AUTO: 38.1 % (ref 35–48)
HGB BLD-MCNC: 12.1 G/DL (ref 12–16)
ISTAT BUN: 11 MG/DL (ref 8–20)
ISTAT CHLORIDE: 105 MMOL/L (ref 101–111)
ISTAT HEMATOCRIT: 36 % (ref 34–50)
ISTAT IONIZED CALCIUM FOR CHEM 8: 1.06 MMOL/L (ref 1.12–1.32)
ISTAT POTASSIUM: 3.7 MMOL/L (ref 3.6–5.1)
ISTAT SODIUM: 135 MMOL/L (ref 136–145)
ISTAT TCO2: 25 MMOL/L (ref 22–32)
LYMPHOCYTES # BLD AUTO: 2.4 X10ˆ3/UL (ref 1–4)
LYMPHOCYTES NFR BLD AUTO: 44.3 %
MCH RBC QN AUTO: 27.9 PG (ref 26–34)
MCHC RBC AUTO-ENTMCNC: 31.8 G/DL (ref 31–37)
MCV RBC AUTO: 87.8 FL (ref 80–100)
MIXED CELL %: 5.4 %
NEUTROPHILS # BLD AUTO: 2.7 X10ˆ3/UL (ref 1.5–7.7)
NEUTROPHILS NFR BLD AUTO: 50.3 %
PLATELET # BLD AUTO: 285 X10ˆ3/UL (ref 150–450)
RBC # BLD AUTO: 4.34 X10ˆ6/UL (ref 3.8–5.3)
WBC # BLD AUTO: 5.4 X10ˆ3/UL (ref 4–11)

## 2019-12-29 PROCEDURE — 96360 HYDRATION IV INFUSION INIT: CPT

## 2019-12-29 PROCEDURE — 99214 OFFICE O/P EST MOD 30 MIN: CPT

## 2019-12-29 PROCEDURE — 85025 COMPLETE CBC W/AUTO DIFF WBC: CPT | Performed by: FAMILY MEDICINE

## 2019-12-29 PROCEDURE — 80047 BASIC METABLC PNL IONIZED CA: CPT

## 2019-12-29 RX ORDER — SODIUM CHLORIDE 9 MG/ML
1000 INJECTION, SOLUTION INTRAVENOUS ONCE
Status: COMPLETED | OUTPATIENT
Start: 2019-12-29 | End: 2019-12-29

## 2019-12-29 RX ORDER — IBUPROFEN 400 MG/1
800 TABLET ORAL ONCE
Status: COMPLETED | OUTPATIENT
Start: 2019-12-29 | End: 2019-12-29

## 2019-12-29 NOTE — ED PROVIDER NOTES
Patient Seen [address]: [address]      History   Patient presents with:  Headache    Stated Complaint: head / body aches    HPI    37yo F presents to IC with 2 weeks of intermittent headaches, cough, sneezing, rhinorrhea.  No o Smokeless tobacco: Never Used    Alcohol use: No      Alcohol/week: 0.0 standard drinks    Drug use: No             Review of Systems    Positive for stated complaint: head / body aches  Other systems are as noted in HPI.   Constitutional and vital signs re no distension. Palpations: Abdomen is soft. There is no mass. Tenderness: There is no tenderness. There is no guarding or rebound. Musculoskeletal:      Right lower leg: No edema. Left lower leg: No edema.    Lymphadenopathy:      Cervical:

## 2019-12-30 ENCOUNTER — TELEPHONE (OUTPATIENT)
Dept: FAMILY MEDICINE CLINIC | Facility: CLINIC | Age: 44
End: 2019-12-30

## 2019-12-30 NOTE — TELEPHONE ENCOUNTER
Spoke with patient and updated work letter as the date was wrong. States she is feeling slightly better but still with sinus pressure/headaches.  Recommend she try Flonase 1-2 sprays per nostril daily and okay to return to work tomorrow, but notify me if no

## 2019-12-30 NOTE — TELEPHONE ENCOUNTER
Adalgisa Silverio [address]             Needs to see me for follow-up if symptoms not improving. Pt states her BM's are daily but not as bad as before. Pt states she is still struggling with headaches that feel like \"pressure\".   Pt que

## 2019-12-31 ENCOUNTER — TELEPHONE (OUTPATIENT)
Dept: FAMILY MEDICINE CLINIC | Facility: CLINIC | Age: 44
End: 2019-12-31

## 2019-12-31 NOTE — TELEPHONE ENCOUNTER
Pt states she has still sinus pressure, not feeling better. Pt stayed home. msg sent to [facility]. Pt verbalized understanding and agrees with POC.     Johanny Cheney,       12/30/19 6:00 PM   Note      Spoke with patient and updated work letter as the date was wr

## 2020-01-02 ENCOUNTER — TELEPHONE (OUTPATIENT)
Dept: FAMILY MEDICINE CLINIC | Facility: CLINIC | Age: 45
End: 2020-01-02

## 2020-01-15 ENCOUNTER — OFFICE VISIT (OUTPATIENT)
Dept: FAMILY MEDICINE CLINIC | Facility: CLINIC | Age: 45
End: 2020-01-15

## 2020-01-15 VITALS
HEART RATE: 71 BPM | DIASTOLIC BLOOD PRESSURE: 86 MMHG | OXYGEN SATURATION: 98 % | HEIGHT: 66 IN | WEIGHT: 213 LBS | BODY MASS INDEX: 34.23 KG/M2 | SYSTOLIC BLOOD PRESSURE: 124 MMHG

## 2020-01-15 DIAGNOSIS — R92.8 ABNORMAL MAMMOGRAM: ICD-10-CM

## 2020-01-15 DIAGNOSIS — R05.8 UPPER AIRWAY COUGH SYNDROME: ICD-10-CM

## 2020-01-15 DIAGNOSIS — M94.0 COSTOCHONDRITIS, ACUTE: Primary | ICD-10-CM

## 2020-01-15 PROCEDURE — 99214 OFFICE O/P EST MOD 30 MIN: CPT | Performed by: FAMILY MEDICINE

## 2020-01-15 RX ORDER — NAPROXEN 500 MG/1
500 TABLET ORAL 2 TIMES DAILY WITH MEALS
Qty: 28 TABLET | Refills: 0 | Status: SHIPPED | OUTPATIENT
Start: 2020-01-15 | End: 2020-01-29

## 2020-01-15 RX ORDER — FLUTICASONE PROPIONATE 50 MCG
2 SPRAY, SUSPENSION (ML) NASAL DAILY
Qty: 1 INHALER | Refills: 5 | Status: SHIPPED | OUTPATIENT
Start: 2020-01-15 | End: 2021-01-11

## 2020-01-15 RX ORDER — FLUTICASONE PROPIONATE 50 MCG
2 SPRAY, SUSPENSION (ML) NASAL DAILY
COMMUNITY
End: 2020-01-15

## 2020-01-15 NOTE — PATIENT INSTRUCTIONS
Costochondritis    Costochondritis is inflammation of a rib or the cartilage that connects a rib to your breastbone (sternum). It causes tenderness, and sometimes chest pain may be sharp or aching, or it may feel like pressure.  Pain may get worse with de Call the healthcare provider right away if you have any of the following:  · Pain that is not relieved by medicine  · Shortness of breath  · Lightheadedness, dizziness, or fainting  · Feeling of irregular heartbeat or fast pulse  Anyone with chest pain lashay

## 2020-01-15 NOTE — PROGRESS NOTES
CC:  Patient presents with:  Back Pain  Cough: needs FMLA papers filled out      HPI: 45year old female here for back pain and needing FMLA paperwork. Called off recently for a cold/cough and virus.   Has also been having more left sided back pain recentl Comment: ablation    Lifestyle      Physical activity:        Days per week: Not on file        Minutes per session: Not on file      Stress: Not on file    Relationships      Social connections:        Talks on phone: Not on file        Gets together: Not (96.6 kg)  09/27/19 : 202 lb (91.6 kg)  09/04/19 : 202 lb (91.6 kg)  08/10/19 : 204 lb (92.5 kg)  05/13/19 : 202 lb (91.6 kg)  03/19/19 : 204 lb (92.5 kg)      Body mass index is 34.38 kg/m².     Physical:  General:  Alert, appropriate, no acute distress

## 2020-02-13 ENCOUNTER — TELEPHONE (OUTPATIENT)
Dept: FAMILY MEDICINE CLINIC | Facility: CLINIC | Age: 45
End: 2020-02-13

## 2020-02-13 DIAGNOSIS — Z12.31 BREAST CANCER SCREENING BY MAMMOGRAM: Primary | ICD-10-CM

## 2020-02-13 DIAGNOSIS — R92.8 ABNORMAL MAMMOGRAM OF LEFT BREAST: ICD-10-CM

## 2020-02-13 NOTE — TELEPHONE ENCOUNTER
2 mammogram orderd placed.   One was unilaterl and one bilateral.  Aziza Griffin wants to have both done but mammogram department wants screening to be diagnostic

## 2020-02-13 NOTE — TELEPHONE ENCOUNTER
Pt had a screening mammogram 3/19/2019 followed by a diagnostic mammo on 3/20/2019 with recommendations for short term follow up diagnostic mammogram of the left breast in 6 months.     Pt has left diagnostic mammo order in place and is also due for her estee

## 2020-02-26 ENCOUNTER — HOSPITAL ENCOUNTER (OUTPATIENT)
Dept: MAMMOGRAPHY | Facility: HOSPITAL | Age: 45
Discharge: HOME | End: 2020-02-26
Attending: FAMILY MEDICINE
Payer: COMMERCIAL

## 2020-02-26 DIAGNOSIS — R92.8 ABNORMAL MAMMOGRAM OF LEFT BREAST: ICD-10-CM

## 2020-02-26 DIAGNOSIS — Z12.31 BREAST CANCER SCREENING BY MAMMOGRAM: ICD-10-CM

## 2020-02-26 PROCEDURE — 77066 DX MAMMO INCL CAD BI: CPT | Performed by: FAMILY MEDICINE

## 2020-02-26 PROCEDURE — 77062 BREAST TOMOSYNTHESIS BI: CPT | Performed by: FAMILY MEDICINE

## 2020-03-17 ENCOUNTER — HOSPITAL ENCOUNTER (OUTPATIENT)
Age: 45
Discharge: HOME | End: 2020-03-17
Attending: EMERGENCY MEDICINE
Payer: COMMERCIAL

## 2020-03-17 VITALS
SYSTOLIC BLOOD PRESSURE: 123 MMHG | RESPIRATION RATE: 18 BRPM | HEART RATE: 72 BPM | DIASTOLIC BLOOD PRESSURE: 81 MMHG | HEIGHT: 66 IN | TEMPERATURE: 98 F | OXYGEN SATURATION: 100 % | BODY MASS INDEX: 33.59 KG/M2 | WEIGHT: 209 LBS

## 2020-03-17 DIAGNOSIS — J32.0 LEFT MAXILLARY SINUSITIS: Primary | ICD-10-CM

## 2020-03-17 DIAGNOSIS — J20.9 ACUTE BRONCHITIS, UNSPECIFIED ORGANISM: ICD-10-CM

## 2020-03-17 PROCEDURE — 99214 OFFICE O/P EST MOD 30 MIN: CPT

## 2020-03-17 PROCEDURE — 99213 OFFICE O/P EST LOW 20 MIN: CPT

## 2020-03-17 RX ORDER — FLUCONAZOLE 150 MG/1
TABLET ORAL
Qty: 2 TABLET | Refills: 0 | Status: SHIPPED | OUTPATIENT
Start: 2020-03-17 | End: 2020-05-22 | Stop reason: ALTCHOICE

## 2020-03-17 RX ORDER — AZITHROMYCIN 500 MG/1
500 TABLET, FILM COATED ORAL DAILY
Qty: 5 TABLET | Refills: 0 | Status: SHIPPED | OUTPATIENT
Start: 2020-03-17 | End: 2020-03-22

## 2020-03-17 RX ORDER — MONTELUKAST SODIUM 10 MG/1
10 TABLET ORAL NIGHTLY
Qty: 30 TABLET | Refills: 0 | Status: SHIPPED | OUTPATIENT
Start: 2020-03-17 | End: 2021-01-11

## 2020-03-17 RX ORDER — CEFDINIR 300 MG/1
300 CAPSULE ORAL 2 TIMES DAILY
Qty: 20 CAPSULE | Refills: 0 | Status: SHIPPED | OUTPATIENT
Start: 2020-03-17 | End: 2020-03-27

## 2020-03-17 NOTE — ED INITIAL ASSESSMENT (HPI)
Per pt having cough, post nasal drip and left side sinus pressure for a while. Denies any fevers or chills.

## 2020-03-17 NOTE — ED PROVIDER NOTES
Patient Seen in: [address]      History   Patient presents with:  Cough/URI    Stated Complaint: cough    HPI    Patient is a 43-year-old female with past medical history of frequent sinusitis, had a URI 2 months ago wi reviewed and negative except as noted above.     Physical Exam     ED Triage Vitals [03/17/20 1632]   /81   Pulse 72   Resp 18   Temp 98 °F (36.7 °C)   Temp src Oral   SpO2 100 %   O2 Device None (Room air)       Current:/81   Pulse 72   Temp [address] [place] [address]  [phone]    Schedule an appointment as soon as possible for a visit in 2 weeks  For Recheck        Medications Prescribed:  Current Discharge Medication List    START taking these medications    azithromycin 500 MG Oral Tab  Take 1 tablet (50

## 2020-03-30 ENCOUNTER — TELEPHONE (OUTPATIENT)
Dept: FAMILY MEDICINE CLINIC | Facility: CLINIC | Age: 45
End: 2020-03-30

## 2020-03-30 DIAGNOSIS — J01.00 ACUTE NON-RECURRENT MAXILLARY SINUSITIS: Primary | ICD-10-CM

## 2020-03-30 DIAGNOSIS — R05.9 COUGH: ICD-10-CM

## 2020-03-30 PROCEDURE — 99213 OFFICE O/P EST LOW 20 MIN: CPT | Performed by: FAMILY MEDICINE

## 2020-03-30 NOTE — TELEPHONE ENCOUNTER
Virtual/Telephone Check-In    Gasper Benedict verbally consents to a Virtual/Telephone Check-In service on 03/30/20. Patient understands and accepts financial responsibility for any deductible, co-insurance and/or co-pays associated with this service. buttocks. Recommend daily CeraVe moisturizing cream as well as triamcinolone ointment on the back and Alclometasone ointment on the buttocks as directed.

## 2020-03-30 NOTE — TELEPHONE ENCOUNTER
Pt had sinusitits x 1 month; pt went to IC 2 weeks ago; post nasal drip, headache, nasal congestion, productive cough. Pt is a . Pt states she is feeling better. \"cloudy head\". Pt has been taking sudafed.  Pt completed Clemetine Stalker and is currently ta follow-up appointment with your doctor, I will give her a prescription for Omnicef as well to fill if her sinus symptoms do not improve completely with Zithromax.       Outpatient Medication Detail      Disp Refills Start End    azithromycin 500 MG Oral Ta

## 2020-03-30 NOTE — TELEPHONE ENCOUNTER
Please let her know I will be doing a scheduled telephone visit with her today between 11 and 11:30.

## 2020-03-30 NOTE — TELEPHONE ENCOUNTER
Called pt and informed of telephone visit with Dr. Alice Mckeon between 11 and 11:30 today. Pt verbalized understanding.

## 2020-05-08 ENCOUNTER — TELEPHONE (OUTPATIENT)
Dept: FAMILY MEDICINE CLINIC | Facility: CLINIC | Age: 45
End: 2020-05-08

## 2020-05-08 RX ORDER — NAPROXEN 500 MG/1
500 TABLET ORAL 2 TIMES DAILY WITH MEALS
Qty: 28 TABLET | Refills: 0 | Status: SHIPPED | OUTPATIENT
Start: 2020-05-08 | End: 2020-05-22

## 2020-05-08 NOTE — TELEPHONE ENCOUNTER
Received page after hours from patient regarding left knee swelling. States she developed left knee swelling last night.  Has been wrapping and rubbing it down with tiger balm and thinks it started due to working more than normal as a , but no k

## 2020-05-20 ENCOUNTER — TELEPHONE (OUTPATIENT)
Dept: FAMILY MEDICINE CLINIC | Facility: CLINIC | Age: 45
End: 2020-05-20

## 2020-05-20 NOTE — TELEPHONE ENCOUNTER
Called pt and c/o left knee, called last weeks was Rx naproxen, knee is swollen, has called in sick to work, doing cold compress, epsom salt and stopped taking Naproxen and knee still swollen (not as swollen as before but still swollen).   No injury in the

## 2020-05-22 ENCOUNTER — OFFICE VISIT (OUTPATIENT)
Dept: FAMILY MEDICINE CLINIC | Facility: CLINIC | Age: 45
End: 2020-05-22

## 2020-05-22 ENCOUNTER — LAB ENCOUNTER (OUTPATIENT)
Dept: LAB | Facility: REFERENCE LAB | Age: 45
End: 2020-05-22
Attending: FAMILY MEDICINE
Payer: COMMERCIAL

## 2020-05-22 VITALS
SYSTOLIC BLOOD PRESSURE: 126 MMHG | WEIGHT: 206 LBS | OXYGEN SATURATION: 97 % | TEMPERATURE: 99 F | DIASTOLIC BLOOD PRESSURE: 78 MMHG | HEART RATE: 88 BPM | BODY MASS INDEX: 33.11 KG/M2 | HEIGHT: 66 IN

## 2020-05-22 DIAGNOSIS — M79.2 NEUROPATHIC PAIN, LEG, LEFT: ICD-10-CM

## 2020-05-22 DIAGNOSIS — Z00.01 ENCOUNTER FOR ROUTINE ADULT HEALTH EXAMINATION WITH ABNORMAL FINDINGS: ICD-10-CM

## 2020-05-22 DIAGNOSIS — M25.562 PAIN AND SWELLING OF LEFT KNEE: Primary | ICD-10-CM

## 2020-05-22 DIAGNOSIS — M25.462 PAIN AND SWELLING OF LEFT KNEE: Primary | ICD-10-CM

## 2020-05-22 PROCEDURE — 99214 OFFICE O/P EST MOD 30 MIN: CPT | Performed by: FAMILY MEDICINE

## 2020-05-22 PROCEDURE — 36415 COLL VENOUS BLD VENIPUNCTURE: CPT

## 2020-05-22 PROCEDURE — 80061 LIPID PANEL: CPT

## 2020-05-22 PROCEDURE — 83036 HEMOGLOBIN GLYCOSYLATED A1C: CPT

## 2020-05-22 PROCEDURE — 80053 COMPREHEN METABOLIC PANEL: CPT

## 2020-05-22 PROCEDURE — 85025 COMPLETE CBC W/AUTO DIFF WBC: CPT

## 2020-05-22 NOTE — PROGRESS NOTES
CC:  Patient presents with:  Knee Pain: left knee swelling 2weeks      HPI: 39year old female here for evaluation of left knee swelling x 2 weeks.   Has had knee pain for the past several weeks and took Naproxen for about 4 days but then stopped as she was on file    Occupational History      Occupation: [address] side of [address] resource strain: Not on file      Food insecurity:        Worry: Not on file        Inability: Not on file      Transportation needs:        M michelle      Current Outpatient Medications   Medication Sig Dispense Refill   • Montelukast Sodium 10 MG Oral Tab Take 1 tablet (10 mg total) by mouth nightly.  30 tablet 0   • Fluticasone Propionate 50 MCG/ACT Nasal Suspension 2 sprays by Each Nare route trevor evaluation of acute left knee pain/swelling and neuropathy of left leg.      1. Pain and swelling of left knee    - Possibly due to OA versus ruptured Baker's cyst and unlikely DVT given no risk factors and active lifestyle  - Advised she continue Naproxen

## 2020-05-22 NOTE — PATIENT INSTRUCTIONS
Reducing Knee Pain and Swelling    Many treatments can help reduce pain and swelling in your knee. Your healthcare provider or physical therapist may suggest one or more of the following treatments:   · Icing your knee.  This helps reduce swelling.  You m

## 2020-06-15 ENCOUNTER — HOSPITAL ENCOUNTER (OUTPATIENT)
Dept: GENERAL RADIOLOGY | Facility: HOSPITAL | Age: 45
Discharge: HOME | End: 2020-06-15
Attending: PHYSICAL MEDICINE & REHABILITATION
Payer: COMMERCIAL

## 2020-06-15 ENCOUNTER — OFFICE VISIT (OUTPATIENT)
Dept: NEUROLOGY | Facility: CLINIC | Age: 45
End: 2020-06-15

## 2020-06-15 VITALS
SYSTOLIC BLOOD PRESSURE: 122 MMHG | HEIGHT: 66 IN | BODY MASS INDEX: 32.62 KG/M2 | DIASTOLIC BLOOD PRESSURE: 70 MMHG | WEIGHT: 203 LBS | HEART RATE: 80 BPM

## 2020-06-15 DIAGNOSIS — M17.0 BILATERAL PRIMARY OSTEOARTHRITIS OF KNEE: ICD-10-CM

## 2020-06-15 DIAGNOSIS — M17.0 BILATERAL PRIMARY OSTEOARTHRITIS OF KNEE: Primary | ICD-10-CM

## 2020-06-15 DIAGNOSIS — G47.9 SLEEP DISTURBANCE: ICD-10-CM

## 2020-06-15 DIAGNOSIS — M62.9 HAMSTRING TIGHTNESS OF BOTH LOWER EXTREMITIES: ICD-10-CM

## 2020-06-15 DIAGNOSIS — M17.10 PATELLOFEMORAL ARTHRITIS: ICD-10-CM

## 2020-06-15 PROCEDURE — 73564 X-RAY EXAM KNEE 4 OR MORE: CPT | Performed by: PHYSICAL MEDICINE & REHABILITATION

## 2020-06-15 PROCEDURE — 99244 OFF/OP CNSLTJ NEW/EST MOD 40: CPT | Performed by: PHYSICAL MEDICINE & REHABILITATION

## 2020-06-15 RX ORDER — MELOXICAM 15 MG/1
15 TABLET ORAL DAILY
Qty: 14 TABLET | Refills: 0 | Status: SHIPPED | OUTPATIENT
Start: 2020-06-15 | End: 2020-06-29

## 2020-06-15 NOTE — PROGRESS NOTES
[facility] PATIENT EVALUATION    Consultation as a request of Dr. Gil Dunlap    Chief Complaint: Knee pain    HISTORY OF PRESENT ILLNESS:   Patient presents with:  Establish Care: Pt presents with left knee p at bus stop         PAST SURGICAL HISTORY:     Past Surgical History:   Procedure Laterality Date   • ABDOMINOPLASTY  2018   • APPENDECTOMY     •   2011    [facility]   • D & C  2016    with hysteroscopy   • ENDOMETRIAL ABLATION   for chest pain, dyspnea, exertional chest pressure/discomfort, orthopnea and paroxysmal nocturnal dyspnea  Gastrointestinal: negative for abdominal pain, constipation and diarrhea  Genitourinary:negative for dysuria, frequency and urinary incontinence  Hem Date     05/22/2020    A1C 5.8 (H) 05/22/2020     Lab Results   Component Value Date    WBC 5.1 05/22/2020    RBC 4.50 05/22/2020    HGB 12.4 05/22/2020    HCT 39.2 05/22/2020    MCV 87.1 05/22/2020    MCH 27.6 05/22/2020    MCHC 31.6 05/22/2020 new set of x-rays to be obtained today and to start glucosamine/chondroitin fifteen 100/12 100 mg daily. I have also recommended adding turmeric to her diet.   I would like to follow-up with her in 4 weeks if she does not have sufficient improvement we leslie

## 2020-06-29 NOTE — LETTER
Date: 5/29/2018    Patient Name: Mack Puente          To Whom it may concern: This letter has been written at the patient's request. The above patient was seen at the [facility] for treatment of a medical condition.     This patient
full range of motion in all extremities

## 2020-08-13 ENCOUNTER — TELEPHONE (OUTPATIENT)
Dept: FAMILY MEDICINE CLINIC | Facility: CLINIC | Age: 45
End: 2020-08-13

## 2020-08-13 NOTE — TELEPHONE ENCOUNTER
Pt states her father's girlfriend works at a hospital and tested +covid19 today. Pt's father, has been taking care of pt's son Nicolas Echavarria 12/04/2011) who is also a pt of [facility]. Pt's father, son and pt are all asymptomatic. Pt states she has had a headache for several months though intermittently and also a dry cough for which she has been evaluated a couple of times since February. Advised pt and son to self-quarantine x 10 days and monitor for any signs or symptoms and to immediately call back if they develop any symptoms. Advised pt to use good hand hygiene, drink plenty of fluids. If headache returns or gets worse to call office. Discussed with MP who agrees with POC. Pt requested letter d/t self-quarantine. Per MP, ok to write. Letter generated and sent to pt via [employer]. Pt verbalized understanding and agrees with POC.

## 2020-08-14 ENCOUNTER — TELEPHONE (OUTPATIENT)
Dept: FAMILY MEDICINE CLINIC | Facility: CLINIC | Age: 45
End: 2020-08-14

## 2020-08-14 NOTE — TELEPHONE ENCOUNTER
Work note that Dr Carlene Valles send to her [facility] her work wont accept and she needs a new one   Please call back

## 2020-08-14 NOTE — TELEPHONE ENCOUNTER
Discussed modified letter with pt and sent via [employer] as requested. Pt to keep self-monitoring and son as well and call office if they develop any symptoms. Grandparents who live with them to contact their PCPs office for advice. Pt verbalized understanding and agrees with POC.

## 2020-11-04 ENCOUNTER — OFFICE VISIT (OUTPATIENT)
Dept: NEUROLOGY | Facility: CLINIC | Age: 45
End: 2020-11-04

## 2020-11-04 DIAGNOSIS — M62.9 HAMSTRING TIGHTNESS OF BOTH LOWER EXTREMITIES: ICD-10-CM

## 2020-11-04 DIAGNOSIS — M54.16 LUMBAR RADICULOPATHY, CHRONIC: Primary | ICD-10-CM

## 2020-11-04 DIAGNOSIS — M17.10 PATELLOFEMORAL ARTHRITIS: ICD-10-CM

## 2020-11-04 DIAGNOSIS — G47.9 SLEEP DISTURBANCE: ICD-10-CM

## 2020-11-04 DIAGNOSIS — M17.0 BILATERAL PRIMARY OSTEOARTHRITIS OF KNEE: ICD-10-CM

## 2020-11-04 PROCEDURE — 99214 OFFICE O/P EST MOD 30 MIN: CPT | Performed by: PHYSICAL MEDICINE & REHABILITATION

## 2020-11-04 NOTE — PROGRESS NOTES
[address]  FOLLOW UP EVALUATION    Consultation as a request of Dr. Manuel Caban    Chief Complaint: Knee pain    HISTORY OF PRESENT ILLNESS:   Patient presents with:  Numbness: pt here for follow up with c/o left knee pain. Patient denies any recent injury or trauma. Her pain is been ongoing for the last several years with recent exacerbation over the last several weeks. Her pain in the left knee is worse compared to the right knee.   Pain in the left knee is rat LEFT      march 2018   • REDUCTION OF LARGE BREAST Bilateral 03/28/2018   • REDUCTION RIGHT      march 2018         CURRENT MEDICATIONS:     Current Outpatient Medications   Medication Sig Dispense Refill   • Montelukast Sodium 10 MG Oral Tab Take 1 tablet sided due to pain  Posture: No scoliosis or kyphosis    Musculoskeletal/Neurological Exam:    LUMBAR SPINE:  Inspection: no erythema, swelling, or obvious deformity.     Palpation: no ttp over spinous process, paraspinal muscles, SI joints, prirformis muscl TP 8.0 05/22/2020    ALB 3.3 (L) 05/22/2020    GLOBULIN 4.7 (H) 05/22/2020     05/22/2020    K 4.1 05/22/2020     05/22/2020    CO2 29.0 05/22/2020     No results found for: PTP, PT, INR  Lab Results   Component Value Date    VITD 20.9 06/12

## 2020-11-04 NOTE — PATIENT INSTRUCTIONS
-MRI of the lumbar spine and follow up after  -Start physical therapy for the knees  -Follow up after MRI

## 2020-11-06 ENCOUNTER — TELEPHONE (OUTPATIENT)
Dept: NEUROLOGY | Facility: CLINIC | Age: 45
End: 2020-11-06

## 2020-11-06 NOTE — TELEPHONE ENCOUNTER
Sending P clinical notes for authorization of 8 PT sessions.  CPT Codes: [id number], [id number]- Pending approval

## 2020-12-23 ENCOUNTER — LAB ENCOUNTER (OUTPATIENT)
Dept: LAB | Age: 45
End: 2020-12-23
Attending: FAMILY MEDICINE
Payer: COMMERCIAL

## 2020-12-23 ENCOUNTER — TELEMEDICINE (OUTPATIENT)
Dept: FAMILY MEDICINE CLINIC | Facility: CLINIC | Age: 45
End: 2020-12-23

## 2020-12-23 ENCOUNTER — TELEPHONE (OUTPATIENT)
Dept: OBGYN CLINIC | Facility: CLINIC | Age: 45
End: 2020-12-23

## 2020-12-23 VITALS — HEIGHT: 66 IN | BODY MASS INDEX: 32.62 KG/M2 | WEIGHT: 203 LBS

## 2020-12-23 DIAGNOSIS — Z20.822 EXPOSURE TO COVID-19 VIRUS: Primary | ICD-10-CM

## 2020-12-23 DIAGNOSIS — U07.1 COVID-19: ICD-10-CM

## 2020-12-23 DIAGNOSIS — U07.1 COVID-19: Primary | ICD-10-CM

## 2020-12-23 PROCEDURE — 99213 OFFICE O/P EST LOW 20 MIN: CPT | Performed by: FAMILY MEDICINE

## 2020-12-23 PROCEDURE — 3008F BODY MASS INDEX DOCD: CPT | Performed by: FAMILY MEDICINE

## 2020-12-23 NOTE — PROGRESS NOTES
Patient presents with:  Cough: headaches      HPI:   Héctor Richard is a 39year old female who presents for concerns for covid-19. Has been coughing and having headaches for a while now.    Recently had a  on 20, and was made aware that [id number]    BANDING INTERAL HEMORRHOID   • REDUCTION LEFT      march 2018   • REDUCTION OF LARGE BREAST Bilateral 03/28/2018   • REDUCTION RIGHT      march 2018      Family History   Problem Relation Age of Onset   • Diabetes Mother    • Breast Cancer Mate in good [place] to provide continuity of care in the best interest of the provider-patient relationship, due to the ongoing public health crisis/national emergency and because of restrictions of visitation.   There are limitations of this visit as no physical

## 2020-12-23 NOTE — TELEPHONE ENCOUNTER
Pt took daughter to ED d/t COVID today and exposed on Wednesday at [place], coughing, severe headache,  Per pt while sleeping wakes up coughing at which time has SOB, and abd pain.     Denies fever, chills, joint pain/muscle pain, chest pain and loss of tas

## 2021-01-11 ENCOUNTER — OFFICE VISIT (OUTPATIENT)
Dept: FAMILY MEDICINE CLINIC | Facility: CLINIC | Age: 46
End: 2021-01-11

## 2021-01-11 ENCOUNTER — EKG ENCOUNTER (OUTPATIENT)
Dept: LAB | Age: 46
End: 2021-01-11
Attending: FAMILY MEDICINE
Payer: COMMERCIAL

## 2021-01-11 ENCOUNTER — LABORATORY ENCOUNTER (OUTPATIENT)
Dept: LAB | Facility: REFERENCE LAB | Age: 46
End: 2021-01-11
Attending: FAMILY MEDICINE
Payer: COMMERCIAL

## 2021-01-11 VITALS
SYSTOLIC BLOOD PRESSURE: 122 MMHG | BODY MASS INDEX: 32.78 KG/M2 | WEIGHT: 204 LBS | OXYGEN SATURATION: 100 % | HEIGHT: 66 IN | DIASTOLIC BLOOD PRESSURE: 72 MMHG | HEART RATE: 81 BPM

## 2021-01-11 DIAGNOSIS — R42 POSTURAL DIZZINESS WITH PRESYNCOPE: ICD-10-CM

## 2021-01-11 DIAGNOSIS — R55 POSTURAL DIZZINESS WITH PRESYNCOPE: ICD-10-CM

## 2021-01-11 DIAGNOSIS — R55 POSTURAL DIZZINESS WITH PRESYNCOPE: Primary | ICD-10-CM

## 2021-01-11 DIAGNOSIS — R42 POSTURAL DIZZINESS WITH PRESYNCOPE: Primary | ICD-10-CM

## 2021-01-11 DIAGNOSIS — R19.8 UMBILICUS DISCHARGE: ICD-10-CM

## 2021-01-11 DIAGNOSIS — R05.3 CHRONIC COUGH: ICD-10-CM

## 2021-01-11 LAB
ANION GAP SERPL CALC-SCNC: 3 MMOL/L (ref 0–18)
BASOPHILS # BLD AUTO: 0.03 X10(3) UL (ref 0–0.2)
BASOPHILS NFR BLD AUTO: 0.6 %
BUN BLD-MCNC: 11 MG/DL (ref 7–18)
BUN/CREAT SERPL: 11.3 (ref 10–20)
CALCIUM BLD-MCNC: 9.9 MG/DL (ref 8.5–10.1)
CHLORIDE SERPL-SCNC: 108 MMOL/L (ref 98–112)
CO2 SERPL-SCNC: 29 MMOL/L (ref 21–32)
CREAT BLD-MCNC: 0.97 MG/DL
DEPRECATED RDW RBC AUTO: 40.3 FL (ref 35.1–46.3)
EOSINOPHIL # BLD AUTO: 0.28 X10(3) UL (ref 0–0.7)
EOSINOPHIL NFR BLD AUTO: 5.2 %
ERYTHROCYTE [DISTWIDTH] IN BLOOD BY AUTOMATED COUNT: 12.5 % (ref 11–15)
EST. AVERAGE GLUCOSE BLD GHB EST-MCNC: 117 MG/DL (ref 68–126)
GLUCOSE BLD-MCNC: 87 MG/DL (ref 70–99)
HBA1C MFR BLD HPLC: 5.7 % (ref ?–5.7)
HCT VFR BLD AUTO: 39.9 %
HGB BLD-MCNC: 12.8 G/DL
IMM GRANULOCYTES # BLD AUTO: 0.01 X10(3) UL (ref 0–1)
IMM GRANULOCYTES NFR BLD: 0.2 %
LYMPHOCYTES # BLD AUTO: 1.93 X10(3) UL (ref 1–4)
LYMPHOCYTES NFR BLD AUTO: 35.5 %
MCH RBC QN AUTO: 28.2 PG (ref 26–34)
MCHC RBC AUTO-ENTMCNC: 32.1 G/DL (ref 31–37)
MCV RBC AUTO: 87.9 FL
MONOCYTES # BLD AUTO: 0.41 X10(3) UL (ref 0.1–1)
MONOCYTES NFR BLD AUTO: 7.6 %
NEUTROPHILS # BLD AUTO: 2.77 X10 (3) UL (ref 1.5–7.7)
NEUTROPHILS # BLD AUTO: 2.77 X10(3) UL (ref 1.5–7.7)
NEUTROPHILS NFR BLD AUTO: 50.9 %
OSMOLALITY SERPL CALC.SUM OF ELEC: 289 MOSM/KG (ref 275–295)
PATIENT FASTING Y/N/NP: NO
PLATELET # BLD AUTO: 291 10(3)UL (ref 150–450)
POTASSIUM SERPL-SCNC: 3.9 MMOL/L (ref 3.5–5.1)
RBC # BLD AUTO: 4.54 X10(6)UL
SODIUM SERPL-SCNC: 140 MMOL/L (ref 136–145)
TSI SER-ACNC: 0.69 MIU/ML (ref 0.36–3.74)
WBC # BLD AUTO: 5.4 X10(3) UL (ref 4–11)

## 2021-01-11 PROCEDURE — 84443 ASSAY THYROID STIM HORMONE: CPT

## 2021-01-11 PROCEDURE — 3074F SYST BP LT 130 MM HG: CPT | Performed by: FAMILY MEDICINE

## 2021-01-11 PROCEDURE — 93010 ELECTROCARDIOGRAM REPORT: CPT | Performed by: FAMILY MEDICINE

## 2021-01-11 PROCEDURE — 83036 HEMOGLOBIN GLYCOSYLATED A1C: CPT

## 2021-01-11 PROCEDURE — 36415 COLL VENOUS BLD VENIPUNCTURE: CPT

## 2021-01-11 PROCEDURE — 80048 BASIC METABOLIC PNL TOTAL CA: CPT

## 2021-01-11 PROCEDURE — 3078F DIAST BP <80 MM HG: CPT | Performed by: FAMILY MEDICINE

## 2021-01-11 PROCEDURE — 85025 COMPLETE CBC W/AUTO DIFF WBC: CPT

## 2021-01-11 PROCEDURE — 3008F BODY MASS INDEX DOCD: CPT | Performed by: FAMILY MEDICINE

## 2021-01-11 PROCEDURE — 99214 OFFICE O/P EST MOD 30 MIN: CPT | Performed by: FAMILY MEDICINE

## 2021-01-11 PROCEDURE — 93005 ELECTROCARDIOGRAM TRACING: CPT

## 2021-01-11 RX ORDER — MONTELUKAST SODIUM 10 MG/1
10 TABLET ORAL NIGHTLY
Qty: 90 TABLET | Refills: 0 | Status: SHIPPED | OUTPATIENT
Start: 2021-01-11

## 2021-01-11 RX ORDER — AZELASTINE 1 MG/ML
2 SPRAY, METERED NASAL DAILY
Qty: 30 ML | Refills: 1 | Status: SHIPPED | OUTPATIENT
Start: 2021-01-11

## 2021-01-11 NOTE — PROGRESS NOTES
CC:  Patient presents with:  Abdominal Pain  Syncope: one time in December  Headache      HPI: 39year old female here to discuss abdominal pain, pre-syncopal episode, and headaches.   Has had recurrent sinus infections for years and also having a lot of ea palpitations   Pulmonary:  Dry cough with exertion, no wheezing, no shortness of breath   GI:  No N/V/D, intermittent abdominal tightness with drainage from umbilicus   Dermatologic:  No rashes, intermittent drainage from umbilicus   Neuro: intermittent ve Forced sexual activity: Not on file    Other Topics      Concerns:        Caffeine Concern: Yes          1 cup coffee 3 times per week. 1 cup tea daily.   Soda        Exercise: Yes          daily        Seat Belt: Not Asked        Special Diet: Not Asked lesions  Neuro: CN II-XII intact, 5/5 muscle strength bilateral upper and lower extremities, normal cerebellar testing, no focal neurologic deficits       Assessment and Plan: 39year old female here for evaluation of presyncopal episodes, chronic cough wi

## 2021-01-11 NOTE — PATIENT INSTRUCTIONS
Step-by-Step  Using Nasal Spray    Try not to sneeze or blow your nose right after you use the spray. Store the bottle away from sunlight. Daisy last reviewed this educational content on 11/1/2017  © 3351-5820 [employer] [address].  [address] conditions. Other causes  Other causes include:  · Medicines. Certain medicines can cause dizziness and even fainting. In some cases, stopping a medicine too quickly can lead to withdrawal symptoms, including dizziness and fainting. · Anxiety.  Being anx

## 2021-02-16 ENCOUNTER — PATIENT MESSAGE (OUTPATIENT)
Dept: FAMILY MEDICINE CLINIC | Facility: CLINIC | Age: 46
End: 2021-02-16

## 2021-02-16 NOTE — TELEPHONE ENCOUNTER
Chilton Castleman, DO  Emm[facility] 10 Dr. Tonie Strickland 6 minutes ago (3:05 PM)     Referral I placed 10/2020 should still be valid but please contact Dr. Tellez General office to confirm or have patient reach out to office.     Message text      Called Dr. Tellez General office and rev

## 2021-02-16 NOTE — TELEPHONE ENCOUNTER
Called pt and did see DR. Coleman x 12 for physical therapy and needs another referral.          From: Ryan Roberson  To:  Sabina Naranjo DO  Sent: 2/16/2021  6:20 AM CST  Subject: Referral Adriana Rivas, Dr. Manuel Caban if it’s possible can send a referral to

## 2021-02-18 ENCOUNTER — OFFICE VISIT (OUTPATIENT)
Dept: NEUROLOGY | Facility: CLINIC | Age: 46
End: 2021-02-18

## 2021-02-18 VITALS
HEART RATE: 69 BPM | BODY MASS INDEX: 32.62 KG/M2 | DIASTOLIC BLOOD PRESSURE: 82 MMHG | RESPIRATION RATE: 100 BRPM | WEIGHT: 203 LBS | HEIGHT: 66 IN | SYSTOLIC BLOOD PRESSURE: 114 MMHG

## 2021-02-18 DIAGNOSIS — M17.0 BILATERAL PRIMARY OSTEOARTHRITIS OF KNEE: ICD-10-CM

## 2021-02-18 DIAGNOSIS — M62.9 HAMSTRING TIGHTNESS OF BOTH LOWER EXTREMITIES: ICD-10-CM

## 2021-02-18 DIAGNOSIS — M54.16 LUMBAR RADICULOPATHY, CHRONIC: Primary | ICD-10-CM

## 2021-02-18 PROCEDURE — 99214 OFFICE O/P EST MOD 30 MIN: CPT | Performed by: PHYSICAL MEDICINE & REHABILITATION

## 2021-02-18 PROCEDURE — 3079F DIAST BP 80-89 MM HG: CPT | Performed by: PHYSICAL MEDICINE & REHABILITATION

## 2021-02-18 PROCEDURE — 3074F SYST BP LT 130 MM HG: CPT | Performed by: PHYSICAL MEDICINE & REHABILITATION

## 2021-02-18 PROCEDURE — 3008F BODY MASS INDEX DOCD: CPT | Performed by: PHYSICAL MEDICINE & REHABILITATION

## 2021-02-18 RX ORDER — AMITRIPTYLINE HYDROCHLORIDE 10 MG/1
10 TABLET, FILM COATED ORAL NIGHTLY
Qty: 30 TABLET | Refills: 0 | Status: SHIPPED | OUTPATIENT
Start: 2021-02-18 | End: 2021-08-24 | Stop reason: ALTCHOICE

## 2021-02-19 ENCOUNTER — MED REC SCAN ONLY (OUTPATIENT)
Dept: NEUROLOGY | Facility: CLINIC | Age: 46
End: 2021-02-19

## 2021-02-19 NOTE — PROGRESS NOTES
[address]  FOLLOW UP EVALUATION      Chief Complaint: Bilateral leg numbness    HISTORY OF PRESENT ILLNESS:   Patient presents with:  Low Back Pain: LOV 11/04/2020 f/u for low back pain 5/10 w/ numbness in well.  She has been taking Advil as needed. She had x-ray of the knees during last office visit which is noted below. She has not had any MRI imaging of her lumbar spine.   She does endorse chronic low back pain as well but denies any radiating symptoms t History:   Procedure Laterality Date   • ABDOMINOPLASTY  2018   • APPENDECTOMY     •   2011    [facility]CH   • D & C  2016    with hysteroscopy   • ENDOMETRIAL ABLATION  2016   • HYSTEROSCOPY DILATION AND CURETTAGE N/A 2016 Since last Visit: denies  Tingling/Numbness: admits       PHYSICAL EXAM:   /82   Pulse 69   Resp (!) 100   Ht 66\"   Wt 203 lb (92.1 kg)   BMI 32.77 kg/m²   General: No immediate distress  Head: Normocephalic/ Atraumatic  Eyes: Extra-occular movement positive for ITB tightness      LABS:     Lab Results   Component Value Date     01/11/2021    A1C 5.7 (H) 01/11/2021     Lab Results   Component Value Date    WBC 5.4 01/11/2021    RBC 4.54 01/11/2021    HGB 12.8 01/11/2021    HCT 39.9 01/11/2021 pain and with her sleep complaints. The patient verbalized understanding with the plan and was in agreement. All questions/concerns were addressed and there were no barriers to learning. Evie Walter [facility]  Physical Medicine and Rehab

## 2021-03-10 ENCOUNTER — HOSPITAL ENCOUNTER (OUTPATIENT)
Dept: MRI IMAGING | Facility: HOSPITAL | Age: 46
Discharge: HOME | End: 2021-03-10
Attending: PHYSICAL MEDICINE & REHABILITATION
Payer: COMMERCIAL

## 2021-03-10 DIAGNOSIS — M54.16 LUMBAR RADICULOPATHY, CHRONIC: ICD-10-CM

## 2021-03-10 PROCEDURE — 72148 MRI LUMBAR SPINE W/O DYE: CPT | Performed by: PHYSICAL MEDICINE & REHABILITATION

## 2021-03-11 ENCOUNTER — TELEPHONE (OUTPATIENT)
Dept: NEUROLOGY | Facility: CLINIC | Age: 46
End: 2021-03-11

## 2021-03-11 NOTE — TELEPHONE ENCOUNTER
Patient has been notified of message below.  Patient verbalized understanding and has virtual appointment scheduled for 03/12/21

## 2021-03-11 NOTE — TELEPHONE ENCOUNTER
----- Message from Mihir Quick DO sent at 3/10/2021 11:21 AM CST -----  MRI shows small disc problem. Please have her folllow up as discussed.  Thanks

## 2021-03-12 ENCOUNTER — VIRTUAL PHONE E/M (OUTPATIENT)
Dept: NEUROLOGY | Facility: CLINIC | Age: 46
End: 2021-03-12

## 2021-03-12 DIAGNOSIS — Z23 NEED FOR VACCINATION: ICD-10-CM

## 2021-03-12 DIAGNOSIS — M54.16 LUMBAR RADICULOPATHY, CHRONIC: Primary | ICD-10-CM

## 2021-03-12 DIAGNOSIS — M17.0 BILATERAL PRIMARY OSTEOARTHRITIS OF KNEE: ICD-10-CM

## 2021-03-12 DIAGNOSIS — M62.9 HAMSTRING TIGHTNESS OF BOTH LOWER EXTREMITIES: ICD-10-CM

## 2021-03-12 PROCEDURE — 99213 OFFICE O/P EST LOW 20 MIN: CPT | Performed by: PHYSICAL MEDICINE & REHABILITATION
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
## 2021-04-13 ENCOUNTER — HOSPITAL ENCOUNTER (OUTPATIENT)
Age: 46
Discharge: HOME | End: 2021-04-13
Payer: COMMERCIAL

## 2021-04-13 VITALS
HEART RATE: 83 BPM | RESPIRATION RATE: 18 BRPM | TEMPERATURE: 98 F | OXYGEN SATURATION: 99 % | SYSTOLIC BLOOD PRESSURE: 118 MMHG | DIASTOLIC BLOOD PRESSURE: 88 MMHG

## 2021-04-13 DIAGNOSIS — R09.81 SINUS CONGESTION: ICD-10-CM

## 2021-04-13 DIAGNOSIS — Z20.822 ENCOUNTER FOR SCREENING LABORATORY TESTING FOR COVID-19 VIRUS: ICD-10-CM

## 2021-04-13 DIAGNOSIS — J06.9 UPPER RESPIRATORY TRACT INFECTION, UNSPECIFIED TYPE: Primary | ICD-10-CM

## 2021-04-13 PROCEDURE — U0002 COVID-19 LAB TEST NON-CDC: HCPCS | Performed by: NURSE PRACTITIONER

## 2021-04-13 PROCEDURE — 99213 OFFICE O/P EST LOW 20 MIN: CPT | Performed by: NURSE PRACTITIONER

## 2021-04-13 RX ORDER — MONTELUKAST SODIUM 10 MG/1
10 TABLET ORAL NIGHTLY
Qty: 30 TABLET | Refills: 0 | Status: SHIPPED | OUTPATIENT
Start: 2021-04-13 | End: 2021-05-13

## 2021-04-13 NOTE — ED PROVIDER NOTES
Patient Seen in: [facility]      History   Patient presents with:  Sinus Problem  Testing    Stated Complaint: Sinus issues    HPI/Subjective:   HPI    This is a 82-year-old female presenting with sinus pressure congestion headache and dry c negative except as noted above.     Physical Exam     ED Triage Vitals [04/13/21 1023]   /88   Pulse 83   Resp 18   Temp 97.7 °F (36.5 °C)   Temp src Temporal   SpO2 99 %   O2 Device None (Room air)       Current:/88   Pulse 83   Temp 97.7 °F (3 instructions placed in discharge paperwork. Patient acknowledged understanding discharge instructions.                            Disposition and Plan     Clinical Impression:  Upper respiratory tract infection, unspecified type  (primary encounter diagnos

## 2021-04-13 NOTE — ED INITIAL ASSESSMENT (HPI)
Pt here for c/o sinus pressure, congestion,headache, dry cough at night when laying down and post nasal drip since Thursday.

## 2021-08-21 ENCOUNTER — HOSPITAL ENCOUNTER (OUTPATIENT)
Age: 46
Discharge: HOME | End: 2021-08-21
Payer: COMMERCIAL

## 2021-08-21 VITALS
SYSTOLIC BLOOD PRESSURE: 112 MMHG | RESPIRATION RATE: 19 BRPM | DIASTOLIC BLOOD PRESSURE: 88 MMHG | HEIGHT: 66 IN | BODY MASS INDEX: 33.43 KG/M2 | OXYGEN SATURATION: 100 % | TEMPERATURE: 98 F | WEIGHT: 208 LBS | HEART RATE: 60 BPM

## 2021-08-21 DIAGNOSIS — Z51.89 VISIT FOR WOUND CHECK: Primary | ICD-10-CM

## 2021-08-21 PROCEDURE — 99214 OFFICE O/P EST MOD 30 MIN: CPT | Performed by: EMERGENCY MEDICINE

## 2021-08-21 NOTE — ED INITIAL ASSESSMENT (HPI)
Per pt has had about one month of naval drainage, pt reports had abdominoplasty in 2018 and has had naval drainage and abscess in the past since procedure. Pt denies any fevers or chills.

## 2021-08-21 NOTE — ED PROVIDER NOTES
Patient Seen in: [facility]      History   Patient presents with:  Rash Skin Problem    Stated Complaint: NAVEL DRAINAGE    HPI/Subjective:   Patient is Ryan Moran is a 42year old female here for wound check to her navel.   Carrier, DRAINAGE  Other systems are as noted in HPI. Constitutional and vital signs reviewed. All other systems reviewed and negative except as noted above.     Physical Exam     ED Triage Vitals [08/21/21 1313]   /88   Pulse 57   Resp 21   Temp 97.6 °F Review/reassessment: I independently  reviewed available prior medical records for any recent pertinent discharge summaries/testing. This includes but not limited to OP/IP visits, radiology tests , clinical labs tests, EKG's, and medication.        I Update

## 2021-08-23 ENCOUNTER — TELEPHONE (OUTPATIENT)
Dept: FAMILY MEDICINE CLINIC | Facility: CLINIC | Age: 46
End: 2021-08-23

## 2021-08-23 NOTE — TELEPHONE ENCOUNTER
Called pt and went to IC for navel area check/discharge. C/o pressure, swelling at area, hard mass above navel, was informed to do MRI, CT scan to investigate further. Pt using ointment to area, but feels sores are getting worse.   Provided SDA for tomorr

## 2021-08-23 NOTE — TELEPHONE ENCOUNTER
Patient is calling because she went to [facility] and still not feeling well and wants to touch bases about that visit.

## 2021-08-24 ENCOUNTER — OFFICE VISIT (OUTPATIENT)
Dept: FAMILY MEDICINE CLINIC | Facility: CLINIC | Age: 46
End: 2021-08-24

## 2021-08-24 VITALS
WEIGHT: 210 LBS | OXYGEN SATURATION: 99 % | DIASTOLIC BLOOD PRESSURE: 70 MMHG | BODY MASS INDEX: 33.75 KG/M2 | HEIGHT: 66 IN | HEART RATE: 68 BPM | SYSTOLIC BLOOD PRESSURE: 116 MMHG

## 2021-08-24 DIAGNOSIS — R14.0 ABDOMINAL BLOATING: ICD-10-CM

## 2021-08-24 DIAGNOSIS — Q89.9 UMBILICAL ABNORMALITY: Primary | ICD-10-CM

## 2021-08-24 PROCEDURE — 3074F SYST BP LT 130 MM HG: CPT | Performed by: FAMILY MEDICINE

## 2021-08-24 PROCEDURE — 3008F BODY MASS INDEX DOCD: CPT | Performed by: FAMILY MEDICINE

## 2021-08-24 PROCEDURE — 99213 OFFICE O/P EST LOW 20 MIN: CPT | Performed by: FAMILY MEDICINE

## 2021-08-24 PROCEDURE — 3078F DIAST BP <80 MM HG: CPT | Performed by: FAMILY MEDICINE

## 2021-08-24 NOTE — PROGRESS NOTES
CC:  Patient presents with: Follow - Up: belly button issue was seen in IC      HPI: 42year old female here for IC follow-up due to umbilical infection.   Seen in the IC on 8/21 due to worsening umbilical drainage, but it has been present since abdominopl drinks      Drug use: No      Sexual activity: Yes        Partners: Male        Comment: ablation    Other Topics      Concerns:        Caffeine Concern: Yes          1 cup coffee 3 times per week. 1 cup tea daily.   Soda        Exercise: Yes          daily bacitracin 500 UNIT/GM External Ointment Apply 1 Application topically 2 (two) times daily for 10 days. 28.4 g 0   • Montelukast Sodium 10 MG Oral Tab Take 1 tablet (10 mg total) by mouth nightly.  90 tablet 0   • Azelastine HCl 0.1 % Nasal Solution 2 spray

## 2021-09-21 ENCOUNTER — TELEPHONE (OUTPATIENT)
Dept: SURGERY | Facility: CLINIC | Age: 46
End: 2021-09-21

## 2021-09-21 NOTE — TELEPHONE ENCOUNTER
Spoke to patient about the navel wound she was referred to Dr. Terry Nassar for. She currently states she was prescribed an abx ointment and the wound is stable. Declined to send a photo. Transferred to [facility] for scheduling.

## 2021-09-22 ENCOUNTER — PATIENT MESSAGE (OUTPATIENT)
Dept: FAMILY MEDICINE CLINIC | Facility: CLINIC | Age: 46
End: 2021-09-22

## 2021-09-24 NOTE — TELEPHONE ENCOUNTER
Spoke to patient and informed her that her [facility] paperwork is complete. Patient asked me to fax to number on form and leave copy for her to .

## 2021-10-14 ENCOUNTER — TELEPHONE (OUTPATIENT)
Dept: FAMILY MEDICINE CLINIC | Facility: CLINIC | Age: 46
End: 2021-10-14

## 2021-11-05 ENCOUNTER — OFFICE VISIT (OUTPATIENT)
Dept: SURGERY | Facility: CLINIC | Age: 46
End: 2021-11-05

## 2021-11-05 VITALS
WEIGHT: 205.81 LBS | HEIGHT: 66 IN | DIASTOLIC BLOOD PRESSURE: 76 MMHG | RESPIRATION RATE: 16 BRPM | HEART RATE: 61 BPM | SYSTOLIC BLOOD PRESSURE: 109 MMHG | BODY MASS INDEX: 33.07 KG/M2 | OXYGEN SATURATION: 98 %

## 2021-11-05 DIAGNOSIS — L02.211 ABDOMINAL WALL ABSCESS: Primary | ICD-10-CM

## 2021-11-05 PROCEDURE — 3008F BODY MASS INDEX DOCD: CPT | Performed by: SURGERY

## 2021-11-05 PROCEDURE — 3074F SYST BP LT 130 MM HG: CPT | Performed by: SURGERY

## 2021-11-05 PROCEDURE — 99242 OFF/OP CONSLTJ NEW/EST SF 20: CPT | Performed by: SURGERY

## 2021-11-05 PROCEDURE — 3078F DIAST BP <80 MM HG: CPT | Performed by: SURGERY

## 2021-11-05 NOTE — CONSULTS
New Patient Consultation    This is the first visit for this 42year old female referred for evaluation of umbilical wound. History of Present Illness:    The patient is a 42year old female referred by Dr. Jaleel Jasmine for evaluation of her chronic umbil 11/5/2021), Disp: 30 mL, Rfl: 1  Cetirizine HCl (ZYRTEC ALLERGY OR), Take by mouth daily. , Disp: , Rfl:     No current facility-administered medications on file prior to visit.         Allergies:    No Known Allergies      Family History:   Family History patient denies frequent urination, needing to get up at night to urinate, urinary hesitancy or retaining urine, painful urination, urinary incontinence, decreased urine stream, blood in the urine, or vaginal/penile discharge.     Skin:   The patient denies abdominoplasty. The right hemiabdomen is noted to have a vertical scar in the paramedian region with moderate scar hypertrophy.   Close inspection of the umbilicus shows a pinpoint wound at the base of the umbilicus which probes superiorly approximately 1

## 2021-11-12 ENCOUNTER — MED REC SCAN ONLY (OUTPATIENT)
Dept: FAMILY MEDICINE CLINIC | Facility: CLINIC | Age: 46
End: 2021-11-12

## 2021-11-17 ENCOUNTER — HOSPITAL ENCOUNTER (OUTPATIENT)
Dept: CT IMAGING | Facility: HOSPITAL | Age: 46
Discharge: HOME | End: 2021-11-17
Attending: SURGERY
Payer: COMMERCIAL

## 2021-11-17 DIAGNOSIS — L02.211 ABDOMINAL WALL ABSCESS: ICD-10-CM

## 2021-11-17 PROCEDURE — 82565 ASSAY OF CREATININE: CPT

## 2021-11-17 PROCEDURE — 74177 CT ABD & PELVIS W/CONTRAST: CPT | Performed by: SURGERY

## 2021-11-19 ENCOUNTER — TELEPHONE (OUTPATIENT)
Dept: SURGERY | Facility: CLINIC | Age: 46
End: 2021-11-19

## 2021-11-19 NOTE — TELEPHONE ENCOUNTER
Instructed PSR's to schedule a pre-op visit for patient.   Aubree, Cosmetic Coordinator,was instructed to submit for insurance approval for \"exploration of abdominal wound, 2 hours\"

## 2021-11-19 NOTE — TELEPHONE ENCOUNTER
Spoke to patient. CT scan findings were reviewed. We discussed proceeding with exploration of the abdominal wound and closure over a drain. The patient would like to have surgery in January.

## 2021-11-19 NOTE — TELEPHONE ENCOUNTER
Pt called, would like to discuss results of recent CT scan and asking for guidance if she needs to schedule an appt to review results.  I explained that I work with a different surgeon in the office but will forward the message to Dr. Dilshad Cuello team.

## 2022-01-06 ENCOUNTER — TELEPHONE (OUTPATIENT)
Dept: SURGERY | Facility: CLINIC | Age: 47
End: 2022-01-06

## 2022-01-06 NOTE — TELEPHONE ENCOUNTER
Spoke to patient who called to discuss her upcoming pre-op appointment next month. We discussed that her auth for surgery expires in March but can always be extended.  We did discuss the uncertainty in scheduling at the moment with the COVID surge and patie

## 2022-01-10 ENCOUNTER — TELEPHONE (OUTPATIENT)
Dept: FAMILY MEDICINE CLINIC | Facility: CLINIC | Age: 47
End: 2022-01-10

## 2022-01-10 LAB — AMB EXT COVID-19 RESULT: DETECTED

## 2022-01-10 NOTE — TELEPHONE ENCOUNTER
Pt states she thought she has an appointment scheduled to see Dr. David Holden today. States she made appointment online for today at 8:30am, but informed pt appointment was not made, perhaps all steps were not completed while scheduling online. Pt states she was coming in to be seen regarding hernia and constant pain. States she looked forward to seeing David Holden and obtaining a note for work indicating current condition. No available openings for Raymond today. Pt asking if a note can still be obtained?

## 2022-01-12 ENCOUNTER — TELEMEDICINE (OUTPATIENT)
Dept: FAMILY MEDICINE CLINIC | Facility: CLINIC | Age: 47
End: 2022-01-12
Payer: COMMERCIAL

## 2022-01-12 DIAGNOSIS — K43.9 VENTRAL HERNIA WITHOUT OBSTRUCTION OR GANGRENE: ICD-10-CM

## 2022-01-12 DIAGNOSIS — U07.1 TELEHEALTH ENCOUNTER FOR CONFIRMED COVID-19: Primary | ICD-10-CM

## 2022-01-12 PROCEDURE — 99214 OFFICE O/P EST MOD 30 MIN: CPT | Performed by: FAMILY MEDICINE

## 2022-01-12 RX ORDER — BENZONATATE 200 MG/1
200 CAPSULE ORAL 3 TIMES DAILY PRN
Qty: 30 CAPSULE | Refills: 0 | Status: SHIPPED | OUTPATIENT
Start: 2022-01-12

## 2022-01-12 NOTE — PATIENT INSTRUCTIONS
COVID-19 Guidelines - 2022      [place] [address] is committed to the safety and well-being of our patients, members, employees, and communities.  As the COVID-19 pandemic continues to evolve, [place] [address] is here to provide community members If you are awaiting test results or are confirmed positive for COVID-19, and your symptoms worsen at home with symptoms such as: extreme weakness, difficult breathing or unrelenting fevers greater than 100.4° F, you should contact your healthcare prov your primary care provider within two days of your discharge to arrange for a telehealth follow-up.  CDC does not recommend repeat testing after a positive test.    For Those in Close Contact with Someone with COVID-19  Anyone who has been in close contact [email or web address]  [email or web address]  [email or web address]/[email or web address]-services/diseases-and-conditions/dise

## 2022-01-12 NOTE — PROGRESS NOTES
CC:  Patient presents with: Follow - Up: covid +  Other: having lots of pain, needs note for work      HPI: 42year old female presenting for video visit to discuss Covid-19 infection and abdominal pain.   Developed symptoms on 1/10 of diarrhea and a worse Alcohol/week: 0.0 standard drinks      Drug use: No      Sexual activity: Yes        Partners: Male        Comment: ablation    Other Topics      Concerns:        Caffeine Concern: Yes          1 cup coffee 3 times per week. 1 cup tea daily.   Soda        E no dyspnea   Psych: normal mood and affect     Assessment and Plan: 42year old female presenting for video visit to discuss Covid-19 infection and work restrictions due to ventral hernia.      1. Telehealth encounter for confirmed COVID-19    - Note excusi

## 2022-02-04 ENCOUNTER — OFFICE VISIT (OUTPATIENT)
Dept: SURGERY | Facility: CLINIC | Age: 47
End: 2022-02-04
Payer: COMMERCIAL

## 2022-02-04 DIAGNOSIS — S30.1XXD ABDOMINAL WALL SEROMA, SUBSEQUENT ENCOUNTER: Primary | ICD-10-CM

## 2022-02-04 PROCEDURE — 99212 OFFICE O/P EST SF 10 MIN: CPT | Performed by: SURGERY

## 2022-02-04 NOTE — PROGRESS NOTES
Abdullahi Contreras is a 42year old female who presents today for a preoperative visit. She like to proceed with exploration of her abdominal wound. She reports persistent drainage from her umbilicus. She denies fevers or chills. Physical Examination:  There is a punctate wound at the superior umbilical border with expressible serous fluid. There is no purulence, malodor, or surrounding erythema. Assessment and Plan:  Nonhealing wound of umbilicus following abdominoplasty. We discussed exploration of the wound via the patient is existing periumbilical scar and possibly lower abdominal scar as well. The nature of the procedure was reviewed with the patient. We discussed the risk of surgery including but not limited to bleeding, infection, scarring, delayed wound healing, contour abnormalities, injury to intra-abdominal structures, and need for further surgery. We discussed the expected postoperative course including need for drains, activity limitation, and compression. Multiple questions were answered to patient satisfaction. No guarantees as to outcome were offered. The patient expresses understanding and wishes to proceed.

## 2022-02-15 ENCOUNTER — TELEPHONE (OUTPATIENT)
Dept: SURGERY | Facility: CLINIC | Age: 47
End: 2022-02-15

## 2022-02-15 NOTE — TELEPHONE ENCOUNTER
Patient was called and offered surgery Date 5/5/2022. Patient excepted. Pt reminded to complete medical clearance. Pt verbalized understanding.

## 2022-03-28 ENCOUNTER — TELEPHONE (OUTPATIENT)
Dept: SURGERY | Facility: CLINIC | Age: 47
End: 2022-03-28

## 2022-03-28 NOTE — TELEPHONE ENCOUNTER
Patient was called to move her surgery date for a cancer case. Patient was offered 6/16 at [facility] or 5/18 at [place] [address]. Patient asked to move her case to [place]. Patient was reminded to get her medical clearance.

## 2022-04-30 ENCOUNTER — OFFICE VISIT (OUTPATIENT)
Dept: FAMILY MEDICINE CLINIC | Facility: CLINIC | Age: 47
End: 2022-04-30
Payer: COMMERCIAL

## 2022-04-30 VITALS
OXYGEN SATURATION: 99 % | HEART RATE: 76 BPM | BODY MASS INDEX: 32.14 KG/M2 | DIASTOLIC BLOOD PRESSURE: 78 MMHG | WEIGHT: 200 LBS | SYSTOLIC BLOOD PRESSURE: 118 MMHG | HEIGHT: 66 IN

## 2022-04-30 DIAGNOSIS — Z00.00 ROUTINE GENERAL MEDICAL EXAMINATION AT A HEALTH CARE FACILITY: ICD-10-CM

## 2022-04-30 DIAGNOSIS — Z12.31 SCREENING MAMMOGRAM FOR BREAST CANCER: ICD-10-CM

## 2022-04-30 DIAGNOSIS — Z01.818 PREOP EXAMINATION: Primary | ICD-10-CM

## 2022-04-30 DIAGNOSIS — J30.89 NON-SEASONAL ALLERGIC RHINITIS, UNSPECIFIED TRIGGER: ICD-10-CM

## 2022-04-30 DIAGNOSIS — S30.1XXD ABDOMINAL WALL SEROMA, SUBSEQUENT ENCOUNTER: ICD-10-CM

## 2022-04-30 PROBLEM — M72.6 NECROTIZING FASCIITIS (HCC): Status: RESOLVED | Noted: 2018-06-28 | Resolved: 2022-04-30

## 2022-04-30 PROCEDURE — 3078F DIAST BP <80 MM HG: CPT | Performed by: FAMILY MEDICINE

## 2022-04-30 PROCEDURE — 3008F BODY MASS INDEX DOCD: CPT | Performed by: FAMILY MEDICINE

## 2022-04-30 PROCEDURE — 3074F SYST BP LT 130 MM HG: CPT | Performed by: FAMILY MEDICINE

## 2022-04-30 PROCEDURE — 99214 OFFICE O/P EST MOD 30 MIN: CPT | Performed by: FAMILY MEDICINE

## 2022-05-02 ENCOUNTER — LAB ENCOUNTER (OUTPATIENT)
Dept: LAB | Facility: REFERENCE LAB | Age: 47
End: 2022-05-02
Attending: FAMILY MEDICINE
Payer: COMMERCIAL

## 2022-05-02 DIAGNOSIS — Z00.00 ROUTINE GENERAL MEDICAL EXAMINATION AT A HEALTH CARE FACILITY: ICD-10-CM

## 2022-05-02 DIAGNOSIS — Z01.818 PREOP EXAMINATION: ICD-10-CM

## 2022-05-02 LAB
ALBUMIN SERPL-MCNC: 3.2 G/DL (ref 3.4–5)
ALBUMIN/GLOB SERPL: 0.8 {RATIO} (ref 1–2)
ALP LIVER SERPL-CCNC: 81 U/L
ALT SERPL-CCNC: 14 U/L
ANION GAP SERPL CALC-SCNC: 5 MMOL/L (ref 0–18)
AST SERPL-CCNC: 14 U/L (ref 15–37)
BASOPHILS # BLD AUTO: 0.05 X10(3) UL (ref 0–0.2)
BASOPHILS NFR BLD AUTO: 0.9 %
BILIRUB SERPL-MCNC: 0.4 MG/DL (ref 0.1–2)
BUN BLD-MCNC: 10 MG/DL (ref 7–18)
BUN/CREAT SERPL: 12.5 (ref 10–20)
CALCIUM BLD-MCNC: 9.2 MG/DL (ref 8.5–10.1)
CHLORIDE SERPL-SCNC: 106 MMOL/L (ref 98–112)
CHOLEST SERPL-MCNC: 208 MG/DL (ref ?–200)
CO2 SERPL-SCNC: 29 MMOL/L (ref 21–32)
CREAT BLD-MCNC: 0.8 MG/DL
DEPRECATED RDW RBC AUTO: 41.5 FL (ref 35.1–46.3)
EOSINOPHIL # BLD AUTO: 0.27 X10(3) UL (ref 0–0.7)
EOSINOPHIL NFR BLD AUTO: 4.7 %
ERYTHROCYTE [DISTWIDTH] IN BLOOD BY AUTOMATED COUNT: 12.7 % (ref 11–15)
EST. AVERAGE GLUCOSE BLD GHB EST-MCNC: 114 MG/DL (ref 68–126)
FASTING PATIENT LIPID ANSWER: YES
FASTING STATUS PATIENT QL REPORTED: YES
GLOBULIN PLAS-MCNC: 4.2 G/DL (ref 2.8–4.4)
GLUCOSE BLD-MCNC: 96 MG/DL (ref 70–99)
HBA1C MFR BLD: 5.6 % (ref ?–5.7)
HCT VFR BLD AUTO: 39.9 %
HCV AB SERPL QL IA: NONREACTIVE
HDLC SERPL-MCNC: 61 MG/DL (ref 40–59)
HGB BLD-MCNC: 12.6 G/DL
IMM GRANULOCYTES # BLD AUTO: 0.02 X10(3) UL (ref 0–1)
IMM GRANULOCYTES NFR BLD: 0.3 %
LDLC SERPL CALC-MCNC: 141 MG/DL (ref ?–100)
LYMPHOCYTES # BLD AUTO: 2.41 X10(3) UL (ref 1–4)
LYMPHOCYTES NFR BLD AUTO: 42.1 %
MCH RBC QN AUTO: 28.1 PG (ref 26–34)
MCHC RBC AUTO-ENTMCNC: 31.6 G/DL (ref 31–37)
MCV RBC AUTO: 89.1 FL
MONOCYTES # BLD AUTO: 0.37 X10(3) UL (ref 0.1–1)
MONOCYTES NFR BLD AUTO: 6.5 %
NEUTROPHILS # BLD AUTO: 2.61 X10 (3) UL (ref 1.5–7.7)
NEUTROPHILS # BLD AUTO: 2.61 X10(3) UL (ref 1.5–7.7)
NEUTROPHILS NFR BLD AUTO: 45.5 %
NONHDLC SERPL-MCNC: 147 MG/DL (ref ?–130)
OSMOLALITY SERPL CALC.SUM OF ELEC: 289 MOSM/KG (ref 275–295)
PLATELET # BLD AUTO: 283 10(3)UL (ref 150–450)
POTASSIUM SERPL-SCNC: 4.1 MMOL/L (ref 3.5–5.1)
PROT SERPL-MCNC: 7.4 G/DL (ref 6.4–8.2)
RBC # BLD AUTO: 4.48 X10(6)UL
SODIUM SERPL-SCNC: 140 MMOL/L (ref 136–145)
TRIGL SERPL-MCNC: 35 MG/DL (ref 30–149)
VLDLC SERPL CALC-MCNC: 6 MG/DL (ref 0–30)
WBC # BLD AUTO: 5.7 X10(3) UL (ref 4–11)

## 2022-05-02 PROCEDURE — 80053 COMPREHEN METABOLIC PANEL: CPT

## 2022-05-02 PROCEDURE — 83036 HEMOGLOBIN GLYCOSYLATED A1C: CPT

## 2022-05-02 PROCEDURE — 36415 COLL VENOUS BLD VENIPUNCTURE: CPT

## 2022-05-02 PROCEDURE — 86803 HEPATITIS C AB TEST: CPT

## 2022-05-02 PROCEDURE — 85025 COMPLETE CBC W/AUTO DIFF WBC: CPT

## 2022-05-02 PROCEDURE — 80061 LIPID PANEL: CPT

## 2022-05-16 ENCOUNTER — LAB ENCOUNTER (OUTPATIENT)
Dept: LAB | Age: 47
End: 2022-05-16
Attending: SURGERY
Payer: COMMERCIAL

## 2022-05-16 DIAGNOSIS — Z01.812 ENCOUNTER FOR PREOPERATIVE SCREENING LABORATORY TESTING FOR COVID-19 VIRUS: ICD-10-CM

## 2022-05-16 DIAGNOSIS — Z20.822 ENCOUNTER FOR PREOPERATIVE SCREENING LABORATORY TESTING FOR COVID-19 VIRUS: ICD-10-CM

## 2022-05-16 LAB — SARS-COV-2 RNA RESP QL NAA+PROBE: NOT DETECTED

## 2022-05-17 ENCOUNTER — ANESTHESIA EVENT (OUTPATIENT)
Dept: SURGERY | Facility: HOSPITAL | Age: 47
End: 2022-05-17
Payer: COMMERCIAL

## 2022-05-18 ENCOUNTER — ANESTHESIA (OUTPATIENT)
Dept: SURGERY | Facility: HOSPITAL | Age: 47
End: 2022-05-18
Payer: COMMERCIAL

## 2022-05-18 ENCOUNTER — HOSPITAL ENCOUNTER (OUTPATIENT)
Facility: HOSPITAL | Age: 47
Setting detail: HOSPITAL OUTPATIENT SURGERY
Discharge: HOME | End: 2022-05-18
Attending: SURGERY | Admitting: SURGERY
Payer: COMMERCIAL

## 2022-05-18 VITALS
SYSTOLIC BLOOD PRESSURE: 110 MMHG | OXYGEN SATURATION: 97 % | HEART RATE: 56 BPM | RESPIRATION RATE: 18 BRPM | TEMPERATURE: 97 F | HEIGHT: 66 IN | BODY MASS INDEX: 31.89 KG/M2 | DIASTOLIC BLOOD PRESSURE: 77 MMHG | WEIGHT: 198.44 LBS

## 2022-05-18 DIAGNOSIS — Z01.812 ENCOUNTER FOR PREOPERATIVE SCREENING LABORATORY TESTING FOR COVID-19 VIRUS: Primary | ICD-10-CM

## 2022-05-18 DIAGNOSIS — Z20.822 ENCOUNTER FOR PREOPERATIVE SCREENING LABORATORY TESTING FOR COVID-19 VIRUS: Primary | ICD-10-CM

## 2022-05-18 DIAGNOSIS — S30.1XXD ABDOMINAL WALL SEROMA, SUBSEQUENT ENCOUNTER: ICD-10-CM

## 2022-05-18 LAB — B-HCG UR QL: NEGATIVE

## 2022-05-18 PROCEDURE — 87102 FUNGUS ISOLATION CULTURE: CPT | Performed by: SURGERY

## 2022-05-18 PROCEDURE — 87070 CULTURE OTHR SPECIMN AEROBIC: CPT | Performed by: SURGERY

## 2022-05-18 PROCEDURE — 87205 SMEAR GRAM STAIN: CPT | Performed by: SURGERY

## 2022-05-18 PROCEDURE — 88305 TISSUE EXAM BY PATHOLOGIST: CPT | Performed by: SURGERY

## 2022-05-18 PROCEDURE — 87206 SMEAR FLUORESCENT/ACID STAI: CPT | Performed by: SURGERY

## 2022-05-18 PROCEDURE — 87075 CULTR BACTERIA EXCEPT BLOOD: CPT | Performed by: SURGERY

## 2022-05-18 PROCEDURE — 81025 URINE PREGNANCY TEST: CPT

## 2022-05-18 PROCEDURE — 87176 TISSUE HOMOGENIZATION CULTR: CPT | Performed by: SURGERY

## 2022-05-18 PROCEDURE — 0HB7XZZ EXCISION OF ABDOMEN SKIN, EXTERNAL APPROACH: ICD-10-PCS | Performed by: SURGERY

## 2022-05-18 RX ORDER — LIDOCAINE HYDROCHLORIDE 10 MG/ML
INJECTION, SOLUTION EPIDURAL; INFILTRATION; INTRACAUDAL; PERINEURAL AS NEEDED
Status: DISCONTINUED | OUTPATIENT
Start: 2022-05-18 | End: 2022-05-18 | Stop reason: SURG

## 2022-05-18 RX ORDER — HYDROCODONE BITARTRATE AND ACETAMINOPHEN 5; 325 MG/1; MG/1
1 TABLET ORAL ONCE AS NEEDED
Status: DISCONTINUED | OUTPATIENT
Start: 2022-05-18 | End: 2022-05-18

## 2022-05-18 RX ORDER — PROCHLORPERAZINE EDISYLATE 5 MG/ML
5 INJECTION INTRAMUSCULAR; INTRAVENOUS EVERY 8 HOURS PRN
Status: DISCONTINUED | OUTPATIENT
Start: 2022-05-18 | End: 2022-05-18

## 2022-05-18 RX ORDER — KETOROLAC TROMETHAMINE 30 MG/ML
INJECTION, SOLUTION INTRAMUSCULAR; INTRAVENOUS AS NEEDED
Status: DISCONTINUED | OUTPATIENT
Start: 2022-05-18 | End: 2022-05-18 | Stop reason: SURG

## 2022-05-18 RX ORDER — ONDANSETRON 2 MG/ML
INJECTION INTRAMUSCULAR; INTRAVENOUS AS NEEDED
Status: DISCONTINUED | OUTPATIENT
Start: 2022-05-18 | End: 2022-05-18 | Stop reason: SURG

## 2022-05-18 RX ORDER — ACETAMINOPHEN 500 MG
1000 TABLET ORAL ONCE AS NEEDED
Status: DISCONTINUED | OUTPATIENT
Start: 2022-05-18 | End: 2022-05-18

## 2022-05-18 RX ORDER — LIDOCAINE HYDROCHLORIDE AND EPINEPHRINE 10; 10 MG/ML; UG/ML
INJECTION, SOLUTION INFILTRATION; PERINEURAL AS NEEDED
Status: DISCONTINUED | OUTPATIENT
Start: 2022-05-18 | End: 2022-05-18

## 2022-05-18 RX ORDER — SCOLOPAMINE TRANSDERMAL SYSTEM 1 MG/1
1 PATCH, EXTENDED RELEASE TRANSDERMAL ONCE
Status: DISCONTINUED | OUTPATIENT
Start: 2022-05-18 | End: 2022-05-18

## 2022-05-18 RX ORDER — CEFAZOLIN SODIUM/WATER 2 G/20 ML
2 SYRINGE (ML) INTRAVENOUS ONCE
Status: COMPLETED | OUTPATIENT
Start: 2022-05-18 | End: 2022-05-18

## 2022-05-18 RX ORDER — PHENYLEPHRINE HCL 10 MG/ML
VIAL (ML) INJECTION AS NEEDED
Status: DISCONTINUED | OUTPATIENT
Start: 2022-05-18 | End: 2022-05-18 | Stop reason: SURG

## 2022-05-18 RX ORDER — DEXAMETHASONE SODIUM PHOSPHATE 4 MG/ML
VIAL (ML) INJECTION AS NEEDED
Status: DISCONTINUED | OUTPATIENT
Start: 2022-05-18 | End: 2022-05-18 | Stop reason: SURG

## 2022-05-18 RX ORDER — SODIUM CHLORIDE, SODIUM LACTATE, POTASSIUM CHLORIDE, CALCIUM CHLORIDE 600; 310; 30; 20 MG/100ML; MG/100ML; MG/100ML; MG/100ML
INJECTION, SOLUTION INTRAVENOUS CONTINUOUS
Status: DISCONTINUED | OUTPATIENT
Start: 2022-05-18 | End: 2022-05-18

## 2022-05-18 RX ORDER — CEPHALEXIN 500 MG/1
500 CAPSULE ORAL 4 TIMES DAILY
Qty: 40 CAPSULE | Refills: 0 | Status: SHIPPED | OUTPATIENT
Start: 2022-05-18

## 2022-05-18 RX ORDER — HEPARIN SODIUM 5000 [USP'U]/ML
5000 INJECTION, SOLUTION INTRAVENOUS; SUBCUTANEOUS ONCE
Status: COMPLETED | OUTPATIENT
Start: 2022-05-18 | End: 2022-05-18

## 2022-05-18 RX ORDER — HYDROMORPHONE HYDROCHLORIDE 1 MG/ML
INJECTION, SOLUTION INTRAMUSCULAR; INTRAVENOUS; SUBCUTANEOUS
Status: COMPLETED
Start: 2022-05-18 | End: 2022-05-18

## 2022-05-18 RX ORDER — HYDROMORPHONE HYDROCHLORIDE 1 MG/ML
0.6 INJECTION, SOLUTION INTRAMUSCULAR; INTRAVENOUS; SUBCUTANEOUS EVERY 5 MIN PRN
Status: DISCONTINUED | OUTPATIENT
Start: 2022-05-18 | End: 2022-05-18

## 2022-05-18 RX ORDER — NALOXONE HYDROCHLORIDE 0.4 MG/ML
80 INJECTION, SOLUTION INTRAMUSCULAR; INTRAVENOUS; SUBCUTANEOUS AS NEEDED
Status: DISCONTINUED | OUTPATIENT
Start: 2022-05-18 | End: 2022-05-18

## 2022-05-18 RX ORDER — HYDROCODONE BITARTRATE AND ACETAMINOPHEN 5; 325 MG/1; MG/1
2 TABLET ORAL ONCE AS NEEDED
Status: DISCONTINUED | OUTPATIENT
Start: 2022-05-18 | End: 2022-05-18

## 2022-05-18 RX ORDER — ACETAMINOPHEN 500 MG
1000 TABLET ORAL ONCE
Status: DISCONTINUED | OUTPATIENT
Start: 2022-05-18 | End: 2022-05-18 | Stop reason: HOSPADM

## 2022-05-18 RX ORDER — ONDANSETRON 4 MG/1
4 TABLET, FILM COATED ORAL EVERY 8 HOURS PRN
Qty: 20 TABLET | Refills: 0 | Status: SHIPPED | OUTPATIENT
Start: 2022-05-18

## 2022-05-18 RX ORDER — ONDANSETRON 2 MG/ML
4 INJECTION INTRAMUSCULAR; INTRAVENOUS EVERY 6 HOURS PRN
Status: DISCONTINUED | OUTPATIENT
Start: 2022-05-18 | End: 2022-05-18

## 2022-05-18 RX ORDER — CEPHALEXIN 500 MG/1
500 CAPSULE ORAL 4 TIMES DAILY
Qty: 20 CAPSULE | Refills: 0 | Status: SHIPPED | OUTPATIENT
Start: 2022-05-18 | End: 2022-05-23

## 2022-05-18 RX ORDER — HYDROMORPHONE HYDROCHLORIDE 1 MG/ML
0.4 INJECTION, SOLUTION INTRAMUSCULAR; INTRAVENOUS; SUBCUTANEOUS EVERY 5 MIN PRN
Status: DISCONTINUED | OUTPATIENT
Start: 2022-05-18 | End: 2022-05-18

## 2022-05-18 RX ORDER — HYDROCODONE BITARTRATE AND ACETAMINOPHEN 5; 325 MG/1; MG/1
1-2 TABLET ORAL EVERY 4 HOURS PRN
Qty: 20 TABLET | Refills: 0 | Status: SHIPPED | OUTPATIENT
Start: 2022-05-18

## 2022-05-18 RX ORDER — HYDROMORPHONE HYDROCHLORIDE 1 MG/ML
0.2 INJECTION, SOLUTION INTRAMUSCULAR; INTRAVENOUS; SUBCUTANEOUS EVERY 5 MIN PRN
Status: DISCONTINUED | OUTPATIENT
Start: 2022-05-18 | End: 2022-05-18

## 2022-05-18 RX ADMIN — KETOROLAC TROMETHAMINE 30 MG: 30 INJECTION, SOLUTION INTRAMUSCULAR; INTRAVENOUS at 11:49:00

## 2022-05-18 RX ADMIN — PHENYLEPHRINE HCL 80 MCG: 10 MG/ML VIAL (ML) INJECTION at 11:24:00

## 2022-05-18 RX ADMIN — DEXAMETHASONE SODIUM PHOSPHATE 8 MG: 4 MG/ML VIAL (ML) INJECTION at 10:57:00

## 2022-05-18 RX ADMIN — ONDANSETRON 4 MG: 2 INJECTION INTRAMUSCULAR; INTRAVENOUS at 11:49:00

## 2022-05-18 RX ADMIN — SODIUM CHLORIDE, SODIUM LACTATE, POTASSIUM CHLORIDE, CALCIUM CHLORIDE: 600; 310; 30; 20 INJECTION, SOLUTION INTRAVENOUS at 10:51:00

## 2022-05-18 RX ADMIN — CEFAZOLIN SODIUM/WATER 2 G: 2 G/20 ML SYRINGE (ML) INTRAVENOUS at 10:57:00

## 2022-05-18 RX ADMIN — LIDOCAINE HYDROCHLORIDE 50 MG: 10 INJECTION, SOLUTION EPIDURAL; INFILTRATION; INTRACAUDAL; PERINEURAL at 10:53:00

## 2022-05-18 NOTE — ANESTHESIA PROCEDURE NOTES
Airway  Date/Time: 5/18/2022 10:55 AM  Urgency: Elective      General Information and Staff    Patient location during procedure: OR  Anesthesiologist: Rickey Khan MD  Resident/CRNA: Romayne Grip, CRNA  Performed: CRNA     Indications and Patient Condition  Indications for airway management: anesthesia  Sedation level: deep  Preoxygenated: yes  Patient position: sniffing  Mask difficulty assessment: 1 - vent by mask    Final Airway Details  Final airway type: supraglottic airway      Successful airway: classic  Size 4      Number of attempts at approach: 1    Additional Comments  Atraumatic insertion, dentition and lips as preop

## 2022-05-18 NOTE — ANESTHESIA POSTPROCEDURE EVALUATION
[facility] Patient Status:  Hospital Outpatient Surgery   Age/Gender 41year old female MRN [id number]   [facility] SURGERY Attending Santos Gutierrez MD   Hosp Day # 0 PCP Luna Montejo DO       Anesthesia Post-op Note     Exploration of umbilical wound with scar revision. Procedure Summary     Date: 05/18/22 Room / Location: [facility] MAIN OR 19 / [facility] MAIN OR    Anesthesia Start: 1637 Anesthesia Stop: 3161    Procedure: Exploration of umbilical wound with scar revision. (N/A Abdomen) Diagnosis:       Abdominal wall seroma, subsequent encounter      (Abdominal wall seroma, subsequent encounter [S30.1XXD])    Surgeons: Santos Gutierrez MD Anesthesiologist: Korey Lino MD    Anesthesia Type: general ASA Status: 2          Anesthesia Type: general    Vitals Value Taken Time   /82 05/18/22 1224   Temp 98.3 05/18/22 1224   Pulse 79 05/18/22 1224   Resp 14 05/18/22 1224   SpO2 97 05/18/22 1224       Patient Location: PACU    Anesthesia Type: general    Airway Patency: patent    Postop Pain Control: adequate    Mental Status: mildly sedated but able to meaningfully participate in the post-anesthesia evaluation    Nausea/Vomiting: none    Cardiopulmonary/Hydration status: stable euvolemic    Complications: no apparent anesthesia related complications    Postop vital signs: stable    Dental Exam: Unchanged from Preop    Patient to be discharged from PACU when criteria met.

## 2022-05-18 NOTE — BRIEF OP NOTE
Pre-Operative Diagnosis: Abdominal wall seroma, subsequent encounter [S30.1XXD]     Post-Operative Diagnosis: Abdominal wall seroma, subsequent encounter [S30.1XXD]      Procedure Performed:    Exploration of umbilical wound with scar revision.     Surgeon(s) and Role:     * Gabby Mendoza MD - Primary    Assistant(s):  PA: José Luis Thomas     Surgical Findings: ad dictated     Specimen: umbilical sinus     Estimated Blood Loss: Blood Output: 5 mL (5/18/2022 12:03 PM)    George Chamorro PA-C  5/18/2022  12:24 PM

## 2022-05-18 NOTE — H&P
Dr. Yee Skinner 4/30/22 surgical clearance H&P reviewed. No interval changes. Informed consent obtained and she wishes to proceed as planned.

## 2022-05-18 NOTE — OPERATIVE REPORT
[address]    PATIENT'S NAME: Kat Zepeda   ATTENDING PHYSICIAN: Gerardo Turk M.D. OPERATING PHYSICIAN: Gerardo Turk M.D. PATIENT ACCOUNT#:   [de-identified]    LOCATION:  PREOPASC EH PRE ASCC 10 EDWP 10  MEDICAL RECORD #:   [id number]       YOB: 1975  ADMISSION DATE:       05/18/2022      OPERATION DATE:  05/18/2022    OPERATIVE REPORT      PREOPERATIVE DIAGNOSIS:  Umbilical wound. POSTOPERATIVE DIAGNOSIS:  Umbilical wound. PROCEDURE:  Exploration of umbilical wound, excision of umbilical sinus tract, and layered repair of umbilical wound totaling 7 cm. ASSISTANT:  Sukhjinder Alonzo PA-C. ANESTHESIA:  General with laryngeal mask airway. ESTIMATED BLOOD LOSS:  Trace. COMPLICATIONS:  None. INDICATIONS:  Patient is a 53-year-old female who underwent abdominoplasty in the interval past at an outside institution. This patient developed significant wound healing difficulties requiring debridement postoperatively. She has now largely healed, but has a persistent, intermittently draining umbilical sinus. Given the possibility of retained foreign body, we discussed surgical exploration. OPERATIVE TECHNIQUE:  Informed consent was obtained from the patient. The risks, benefits, and alternatives were reviewed with the patient preoperatively. She expressed understanding and wished to proceed. The patient was taken to the operating room, properly identified, placed in the supine position. Sequential compression devices were placed on bilateral lower extremities. Intravenous antibiotic prophylaxis was administered. The patient underwent successful induction of anesthesia and placement of laryngeal mask airway. The arms were placed abducted on foam-padded armboards and secured with Kerlix. The abdomen was prepped and draped sterilely. The procedure began with infiltration of 1% lidocaine with epinephrine at the margins of the periumbilical wound.   The sinus tract was then identified at the superior aspect of the umbilicus. It was infiltrated with methylene blue with an angiocatheter. Next, the margins of the umbilicus were incised with a #11 scalpel. The subcutaneous tissues were then bluntly . Attention turned to the superior umbilical border. With a sound within the sinus tract, the contents of the cavity were circumferentially dissected with iris scissors. The tract was then divided at the insertion with the umbilicus. Some inflammatory tissue was noted and this was all excised in toto. The dissection extended approximately 1.5 cm superiorly where a Prolene suture knot was noted. Some additional Prolene sutures were noted in the wound and these were all debrided. Once all the devitalized tissue had been debrided, the wounds were irrigated with Betadine and then saline irrigation. The umbilical wound was excised as a wedge excision and sent for permanent pathologic analysis. The wounds were then repaired in a layered fashion with interrupted 3-0 Vicryl deep sutures and interrupted 5-0 Prolene mattress sutures. The length of the closure totaled 7 cm. Bacitracin, Xeroform, gauze, and Tegaderm were applied. The patient tolerated the procedure well. No complications. Dictated By Dami Dos Santos M.D.  d: 05/18/2022 12:25:53  t: 05/18/2022 18:20:58  [facility] [id number]  YOBANI/

## 2022-05-27 ENCOUNTER — OFFICE VISIT (OUTPATIENT)
Dept: SURGERY | Facility: CLINIC | Age: 47
End: 2022-05-27
Payer: COMMERCIAL

## 2022-05-27 DIAGNOSIS — S31.105D OPEN WOUND OF UMBILICAL REGION, SUBSEQUENT ENCOUNTER: Primary | ICD-10-CM

## 2022-05-27 PROBLEM — S31.105A OPEN WOUND OF UMBILICAL REGION: Status: ACTIVE | Noted: 2022-05-27

## 2022-05-27 NOTE — PROGRESS NOTES
Jaclyn Perez is a 41year old female who presents today for a follow-up after exploration of umbilical wound wound, excision of umbilical sinus tract, and layered repair of umbilical wound on 2/29/3616 with Dr. Ryder Kimbrough. She denies fever and chills. She denies nausea, vomiting, diarrhea or constipation. Her pain is controlled. She is not taking narcotic pain medication. She did start wearing her own abdominal binder as this is more comfortable. Physical Exam     Abdomen: umbilical incision clean, dry and intact without wound drainage or wound separation. Prolene sutures in place. Umbilicus viable. There is no erythema. No evidence of hematoma/seroma. There were no vitals filed for this visit. Assessment and Plan     Jaclyn Perez is doing well s/p exploration of umbilical wound wound, excision of umbilical sinus tract, and layered repair of umbilical wound on 5/67/3177 with Dr. Ryder Kimbrough. Prolene sutures were removed. Neosporin and gauze was placed. She was instructed to change this daily. She can continue wearing her abdominal binder for support/comfort. She should continue with activity restrictions. She will follow up in 2 weeks with Dr. Ryder Kimbrough. She can discontinue her antibiotic as her cultures from surgery are negative. She was instructed to call with any questions or concerns. Questions were answered. Patient understands.      José Luis Paez  5/27/2022  9:54 AM

## 2022-06-17 ENCOUNTER — OFFICE VISIT (OUTPATIENT)
Dept: SURGERY | Facility: CLINIC | Age: 47
End: 2022-06-17
Payer: COMMERCIAL

## 2022-06-17 ENCOUNTER — HOSPITAL ENCOUNTER (OUTPATIENT)
Dept: MAMMOGRAPHY | Facility: HOSPITAL | Age: 47
Discharge: HOME | End: 2022-06-17
Attending: FAMILY MEDICINE
Payer: COMMERCIAL

## 2022-06-17 DIAGNOSIS — L02.211 ABDOMINAL WALL ABSCESS: Primary | ICD-10-CM

## 2022-06-17 DIAGNOSIS — Z12.31 SCREENING MAMMOGRAM FOR BREAST CANCER: ICD-10-CM

## 2022-06-17 PROCEDURE — 99024 POSTOP FOLLOW-UP VISIT: CPT | Performed by: SURGERY

## 2022-06-17 PROCEDURE — 77063 BREAST TOMOSYNTHESIS BI: CPT | Performed by: FAMILY MEDICINE

## 2022-06-17 PROCEDURE — 77067 SCR MAMMO BI INCL CAD: CPT | Performed by: FAMILY MEDICINE

## 2022-06-17 NOTE — PROGRESS NOTES
Aspen Dumont is a 41year old female who presents today for a follow-up after undergoing exploration of her umbilical wound approximately 1 month ago. She complains of moderate intermittent upper abdominal discomfort with straining. Physical Examination:  Abdomen: The umbilical wound is completely healed. A palpable healing ridge is noted in the epigastric region. There are no obvious hernias. There is no erythema or seroma noted. Assessment and Plan:  The patient was counseled to wear her abdominal binder for comfort. We discussed massaging the areas of scar tissue. Given her discomfort, I recommended that she avoid heavy lifting for an additional 3 to 4 weeks. The patient will follow up in 6 weeks for scar check. The plan was reviewed with the patient and questions were answered.

## 2022-08-12 ENCOUNTER — OFFICE VISIT (OUTPATIENT)
Dept: SURGERY | Facility: CLINIC | Age: 47
End: 2022-08-12
Payer: COMMERCIAL

## 2022-08-12 ENCOUNTER — TELEPHONE (OUTPATIENT)
Dept: SURGERY | Facility: CLINIC | Age: 47
End: 2022-08-12

## 2022-08-12 DIAGNOSIS — S31.105D OPEN WOUND OF UMBILICAL REGION, SUBSEQUENT ENCOUNTER: Primary | ICD-10-CM

## 2022-08-12 NOTE — PROGRESS NOTES
Greg Esqueda is a 41year old female who presents today for a follow-up. Been massaging the areas of subcutaneous scarring. She has resumed regular activity including work and denies discomfort. She denies any recurrent drainage. Physical Examination:  Abdomen: The umbilical wound is well-healed. Palpable subcutaneous scarring is noted in the supraumbilical region. There is no erythema or seroma noted. Assessment and Plan:  Patient is doing well. She instructed continue massage to the areas of scarring. She will follow-up in 6 months for final check. The plan was reviewed with the patient and questions were answered.

## 2022-12-19 ENCOUNTER — HOSPITAL ENCOUNTER (EMERGENCY)
Facility: HOSPITAL | Age: 47
Discharge: HOME | End: 2022-12-20
Attending: STUDENT IN AN ORGANIZED HEALTH CARE EDUCATION/TRAINING PROGRAM
Payer: COMMERCIAL

## 2022-12-19 VITALS
SYSTOLIC BLOOD PRESSURE: 113 MMHG | HEART RATE: 66 BPM | RESPIRATION RATE: 16 BRPM | BODY MASS INDEX: 32.95 KG/M2 | HEIGHT: 66 IN | WEIGHT: 205 LBS | DIASTOLIC BLOOD PRESSURE: 68 MMHG | OXYGEN SATURATION: 99 % | TEMPERATURE: 99 F

## 2022-12-19 DIAGNOSIS — L02.91 ABSCESS: Primary | ICD-10-CM

## 2022-12-19 PROCEDURE — 10061 I&D ABSCESS COMP/MULTIPLE: CPT

## 2022-12-19 PROCEDURE — 99283 EMERGENCY DEPT VISIT LOW MDM: CPT

## 2022-12-19 RX ORDER — CLINDAMYCIN HYDROCHLORIDE 300 MG/1
300 CAPSULE ORAL 3 TIMES DAILY
Qty: 21 CAPSULE | Refills: 0 | Status: SHIPPED | OUTPATIENT
Start: 2022-12-19 | End: 2022-12-26

## 2022-12-20 NOTE — ED INITIAL ASSESSMENT (HPI)
S: Left sided groin bump, that drained at some point but now is hard and painful. Patient recently returned from a trip to [place] where it was irritated by shorts she wore.

## 2022-12-20 NOTE — DISCHARGE INSTRUCTIONS
Leave antibiotics as prescribed for the next 7 days and follow-up with your doctor. You can pull the packing after 3 days, return to emergency department for worsening pain redness swelling fevers or if you are worried about any new or worsening symptoms. Thanks we hope you feel better soon.

## 2022-12-21 ENCOUNTER — OFFICE VISIT (OUTPATIENT)
Dept: FAMILY MEDICINE CLINIC | Facility: CLINIC | Age: 47
End: 2022-12-21
Payer: COMMERCIAL

## 2022-12-21 VITALS
OXYGEN SATURATION: 98 % | WEIGHT: 206 LBS | DIASTOLIC BLOOD PRESSURE: 76 MMHG | SYSTOLIC BLOOD PRESSURE: 118 MMHG | BODY MASS INDEX: 33.11 KG/M2 | HEART RATE: 97 BPM | HEIGHT: 66 IN

## 2022-12-21 DIAGNOSIS — I78.1 SPIDER VEIN, SYMPTOMATIC: ICD-10-CM

## 2022-12-21 DIAGNOSIS — L02.416 ABSCESS OF LEFT THIGH: Primary | ICD-10-CM

## 2022-12-21 PROCEDURE — 99213 OFFICE O/P EST LOW 20 MIN: CPT | Performed by: FAMILY MEDICINE

## 2022-12-21 PROCEDURE — 3074F SYST BP LT 130 MM HG: CPT | Performed by: FAMILY MEDICINE

## 2022-12-21 PROCEDURE — 3008F BODY MASS INDEX DOCD: CPT | Performed by: FAMILY MEDICINE

## 2022-12-21 PROCEDURE — 3078F DIAST BP <80 MM HG: CPT | Performed by: FAMILY MEDICINE

## 2023-02-21 ENCOUNTER — OFFICE VISIT (OUTPATIENT)
Dept: SURGERY | Facility: CLINIC | Age: 48
End: 2023-02-21
Payer: COMMERCIAL

## 2023-02-21 DIAGNOSIS — S30.1XXD ABDOMINAL WALL SEROMA, SUBSEQUENT ENCOUNTER: Primary | ICD-10-CM

## 2023-02-21 PROCEDURE — 99212 OFFICE O/P EST SF 10 MIN: CPT | Performed by: SURGERY

## 2023-03-05 ENCOUNTER — HOSPITAL ENCOUNTER (OUTPATIENT)
Age: 48
Discharge: HOME | End: 2023-03-05
Payer: COMMERCIAL

## 2023-03-05 VITALS
HEART RATE: 84 BPM | OXYGEN SATURATION: 100 % | RESPIRATION RATE: 18 BRPM | DIASTOLIC BLOOD PRESSURE: 92 MMHG | TEMPERATURE: 98 F | SYSTOLIC BLOOD PRESSURE: 132 MMHG

## 2023-03-05 DIAGNOSIS — U07.1 COVID-19: Primary | ICD-10-CM

## 2023-03-05 LAB
S PYO AG THROAT QL: NEGATIVE
SARS-COV-2 RNA RESP QL NAA+PROBE: DETECTED

## 2023-07-31 ENCOUNTER — OFFICE VISIT (OUTPATIENT)
Dept: OBGYN CLINIC | Facility: CLINIC | Age: 48
End: 2023-07-31
Payer: COMMERCIAL

## 2023-07-31 VITALS
WEIGHT: 206 LBS | SYSTOLIC BLOOD PRESSURE: 120 MMHG | HEIGHT: 66 IN | DIASTOLIC BLOOD PRESSURE: 86 MMHG | BODY MASS INDEX: 33.11 KG/M2

## 2023-07-31 DIAGNOSIS — Z12.31 ENCOUNTER FOR SCREENING MAMMOGRAM FOR MALIGNANT NEOPLASM OF BREAST: ICD-10-CM

## 2023-07-31 DIAGNOSIS — R10.2 PELVIC PAIN: ICD-10-CM

## 2023-07-31 DIAGNOSIS — Z12.11 COLON CANCER SCREENING: ICD-10-CM

## 2023-07-31 DIAGNOSIS — Z01.419 ENCOUNTER FOR GYNECOLOGICAL EXAMINATION WITHOUT ABNORMAL FINDING: Primary | ICD-10-CM

## 2023-07-31 DIAGNOSIS — Z12.4 SCREENING FOR CERVICAL CANCER: ICD-10-CM

## 2023-07-31 PROCEDURE — 87624 HPV HI-RISK TYP POOLED RSLT: CPT | Performed by: OBSTETRICS & GYNECOLOGY

## 2023-08-01 LAB — HPV I/H RISK 1 DNA SPEC QL NAA+PROBE: NEGATIVE

## 2023-08-30 ENCOUNTER — TELEPHONE (OUTPATIENT)
Dept: OBGYN CLINIC | Facility: CLINIC | Age: 48
End: 2023-08-30

## 2023-09-12 ENCOUNTER — TELEPHONE (OUTPATIENT)
Dept: OBGYN CLINIC | Facility: CLINIC | Age: 48
End: 2023-09-12

## 2023-09-20 ENCOUNTER — OFFICE VISIT (OUTPATIENT)
Facility: CLINIC | Age: 48
End: 2023-09-20

## 2023-09-20 ENCOUNTER — TELEPHONE (OUTPATIENT)
Facility: CLINIC | Age: 48
End: 2023-09-20

## 2023-09-20 VITALS
BODY MASS INDEX: 33.27 KG/M2 | SYSTOLIC BLOOD PRESSURE: 119 MMHG | DIASTOLIC BLOOD PRESSURE: 81 MMHG | HEART RATE: 70 BPM | WEIGHT: 207 LBS | HEIGHT: 66 IN

## 2023-09-20 DIAGNOSIS — K64.4 EXTERNAL HEMORRHOIDS: ICD-10-CM

## 2023-09-20 DIAGNOSIS — K64.8 INTERNAL HEMORRHOIDS: ICD-10-CM

## 2023-09-20 DIAGNOSIS — Z12.11 COLON CANCER SCREENING: Primary | ICD-10-CM

## 2023-09-20 DIAGNOSIS — K64.4 EXTERNAL HEMORRHOIDS: Primary | ICD-10-CM

## 2023-09-20 DIAGNOSIS — Z12.11 COLON CANCER SCREENING: ICD-10-CM

## 2023-09-20 DIAGNOSIS — K59.09 CHRONIC CONSTIPATION: ICD-10-CM

## 2023-09-20 PROCEDURE — 3079F DIAST BP 80-89 MM HG: CPT

## 2023-09-20 PROCEDURE — 3008F BODY MASS INDEX DOCD: CPT

## 2023-09-20 PROCEDURE — 99204 OFFICE O/P NEW MOD 45 MIN: CPT

## 2023-09-20 PROCEDURE — 3074F SYST BP LT 130 MM HG: CPT

## 2023-09-20 NOTE — TELEPHONE ENCOUNTER
Scheduled for:  Colonoscopy [address] , [facility]   Provider Name:    Date:  11/16/2023  Location:  [facility]  Sedation:  Mac   Time: 2:15 Pm (pt is aware to arrive at 1:15 Pm)   Prep:  Golytely Prep instructions were given to pt in the office, I discuss prep Instructions with patient at the time of the appointment which she verbally understood and given the prep instructions at the time of the appointment  Meds/Allergies Reconciled?:  Physician reviewed     Diagnosis with codes:  Internal hemorrhoid K64.8, external hemorrhoid K64.4 , Chronic constipation K59.09 ,Colon cancer screening Z12.11  Was patient informed to call insurance with codes (Y/N):  Yes, I confirmed bc[employer] p h[employer] insurance with the patient. The patient also verbally understands to call her insurance to check for pre-cert, codes were given on prep instructions. Referral sent?:  Referral was sent at the time of electronic surgical scheduling. [facility] or [id number] notified?:  I sent an electronic request to Endo Scheduling and received a confirmation today. Medication Orders: This patient verbally confirmed that she is not taking:   Iron, blood thinners, BP meds, and is not diabetic   Not latex allergy, Not PCN allergy and does not have a pacemaker Pt is aware to NOT take iron pills, herbal meds and diet supplements for 7 days before exam. Also to NOT take any form of alcohol, recreational drugs and any forms of ED meds 24 hours before exam.    Misc Orders:  Patient verbally understood & will await a phone call from [facility] to schedule. Further instructions given by staff:   Patient was informed about the new cancellation policy for his/her procedure. Patient was also given a copy of the cancellation policy at the time of the appointment and verbalized understanding.

## 2023-09-20 NOTE — PATIENT INSTRUCTIONS
-call to make surgery appointment    1. Schedule colonoscopy with Dr. Nancy Turner, Dr. Saad Woodruff or Dr. Sae James   Diagnosis: hemorrhoids  Sedation: MAC  Prep: split dose golytely    2.  bowel prep from pharmacy (split dose golytely)  You can pick the bowel prep up now and store in a cool, dry place in your home until your scheduled bowel prep start date. 3. Continue all medications as normal for your procedure. DO NOT TAKE: Any form of alcohol, recreational drugs and any forms of erectile dysfunction medications 24 hours prior to procedure. 4. Read all bowel prep instructions carefully. Bowel prep instructions can also be found online at:  [email or web address]     5. AVOID seeds, nuts, popcorn, raw fruits and vegetables for 2 days before procedure    6. If you start any NEW medication after your visit today, please notify us. Certain medications (like iron or weight loss medications) will need to be held before the procedure, or the procedure cannot be performed safely.          Recommendations:  -begin fiber supplement daily  -take miralax (over the counter) x 2 weeks  -can use zinc oxide (diaper cream - desitin) to decrease irritation of hemorrhoids prior to colonoscopy when doing bowel prep  -increase physical activity, water intake & dietary fiber  -use over the counter hemorrhoid cream and suppository (hydrocortisone cream, such as preparation H) for 1 week at a time during flares  -can do sitz bath during flare  -use wet wipes (baby wipes) instead of toilet paper   -avoid sitting on toilet for long periods of time and avoid straining      Fiber Supplements  -Metamucil (psyllium- both soluble & insoluble) *  -Citrucel (methylcellulose mostly insoluble) *  -Fibercon (polycarbophil- mostly soluble)  -Benefiber (wheat dextrin- mostly soluble)

## 2023-10-09 ENCOUNTER — ULTRASOUND ENCOUNTER (OUTPATIENT)
Dept: OBGYN CLINIC | Facility: CLINIC | Age: 48
End: 2023-10-09
Payer: COMMERCIAL

## 2023-10-09 ENCOUNTER — HOSPITAL ENCOUNTER (OUTPATIENT)
Dept: MAMMOGRAPHY | Age: 48
Discharge: HOME | End: 2023-10-09
Attending: OBSTETRICS & GYNECOLOGY
Payer: COMMERCIAL

## 2023-10-09 DIAGNOSIS — Z12.31 ENCOUNTER FOR SCREENING MAMMOGRAM FOR MALIGNANT NEOPLASM OF BREAST: ICD-10-CM

## 2023-10-09 DIAGNOSIS — R10.2 PELVIC PAIN: ICD-10-CM

## 2023-10-09 PROCEDURE — 77063 BREAST TOMOSYNTHESIS BI: CPT | Performed by: OBSTETRICS & GYNECOLOGY

## 2023-10-09 PROCEDURE — 77067 SCR MAMMO BI INCL CAD: CPT | Performed by: OBSTETRICS & GYNECOLOGY

## 2023-11-16 ENCOUNTER — HOSPITAL ENCOUNTER (OUTPATIENT)
Age: 48
Setting detail: HOSPITAL OUTPATIENT SURGERY
Discharge: HOME | End: 2023-11-16
Attending: INTERNAL MEDICINE | Admitting: INTERNAL MEDICINE
Payer: COMMERCIAL

## 2023-11-16 ENCOUNTER — ANESTHESIA EVENT (OUTPATIENT)
Dept: ENDOSCOPY | Age: 48
End: 2023-11-16
Payer: COMMERCIAL

## 2023-11-16 ENCOUNTER — ANESTHESIA (OUTPATIENT)
Dept: ENDOSCOPY | Age: 48
End: 2023-11-16
Payer: COMMERCIAL

## 2023-11-16 VITALS
WEIGHT: 204 LBS | OXYGEN SATURATION: 98 % | DIASTOLIC BLOOD PRESSURE: 86 MMHG | TEMPERATURE: 97 F | SYSTOLIC BLOOD PRESSURE: 121 MMHG | HEIGHT: 66 IN | RESPIRATION RATE: 21 BRPM | HEART RATE: 72 BPM | BODY MASS INDEX: 32.78 KG/M2

## 2023-11-16 DIAGNOSIS — K64.8 INTERNAL HEMORRHOIDS: ICD-10-CM

## 2023-11-16 DIAGNOSIS — K64.4 EXTERNAL HEMORRHOIDS: ICD-10-CM

## 2023-11-16 DIAGNOSIS — K59.09 CHRONIC CONSTIPATION: ICD-10-CM

## 2023-11-16 DIAGNOSIS — Z12.11 COLON CANCER SCREENING: ICD-10-CM

## 2023-11-16 LAB — B-HCG UR QL: NEGATIVE

## 2023-11-16 PROCEDURE — 88305 TISSUE EXAM BY PATHOLOGIST: CPT | Performed by: INTERNAL MEDICINE

## 2023-11-16 PROCEDURE — 81025 URINE PREGNANCY TEST: CPT

## 2023-11-16 RX ORDER — SODIUM CHLORIDE, SODIUM LACTATE, POTASSIUM CHLORIDE, CALCIUM CHLORIDE 600; 310; 30; 20 MG/100ML; MG/100ML; MG/100ML; MG/100ML
INJECTION, SOLUTION INTRAVENOUS CONTINUOUS
OUTPATIENT
Start: 2023-11-16

## 2023-11-16 RX ORDER — NALOXONE HYDROCHLORIDE 0.4 MG/ML
0.08 INJECTION, SOLUTION INTRAMUSCULAR; INTRAVENOUS; SUBCUTANEOUS ONCE AS NEEDED
OUTPATIENT
Start: 2023-11-16 | End: 2023-11-16

## 2023-11-16 RX ORDER — SODIUM CHLORIDE, SODIUM LACTATE, POTASSIUM CHLORIDE, CALCIUM CHLORIDE 600; 310; 30; 20 MG/100ML; MG/100ML; MG/100ML; MG/100ML
INJECTION, SOLUTION INTRAVENOUS CONTINUOUS
Status: DISCONTINUED | OUTPATIENT
Start: 2023-11-16 | End: 2023-11-16

## 2023-11-16 RX ADMIN — SODIUM CHLORIDE, SODIUM LACTATE, POTASSIUM CHLORIDE, CALCIUM CHLORIDE: 600; 310; 30; 20 INJECTION, SOLUTION INTRAVENOUS at 14:35:00

## 2023-11-16 NOTE — OPERATIVE REPORT
[address] Endoscopy Report      Date of Procedure:  11/16/23      Preoperative Diagnosis:  1. Colorectal cancer screening  2. Chronically symptomatic hemorrhoids      Postoperative Diagnosis:  1. Lipomatous ileocecal valve  2. Small internal hemorrhoids  3. Prominent external skin tags      Procedure:    Colonoscopy with biopsy      Surgeon:  Maureen Colon M.D. Anesthesia:  Monitored anesthesia care  Cecal withdrawal time: 23 minutes  EBL:  Insignificant      Brief History: This is a 57year old female who presents for screening colonoscopy. She has chronically symptomatic hemorrhoids with prolapse with previously failed rubber band ligation. She is otherwise asymptomatic from a lower gastrointestinal tract standpoint. Technique:  After informed consent, the patient was placed in the left lateral recumbent position. Digital rectal examination revealed prominent external skin tags. Sphincter tone was not overly heightened. There were no palpable intraluminal abnormalities. An Olympus variable stiffness 190 series HD pediatric colonoscope was inserted into the rectum and advanced under direct vision by following the lumen to the terminal ileum. The colon was examined upon withdrawal in the left lateral recumbent position. Findings:  The preparation of the colon was good. The terminal ileum was examined for 5 cm and visually normal.  The ileocecal valve was lobulated and lipomatous but widely patent and well-preserved. Biopsies were obtained with extrusion of fat. The visualized colonic mucosa from the cecum to the anal verge was normal with an intact vascular pattern. There were no other colonic polyps, definite diverticula, mass lesions, vascular anomalies or signs of inflammation seen. Retroflexion in the right colon was not possible due to space limitations and induced a small superficial mucosal tear.   This was closed out of an abundance of caution with #2 hemostatic clips. Retroflexion in the rectum revealed small internal hemorrhoids and scar deformity from past band ligation. Prominent external skin tags were present. The procedure was well tolerated without immediate complication. Impression:  1. Lipomatous ileocecal valve  2. Small internal hemorrhoids  3. Prominent external skin tags    Recommendations:  1. Follow-up biopsy results. 2.  Screening colonoscopy in 10 years if no adenomatous tissue was present.   3.  Evaluation by colorectal surgery if the patient's symptoms persist.        Alexis Plascencia MD  11/16/2023
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
## 2023-11-16 NOTE — DISCHARGE INSTRUCTIONS

## 2023-11-20 ENCOUNTER — TELEPHONE (OUTPATIENT)
Facility: CLINIC | Age: 48
End: 2023-11-20

## 2023-11-21 NOTE — TELEPHONE ENCOUNTER
----- Message from Alex Norwood MD sent at 11/17/2023  5:56 PM CST -----  García Hammer, can you let Isac Merrill know that biopsies of the ileocecal valve showed no precancerous changes. The remainder the examination was normal.  A screening colonoscopy should be repeated in 10 years. She should follow-up with either colorectal or general surgery regarding her chronic anorectal symptoms. GI RNs: Please enter colonoscopy recall for 10 years.

## 2023-11-21 NOTE — TELEPHONE ENCOUNTER
10  year colonoscopy recall entered. Health maintenance updated. Colonoscopy done on 11/16/23 and next due on 11/16/33.

## 2024-04-10 ENCOUNTER — OFFICE VISIT (OUTPATIENT)
Dept: SURGERY | Facility: CLINIC | Age: 49
End: 2024-04-10
Payer: COMMERCIAL

## 2024-04-10 VITALS — BODY MASS INDEX: 33 KG/M2 | WEIGHT: 204 LBS

## 2024-04-10 DIAGNOSIS — K64.9 HEMORRHOIDS, UNSPECIFIED HEMORRHOID TYPE: Primary | ICD-10-CM

## 2024-04-10 DIAGNOSIS — K62.5 RECTAL BLEEDING: ICD-10-CM

## 2024-04-10 NOTE — PATIENT INSTRUCTIONS
Obtain your preoperative testing.  Day surgery will call with further instructions.    Stop aspirin and NSAIDs for 5 days prior to surgery.
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
## 2024-04-20 ENCOUNTER — PATIENT MESSAGE (OUTPATIENT)
Dept: OBGYN CLINIC | Facility: CLINIC | Age: 49
End: 2024-04-20

## 2024-04-22 NOTE — TELEPHONE ENCOUNTER
From: Christy Partida  To: Mary Piedra  Sent: 4/20/2024 5:59 AM CDT  Subject: Update from last message     Hey Dr Tamayo, I’m still having issues with an odor that is not my usual odor. I thought it may have been a yeast infection but it not. I took the prescription and it’s still there. I think I need to come in to see you. I do have a doctor appointment with the new doctor for family practice on April 25. Should I just wait to see her with this issue or schedule an appointment with you.

## 2024-04-25 ENCOUNTER — LAB ENCOUNTER (OUTPATIENT)
Dept: LAB | Facility: REFERENCE LAB | Age: 49
End: 2024-04-25
Attending: FAMILY MEDICINE
Payer: COMMERCIAL

## 2024-04-25 ENCOUNTER — OFFICE VISIT (OUTPATIENT)
Facility: CLINIC | Age: 49
End: 2024-04-25
Payer: COMMERCIAL

## 2024-04-25 ENCOUNTER — EKG ENCOUNTER (OUTPATIENT)
Dept: LAB | Age: 49
End: 2024-04-25
Attending: FAMILY MEDICINE
Payer: COMMERCIAL

## 2024-04-25 ENCOUNTER — PATIENT MESSAGE (OUTPATIENT)
Facility: CLINIC | Age: 49
End: 2024-04-25

## 2024-04-25 ENCOUNTER — TELEPHONE (OUTPATIENT)
Facility: CLINIC | Age: 49
End: 2024-04-25

## 2024-04-25 VITALS
DIASTOLIC BLOOD PRESSURE: 66 MMHG | HEART RATE: 76 BPM | OXYGEN SATURATION: 98 % | SYSTOLIC BLOOD PRESSURE: 120 MMHG | WEIGHT: 211 LBS | BODY MASS INDEX: 34 KG/M2

## 2024-04-25 DIAGNOSIS — R94.31 NONSPECIFIC ABNORMAL ELECTROCARDIOGRAM (ECG) (EKG): Primary | ICD-10-CM

## 2024-04-25 DIAGNOSIS — N76.0 ACUTE VAGINITIS: ICD-10-CM

## 2024-04-25 DIAGNOSIS — Z00.01 ENCOUNTER FOR GENERAL ADULT MEDICAL EXAMINATION WITH ABNORMAL FINDINGS: Primary | ICD-10-CM

## 2024-04-25 DIAGNOSIS — K64.9 HEMORRHOIDS, UNSPECIFIED HEMORRHOID TYPE: ICD-10-CM

## 2024-04-25 DIAGNOSIS — R42 LIGHTHEADEDNESS: ICD-10-CM

## 2024-04-25 DIAGNOSIS — R06.02 SHORTNESS OF BREATH: ICD-10-CM

## 2024-04-25 DIAGNOSIS — J30.89 NON-SEASONAL ALLERGIC RHINITIS, UNSPECIFIED TRIGGER: ICD-10-CM

## 2024-04-25 DIAGNOSIS — Z00.01 ENCOUNTER FOR GENERAL ADULT MEDICAL EXAMINATION WITH ABNORMAL FINDINGS: ICD-10-CM

## 2024-04-25 DIAGNOSIS — R00.2 PALPITATIONS: ICD-10-CM

## 2024-04-25 PROBLEM — S30.1XXD ABDOMINAL WALL SEROMA, SUBSEQUENT ENCOUNTER: Status: RESOLVED | Noted: 2019-06-12 | Resolved: 2024-04-25

## 2024-04-25 PROBLEM — J32.9 PURULENT POSTNASAL DRAINAGE: Status: RESOLVED | Noted: 2018-05-29 | Resolved: 2024-04-25

## 2024-04-25 PROBLEM — G47.9 SLEEP DISTURBANCE: Status: RESOLVED | Noted: 2020-06-15 | Resolved: 2024-04-25

## 2024-04-25 PROBLEM — S31.105A OPEN WOUND OF UMBILICAL REGION: Status: RESOLVED | Noted: 2022-05-27 | Resolved: 2024-04-25

## 2024-04-25 PROBLEM — R10.816 EPIGASTRIC ABDOMINAL TENDERNESS WITHOUT REBOUND TENDERNESS: Status: RESOLVED | Noted: 2019-05-15 | Resolved: 2024-04-25

## 2024-04-25 PROBLEM — L91.0 SCARRING, HYPERTROPHIC: Status: RESOLVED | Noted: 2019-01-28 | Resolved: 2024-04-25

## 2024-04-25 PROBLEM — S43.431A SUPERIOR GLENOID LABRUM LESION OF RIGHT SHOULDER: Status: RESOLVED | Noted: 2018-09-24 | Resolved: 2024-04-25

## 2024-04-25 PROBLEM — M62.9 HAMSTRING TIGHTNESS OF BOTH LOWER EXTREMITIES: Status: RESOLVED | Noted: 2020-06-15 | Resolved: 2024-04-25

## 2024-04-25 PROBLEM — R73.03 PREDIABETES: Status: ACTIVE | Noted: 2024-04-25

## 2024-04-25 PROBLEM — L91.0 KELOID OF SKIN: Status: RESOLVED | Noted: 2019-03-11 | Resolved: 2024-04-25

## 2024-04-25 PROBLEM — M75.101 TEAR OF RIGHT SUPRASPINATUS TENDON: Status: RESOLVED | Noted: 2018-01-30 | Resolved: 2024-04-25

## 2024-04-25 PROBLEM — L02.211 ABDOMINAL WALL ABSCESS: Status: RESOLVED | Noted: 2018-06-27 | Resolved: 2024-04-25

## 2024-04-25 LAB
ALBUMIN SERPL-MCNC: 4.1 G/DL (ref 3.2–4.8)
ALBUMIN/GLOB SERPL: 1.1 {RATIO} (ref 1–2)
ALP LIVER SERPL-CCNC: 89 U/L
ALT SERPL-CCNC: 13 U/L
ANION GAP SERPL CALC-SCNC: 3 MMOL/L (ref 0–18)
AST SERPL-CCNC: 20 U/L (ref ?–34)
ATRIAL RATE: 61 BPM
BASOPHILS # BLD AUTO: 0.04 X10(3) UL (ref 0–0.2)
BASOPHILS NFR BLD AUTO: 0.9 %
BILIRUB SERPL-MCNC: 0.6 MG/DL (ref 0.3–1.2)
BUN BLD-MCNC: 11 MG/DL (ref 9–23)
BUN/CREAT SERPL: 12.5 (ref 10–20)
CALCIUM BLD-MCNC: 9.9 MG/DL (ref 8.7–10.4)
CHLORIDE SERPL-SCNC: 107 MMOL/L (ref 98–112)
CHOLEST SERPL-MCNC: 202 MG/DL (ref ?–200)
CO2 SERPL-SCNC: 30 MMOL/L (ref 21–32)
CREAT BLD-MCNC: 0.88 MG/DL
DEPRECATED RDW RBC AUTO: 39.2 FL (ref 35.1–46.3)
EGFRCR SERPLBLD CKD-EPI 2021: 82 ML/MIN/1.73M2 (ref 60–?)
EOSINOPHIL # BLD AUTO: 0.15 X10(3) UL (ref 0–0.7)
EOSINOPHIL NFR BLD AUTO: 3.2 %
ERYTHROCYTE [DISTWIDTH] IN BLOOD BY AUTOMATED COUNT: 12.2 % (ref 11–15)
EST. AVERAGE GLUCOSE BLD GHB EST-MCNC: 126 MG/DL (ref 68–126)
FASTING PATIENT LIPID ANSWER: YES
FASTING STATUS PATIENT QL REPORTED: YES
GLOBULIN PLAS-MCNC: 3.6 G/DL (ref 2.8–4.4)
GLUCOSE BLD-MCNC: 101 MG/DL (ref 70–99)
HBA1C MFR BLD: 6 % (ref ?–5.7)
HCT VFR BLD AUTO: 40.4 %
HDLC SERPL-MCNC: 58 MG/DL (ref 40–59)
HGB BLD-MCNC: 12.8 G/DL
IMM GRANULOCYTES # BLD AUTO: 0 X10(3) UL (ref 0–1)
IMM GRANULOCYTES NFR BLD: 0 %
LDLC SERPL CALC-MCNC: 137 MG/DL (ref ?–100)
LYMPHOCYTES # BLD AUTO: 2.3 X10(3) UL (ref 1–4)
LYMPHOCYTES NFR BLD AUTO: 49.4 %
MCH RBC QN AUTO: 28 PG (ref 26–34)
MCHC RBC AUTO-ENTMCNC: 31.7 G/DL (ref 31–37)
MCV RBC AUTO: 88.4 FL
MONOCYTES # BLD AUTO: 0.33 X10(3) UL (ref 0.1–1)
MONOCYTES NFR BLD AUTO: 7.1 %
NEUTROPHILS # BLD AUTO: 1.84 X10 (3) UL (ref 1.5–7.7)
NEUTROPHILS # BLD AUTO: 1.84 X10(3) UL (ref 1.5–7.7)
NEUTROPHILS NFR BLD AUTO: 39.4 %
NONHDLC SERPL-MCNC: 144 MG/DL (ref ?–130)
OSMOLALITY SERPL CALC.SUM OF ELEC: 290 MOSM/KG (ref 275–295)
P AXIS: 42 DEGREES
P-R INTERVAL: 152 MS
PLATELET # BLD AUTO: 283 10(3)UL (ref 150–450)
POTASSIUM SERPL-SCNC: 4.4 MMOL/L (ref 3.5–5.1)
PROT SERPL-MCNC: 7.7 G/DL (ref 5.7–8.2)
Q-T INTERVAL: 370 MS
QRS DURATION: 74 MS
QTC CALCULATION (BEZET): 372 MS
R AXIS: 11 DEGREES
RBC # BLD AUTO: 4.57 X10(6)UL
SODIUM SERPL-SCNC: 140 MMOL/L (ref 136–145)
T AXIS: -25 DEGREES
TRIGL SERPL-MCNC: 40 MG/DL (ref 30–149)
TSI SER-ACNC: 0.93 MIU/ML (ref 0.55–4.78)
VENTRICULAR RATE: 61 BPM
VLDLC SERPL CALC-MCNC: 7 MG/DL (ref 0–30)
WBC # BLD AUTO: 4.7 X10(3) UL (ref 4–11)

## 2024-04-25 PROCEDURE — 84443 ASSAY THYROID STIM HORMONE: CPT

## 2024-04-25 PROCEDURE — 81514 NFCT DS BV&VAGINITIS DNA ALG: CPT | Performed by: FAMILY MEDICINE

## 2024-04-25 PROCEDURE — 85025 COMPLETE CBC W/AUTO DIFF WBC: CPT | Performed by: FAMILY MEDICINE

## 2024-04-25 PROCEDURE — 93010 ELECTROCARDIOGRAM REPORT: CPT | Performed by: INTERNAL MEDICINE

## 2024-04-25 PROCEDURE — 93005 ELECTROCARDIOGRAM TRACING: CPT

## 2024-04-25 PROCEDURE — 80053 COMPREHEN METABOLIC PANEL: CPT

## 2024-04-25 PROCEDURE — 96127 BRIEF EMOTIONAL/BEHAV ASSMT: CPT | Performed by: FAMILY MEDICINE

## 2024-04-25 PROCEDURE — 3078F DIAST BP <80 MM HG: CPT | Performed by: FAMILY MEDICINE

## 2024-04-25 PROCEDURE — 36415 COLL VENOUS BLD VENIPUNCTURE: CPT

## 2024-04-25 PROCEDURE — 83036 HEMOGLOBIN GLYCOSYLATED A1C: CPT

## 2024-04-25 PROCEDURE — 99203 OFFICE O/P NEW LOW 30 MIN: CPT | Performed by: FAMILY MEDICINE

## 2024-04-25 PROCEDURE — 99386 PREV VISIT NEW AGE 40-64: CPT | Performed by: FAMILY MEDICINE

## 2024-04-25 PROCEDURE — 3074F SYST BP LT 130 MM HG: CPT | Performed by: FAMILY MEDICINE

## 2024-04-25 PROCEDURE — 80061 LIPID PANEL: CPT

## 2024-04-25 RX ORDER — MONTELUKAST SODIUM 10 MG/1
10 TABLET ORAL NIGHTLY
Qty: 90 TABLET | Refills: 3 | Status: SHIPPED | OUTPATIENT
Start: 2024-04-25

## 2024-04-25 RX ORDER — FLUTICASONE PROPIONATE 50 MCG
1 SPRAY, SUSPENSION (ML) NASAL 2 TIMES DAILY
Qty: 1 EACH | Refills: 3 | Status: SHIPPED | OUTPATIENT
Start: 2024-04-25 | End: 2024-04-29 | Stop reason: ALTCHOICE

## 2024-04-25 NOTE — PATIENT INSTRUCTIONS
For the allergies that is likely causing the plegm and cough attacks. Try a daily nasal spray like flonase. Do not use Afrin

## 2024-04-25 NOTE — TELEPHONE ENCOUNTER
New T wave inversions noted on EKG compared to EKG from 1/11/2021. Patient not actively having symptoms in office. Ordered stress test.     Yandy Velez MD, 04/25/24, 11:04 AM

## 2024-04-25 NOTE — PROGRESS NOTES
Subjective:   Christy Partida is a 49 year old female who presents for Physical (Patient is requesting an annual physical exam. Patient complains of dizzy spells and feeling jittery. Patient complains of bad vaginal odor. )     Dizziness  Patient has had for years. Not worsening. Just wanted to mention because assumes it stems from not eating. Is a . Will get dizzy on route. Knows when feels like this to eat and sit and will resolve. Wants to make sure nothing like diabetes is going given family history. Patient describes lightheadedness. Not vertigo. No chest pain. Some shortness of breath. Patient thinks this is more due to panic because will sometimes worry is too far from truck. +palpitations. No diaphoresis. Will also note nausea.     Cough  Also for years. Will have coughing fits. Worse at night. Feels like something is ticking in throat. Patient does note has a lot of phlegm. Patient has been treated for seasonal allergies in the past. Patient was on montelukast and nasal spray. No symptoms of gerd. No dysphagia.     Vaginal odor  Patient received diflucan recently for OB which did help with initial odor. Then developed a different odor. No new discharge. No pruritus.      Hemorrhoids  Having surgery with general surgery in June. Has labs ordered by surgeon    History/Other:    Chief Complaint Reviewed and Verified  Nursing Notes Reviewed and   Verified  Tobacco Reviewed  Allergies Reviewed  Medications Reviewed    Problem List Reviewed  Medical History Reviewed  Surgical History   Reviewed  Family History Reviewed  Social History Reviewed         Tobacco:  She has never smoked tobacco.    Current Outpatient Medications   Medication Sig Dispense Refill    fluticasone propionate 50 MCG/ACT Nasal Suspension 1 spray by Each Nare route in the morning and 1 spray before bedtime. 1 each 3    montelukast 10 MG Oral Tab Take 1 tablet (10 mg total) by mouth nightly. 90 tablet 3    fluconazole  (DIFLUCAN) 150 MG Oral Tab Take 1 tablet by mouth now. Please call office if not resolved after dose. 1 tablet 0    Cetirizine HCl (ZYRTEC ALLERGY OR) Take by mouth daily. (Patient not taking: Reported on 4/25/2024)           Review of Systems:  Review of Systems   Constitutional: Negative.    HENT: Negative.     Eyes: Negative.    Respiratory:  Positive for cough and shortness of breath.    Cardiovascular:  Positive for palpitations.   Gastrointestinal:  Positive for nausea.   Genitourinary: Negative.         +vaginal odor   Musculoskeletal: Negative.    Skin: Negative.    Neurological:  Positive for light-headedness.   Psychiatric/Behavioral: Negative.         Objective:   /66 (BP Location: Left arm, Patient Position: Sitting, Cuff Size: adult)   Pulse 76   Wt 211 lb (95.7 kg)   LMP 07/17/2023   SpO2 98%   BMI 34.06 kg/m²  Estimated body mass index is 34.06 kg/m² as calculated from the following:    Height as of 11/10/23: 5' 6\" (1.676 m).    Weight as of this encounter: 211 lb (95.7 kg).  Physical Exam  Vitals and nursing note reviewed.   Constitutional:       General: She is not in acute distress.     Appearance: Normal appearance. She is not ill-appearing.   HENT:      Head: Normocephalic and atraumatic.      Right Ear: Tympanic membrane normal. There is no impacted cerumen.      Left Ear: Tympanic membrane normal. There is no impacted cerumen.      Nose:      Right Turbinates: Enlarged, swollen and pale.      Left Turbinates: Enlarged, swollen and pale.      Mouth/Throat:      Mouth: Mucous membranes are moist.      Pharynx: Oropharynx is clear. No oropharyngeal exudate or posterior oropharyngeal erythema.   Eyes:      General:         Right eye: No discharge.         Left eye: No discharge.      Extraocular Movements: Extraocular movements intact.      Pupils: Pupils are equal, round, and reactive to light.   Cardiovascular:      Rate and Rhythm: Normal rate and regular rhythm.      Heart sounds:  Normal heart sounds. No murmur heard.     No friction rub. No gallop.   Pulmonary:      Effort: Pulmonary effort is normal.      Breath sounds: Normal breath sounds. No wheezing, rhonchi or rales.   Abdominal:      General: Abdomen is flat. Bowel sounds are normal. There is no distension.      Palpations: Abdomen is soft. There is no mass.      Tenderness: There is no abdominal tenderness. There is no guarding or rebound.   Genitourinary:     Comments: No vaginal discharge noted at introitus  Musculoskeletal:         General: Normal range of motion.      Cervical back: Normal range of motion.      Right lower leg: No edema.      Left lower leg: No edema.   Skin:     General: Skin is warm and dry.      Findings: No rash.   Neurological:      General: No focal deficit present.      Mental Status: She is alert. Mental status is at baseline.   Psychiatric:         Mood and Affect: Mood normal.         Behavior: Behavior normal.       Offered patient chaperone for  exam. Patient declined chaperone      Assessment & Plan:   1. Encounter for general adult medical examination with abnormal findings (Primary)  -     Lipid Panel; Future; Expected date: 04/25/2024  -     Hemoglobin A1C; Future; Expected date: 04/25/2024  -     TSH W Reflex To Free T4; Future; Expected date: 04/25/2024  -     CBC With Differential With Platelet  -     Comp Metabolic Panel (14); Future; Expected date: 04/25/2024    -ordered annual labs    2. Acute vaginitis  -     Vaginitis Vaginosis PCR Panel; Future; Expected date: 04/25/2024    -collected vaginal swab     3. Lightheadedness  4. Palpitations  5. Shortness of breath  -     EKG 12 Lead; Future; Expected date: 04/25/2024  -agree with patient that symptoms likely due in large part to being fasted for so long. Will often not eat until 2 p.m. and has physical job  -will ensure no other anemia, electrolyte, thyroid or less likely cardiac etiology for symptoms    6. Non-seasonal allergic rhinitis,  unspecified trigger  -     Fluticasone Propionate; 1 spray by Each Nare route in the morning and 1 spray before bedtime.  Dispense: 1 each; Refill: 3  -     Montelukast Sodium; Take 1 tablet (10 mg total) by mouth nightly.  Dispense: 90 tablet; Refill: 3    -able to appreciate turbinate thickening and pale mucosa on exam. Chronic cough likely due to post nasal drip.   -restarted singulair and flonase    Return in about 1 year (around 4/25/2025), or if symptoms worsen or fail to improve.    Yandy Velez MD, 4/25/2024, 8:52 AM

## 2024-04-26 DIAGNOSIS — B96.89 BACTERIAL VAGINITIS: Primary | ICD-10-CM

## 2024-04-26 DIAGNOSIS — N76.0 BACTERIAL VAGINITIS: Primary | ICD-10-CM

## 2024-04-26 LAB
BV BACTERIA DNA VAG QL NAA+PROBE: POSITIVE
C GLABRATA DNA VAG QL NAA+PROBE: NEGATIVE
C KRUSEI DNA VAG QL NAA+PROBE: NEGATIVE
CANDIDA DNA VAG QL NAA+PROBE: NEGATIVE
T VAGINALIS DNA VAG QL NAA+PROBE: NEGATIVE

## 2024-04-26 RX ORDER — METRONIDAZOLE 500 MG/1
500 TABLET ORAL 2 TIMES DAILY
Qty: 14 TABLET | Refills: 0 | Status: SHIPPED | OUTPATIENT
Start: 2024-04-26 | End: 2024-05-03

## 2024-04-29 ENCOUNTER — TELEPHONE (OUTPATIENT)
Facility: CLINIC | Age: 49
End: 2024-04-29

## 2024-04-29 DIAGNOSIS — J30.89 NON-SEASONAL ALLERGIC RHINITIS, UNSPECIFIED TRIGGER: Primary | ICD-10-CM

## 2024-04-29 RX ORDER — MOMETASONE FUROATE 50 UG/1
1 SPRAY, METERED NASAL DAILY
Qty: 17 G | Refills: 1 | Status: SHIPPED | OUTPATIENT
Start: 2024-04-29

## 2024-04-30 NOTE — TELEPHONE ENCOUNTER
Please assist patient in scheduling a video visit with me for follow up. Can use any SDA. Thanks    Yandy Velez MD, 04/30/24, 8:19 AM

## 2024-05-01 ENCOUNTER — HOSPITAL ENCOUNTER (OUTPATIENT)
Age: 49
Discharge: HOME | End: 2024-05-01
Payer: COMMERCIAL

## 2024-05-01 ENCOUNTER — APPOINTMENT (OUTPATIENT)
Dept: GENERAL RADIOLOGY | Age: 49
End: 2024-05-01
Attending: NURSE PRACTITIONER
Payer: COMMERCIAL

## 2024-05-01 VITALS
RESPIRATION RATE: 18 BRPM | TEMPERATURE: 98 F | DIASTOLIC BLOOD PRESSURE: 79 MMHG | OXYGEN SATURATION: 98 % | SYSTOLIC BLOOD PRESSURE: 131 MMHG | HEART RATE: 91 BPM

## 2024-05-01 DIAGNOSIS — S69.92XA HAND INJURY, LEFT, INITIAL ENCOUNTER: ICD-10-CM

## 2024-05-01 DIAGNOSIS — S69.92XA INJURY OF LEFT WRIST, INITIAL ENCOUNTER: Primary | ICD-10-CM

## 2024-05-01 PROCEDURE — L3809 WHFO W/O JOINTS PRE OTS: HCPCS | Performed by: NURSE PRACTITIONER

## 2024-05-01 PROCEDURE — 73130 X-RAY EXAM OF HAND: CPT | Performed by: NURSE PRACTITIONER

## 2024-05-01 PROCEDURE — 73110 X-RAY EXAM OF WRIST: CPT | Performed by: NURSE PRACTITIONER

## 2024-05-01 PROCEDURE — 99213 OFFICE O/P EST LOW 20 MIN: CPT | Performed by: NURSE PRACTITIONER
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
## 2024-05-01 NOTE — ED INITIAL ASSESSMENT (HPI)
Pt states Monday fell down 3 steps and tried to catch her self against a fence and her left hand bent backwards. Pt states has been having swelling in pain in hand and wrist since. Pt states having a sharp pain on her left thumb. Pt states today having some radiating.

## 2024-05-03 ENCOUNTER — TELEMEDICINE (OUTPATIENT)
Facility: CLINIC | Age: 49
End: 2024-05-03
Payer: COMMERCIAL

## 2024-05-03 DIAGNOSIS — M25.532 LEFT WRIST PAIN: Primary | ICD-10-CM

## 2024-05-03 DIAGNOSIS — R73.03 PREDIABETES: ICD-10-CM

## 2024-05-03 PROCEDURE — 99213 OFFICE O/P EST LOW 20 MIN: CPT | Performed by: FAMILY MEDICINE

## 2024-05-03 RX ORDER — METFORMIN HYDROCHLORIDE 500 MG/1
500 TABLET, EXTENDED RELEASE ORAL DAILY
Qty: 90 TABLET | Refills: 1 | Status: SHIPPED | OUTPATIENT
Start: 2024-05-03

## 2024-05-03 RX ORDER — IBUPROFEN 800 MG/1
800 TABLET ORAL 3 TIMES DAILY
Qty: 270 TABLET | Refills: 3 | Status: SHIPPED | OUTPATIENT
Start: 2024-05-03 | End: 2025-04-28
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
## 2024-05-31 ENCOUNTER — TELEPHONE (OUTPATIENT)
Dept: SURGERY | Facility: CLINIC | Age: 49
End: 2024-05-31

## 2024-05-31 DIAGNOSIS — K64.2 GRADE III HEMORRHOIDS: Primary | ICD-10-CM

## 2024-05-31 NOTE — PRE-SEDATION ASSESSMENT
Physician Pre-Sedation Assessment    Pre-Sedation Assessment:    Sedation History: {Sedation History:5012::\"Airway Assessed\"}    Cardiac: normal S1, S2  Respiratory: breath sounds clear bilaterally   Abdomen: soft, BS (+), non-tender    ASA Classification: {ASA Classification:5017}    Plan: IV Sedation

## 2024-06-02 ENCOUNTER — HOSPITAL ENCOUNTER (OUTPATIENT)
Age: 49
Discharge: HOME | End: 2024-06-02
Payer: COMMERCIAL

## 2024-06-02 VITALS
RESPIRATION RATE: 18 BRPM | OXYGEN SATURATION: 100 % | TEMPERATURE: 99 F | HEART RATE: 69 BPM | SYSTOLIC BLOOD PRESSURE: 135 MMHG | DIASTOLIC BLOOD PRESSURE: 87 MMHG

## 2024-06-02 DIAGNOSIS — N76.0 ACUTE VAGINITIS: Primary | ICD-10-CM

## 2024-06-02 RX ORDER — FLUCONAZOLE 150 MG/1
150 TABLET ORAL ONCE
Qty: 1 TABLET | Refills: 0 | Status: SHIPPED | OUTPATIENT
Start: 2024-06-02 | End: 2024-06-02

## 2024-06-02 NOTE — ED INITIAL ASSESSMENT (HPI)
Pt with vaginal irritation and white discharge x2 days; denies fever, pelvic pain, dysuria, or hematuria
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
## 2024-06-15 ENCOUNTER — LAB ENCOUNTER (OUTPATIENT)
Dept: LAB | Facility: HOSPITAL | Age: 49
End: 2024-06-15
Attending: FAMILY MEDICINE

## 2024-06-17 NOTE — DISCHARGE INSTRUCTIONS
Ice pack as needed.  Remove anal packing and start warm baths tomorrow and twice a day  Ibuprofen 600 mg to 800 mg every 6 hours for pain, you may use ibuprofen or Norco in between  Take Colace 100 mg twice a day as a stool softener.  If no bowel movement for 48 hours take milk of magnesia or MiraLAX.  Wear a pad or gauze in your undershorts and expected bleeding and drainage for 2 weeks  Follow-up with Juan Ramon Franks in 2 weeks     HOME INSTRUCTIONS  [facility] HOME CARE INSTRUCTIONS: POST-OP ANESTHESIA  The medication that you received for sedation or general anesthesia can last up to 24 hours. Your judgment and reflexes may be altered, even if you feel like your normal self.      We Recommend:   Do not drive any motor vehicle or bicycle   Avoid mowing the lawn, playing sports, or working with power tools/applicances (power saws, electric knives or mixers)   That you have someone stay with you on your first night home   Do not drink alcohol or take sleeping pills or tranquilizers   Do not sign legal documents within 24 hours of your procedure   If you had a nerve block for your surgery, take extra care not to put any pressure on your arm or hand for 24 hours    It is normal:  For you to have a sore throat if you had a breathing tube during surgery (while you were asleep!). The sore throat should get better within 48 hours. You can gargle with warm salt water (1/2 tsp in 4 oz warm water) or use a throat lozenge for comfort  To feel muscle aches or soreness especially in the abdomen, chest or neck. The achy feeling should go away in the next 24 hours  To feel weak, sleepy or \"wiped out\". Your should start feeling better in the next 24 hours.   To experience mild discomforts such as sore lip or tongue, headache, cramps, gas pains or a bloated feeling in your abdomen.   To experience mild back pain or soreness for a day or two if you had spinal or epidural anesthesia.   If you had laparoscopic surgery, to feel shoulder pain  or discomfort on the day of surgery.   For some patients to have nausea after surgery/anesthesia    If you feel nausea or experience vomiting:   Try to move around less.   Eat less than usual or drink only liquids until the next morning   Nausea should resolve in about 24 hours    If you have a problem when you are at home:    Call your surgeons office   Discharge Instructions: After Your Surgery  You’ve just had surgery. During surgery, you were given medicine called anesthesia to keep you relaxed and free of pain. After surgery, you may have some pain or nausea. This is common. Here are some tips for feeling better and getting well after surgery.   Going home  Your healthcare provider will show you how to take care of yourself when you go home. They'll also answer your questions. Have an adult family member or friend drive you home. For the first 24 hours after your surgery:   Don't drive or use heavy equipment.  Don't make important decisions or sign legal papers.  Take medicines as directed.  Don't drink alcohol.  Have someone stay with you, if needed. They can watch for problems and help keep you safe.  Be sure to go to all follow-up visits with your healthcare provider. And rest after your surgery for as long as your provider tells you to.   Coping with pain  If you have pain after surgery, pain medicine will help you feel better. Take it as directed, before pain becomes severe. Also, ask your healthcare provider or pharmacist about other ways to control pain. This might be with heat, ice, or relaxation. And follow any other instructions your surgeon or nurse gives you.      Stay on schedule with your medicine.     Tips for taking pain medicine  To get the best relief possible, remember these points:   Pain medicines can upset your stomach. Taking them with a little food may help.  Most pain relievers taken by mouth need at least 20 to 30 minutes to start to work.  Don't wait till your pain becomes severe  before you take your medicine. Try to time your medicine so that you can take it before starting an activity. This might be before you get dressed, go for a walk, or sit down for dinner.  Constipation is a common side effect of some pain medicines. Call your healthcare provider before taking any medicines such as laxatives or stool softeners to help ease constipation. Also ask if you should skip any foods. Drinking lots of fluids and eating foods such as fruits and vegetables that are high in fiber can also help. Remember, don't take laxatives unless your surgeon has prescribed them.  Drinking alcohol and taking pain medicine can cause dizziness and slow your breathing. It can even be deadly. Don't drink alcohol while taking pain medicine.  Pain medicine can make you react more slowly to things. Don't drive or run machinery while taking pain medicine.  Your healthcare provider may tell you to take acetaminophen to help ease your pain. Ask them how much you're supposed to take each day. Acetaminophen or other pain relievers may interact with your prescription medicines or other over-the-counter (OTC) medicines. Some prescription medicines have acetaminophen and other ingredients in them. Using both prescription and OTC acetaminophen for pain can cause you to accidentally overdose. Read the labels on your OTC medicines with care. This will help you to clearly know the list of ingredients, how much to take, and any warnings. It may also help you not take too much acetaminophen. If you have questions or don't understand the information, ask your pharmacist or healthcare provider to explain it to you before you take the OTC medicine.   Managing nausea  Some people have an upset stomach (nausea) after surgery. This is often because of anesthesia, pain, or pain medicine, less movement of food in the stomach, or the stress of surgery. These tips will help you handle nausea and eat healthy foods as you get better. If you  were on a special food plan before surgery, ask your healthcare provider if you should follow it while you get better. Check with your provider on how your eating should progress. It may depend on the surgery you had. These general tips may help:   Don't push yourself to eat. Your body will tell you when to eat and how much.  Start off with clear liquids and soup. They're easier to digest.  Next try semi-solid foods as you feel ready. These include mashed potatoes, applesauce, and gelatin.  Slowly move to solid foods. Don’t eat fatty, rich, or spicy foods at first.  Don't force yourself to have 3 large meals a day. Instead eat smaller amounts more often.  Take pain medicines with a small amount of solid food, such as crackers or toast. This helps prevent nausea.  When to call your healthcare provider  Call your healthcare provider right away if you have any of these:   You still have too much pain, or the pain gets worse, after taking the medicine. The medicine may not be strong enough. Or there may be a complication from the surgery.  You feel too sleepy, dizzy, or groggy. The medicine may be too strong.  Side effects such as nausea or vomiting. Your healthcare provider may advise taking other medicines to .  Skin changes such as rash, itching, or hives. This may mean you have an allergic reaction. Your provider may advise taking other medicines.  The incision looks different (for instance, part of it opens up).  Bleeding or fluid leaking from the incision site, and weren't told to expect that.  Fever of 100.4°F (38°C) or higher, or as directed by your provider.  Call 911  Call 911 right away if you have:   Trouble breathing  Facial swelling    If you have obstructive sleep apnea   You were given anesthesia medicine during surgery to keep you comfortable and free of pain. After surgery, you may have more apnea spells because of this medicine and other medicines you were given. The spells may last longer than  normal.    At home:  Keep using the continuous positive airway pressure (CPAP) device when you sleep. Unless your healthcare provider tells you not to, use it when you sleep, day or night. CPAP is a common device used to treat obstructive sleep apnea.  Talk with your provider before taking any pain medicine, muscle relaxants, or sedatives. Your provider will tell you about the possible dangers of taking these medicines.  Contact your provider if your sleeping changes a lot even when taking medicines as directed.  [employer] last reviewed this educational content on 10/1/2021  © 7594-8688 [employer]. All rights reserved. This information is not intended as a substitute for professional medical care. Always follow your healthcare professional's instructions.        Discharge Instructions: After Your Surgery  You’ve just had surgery. During surgery, you were given medicine called anesthesia to keep you relaxed and free of pain. After surgery, you may have some pain or nausea. This is common. Here are some tips for feeling better and getting well after surgery.   Going home  Your healthcare provider will show you how to take care of yourself when you go home. They'll also answer your questions. Have an adult family member or friend drive you home. For the first 24 hours after your surgery:   Don't drive or use heavy equipment.  Don't make important decisions or sign legal papers.  Take medicines as directed.  Don't drink alcohol.  Have someone stay with you, if needed. They can watch for problems and help keep you safe.  Be sure to go to all follow-up visits with your healthcare provider. And rest after your surgery for as long as your provider tells you to.   Coping with pain  If you have pain after surgery, pain medicine will help you feel better. Take it as directed, before pain becomes severe. Also, ask your healthcare provider or pharmacist about other ways to control pain. This might be with heat,  ice, or relaxation. And follow any other instructions your surgeon or nurse gives you.      Stay on schedule with your medicine.     Tips for taking pain medicine  To get the best relief possible, remember these points:   Pain medicines can upset your stomach. Taking them with a little food may help.  Most pain relievers taken by mouth need at least 20 to 30 minutes to start to work.  Don't wait till your pain becomes severe before you take your medicine. Try to time your medicine so that you can take it before starting an activity. This might be before you get dressed, go for a walk, or sit down for dinner.  Constipation is a common side effect of some pain medicines. Call your healthcare provider before taking any medicines such as laxatives or stool softeners to help ease constipation. Also ask if you should skip any foods. Drinking lots of fluids and eating foods such as fruits and vegetables that are high in fiber can also help. Remember, don't take laxatives unless your surgeon has prescribed them.  Drinking alcohol and taking pain medicine can cause dizziness and slow your breathing. It can even be deadly. Don't drink alcohol while taking pain medicine.  Pain medicine can make you react more slowly to things. Don't drive or run machinery while taking pain medicine.  Your healthcare provider may tell you to take acetaminophen to help ease your pain. Ask them how much you're supposed to take each day. Acetaminophen or other pain relievers may interact with your prescription medicines or other over-the-counter (OTC) medicines. Some prescription medicines have acetaminophen and other ingredients in them. Using both prescription and OTC acetaminophen for pain can cause you to accidentally overdose. Read the labels on your OTC medicines with care. This will help you to clearly know the list of ingredients, how much to take, and any warnings. It may also help you not take too much acetaminophen. If you have  questions or don't understand the information, ask your pharmacist or healthcare provider to explain it to you before you take the OTC medicine.   Managing nausea  Some people have an upset stomach (nausea) after surgery. This is often because of anesthesia, pain, or pain medicine, less movement of food in the stomach, or the stress of surgery. These tips will help you handle nausea and eat healthy foods as you get better. If you were on a special food plan before surgery, ask your healthcare provider if you should follow it while you get better. Check with your provider on how your eating should progress. It may depend on the surgery you had. These general tips may help:   Don't push yourself to eat. Your body will tell you when to eat and how much.  Start off with clear liquids and soup. They're easier to digest.  Next try semi-solid foods as you feel ready. These include mashed potatoes, applesauce, and gelatin.  Slowly move to solid foods. Don’t eat fatty, rich, or spicy foods at first.  Don't force yourself to have 3 large meals a day. Instead eat smaller amounts more often.  Take pain medicines with a small amount of solid food, such as crackers or toast. This helps prevent nausea.  When to call your healthcare provider  Call your healthcare provider right away if you have any of these:   You still have too much pain, or the pain gets worse, after taking the medicine. The medicine may not be strong enough. Or there may be a complication from the surgery.  You feel too sleepy, dizzy, or groggy. The medicine may be too strong.  Side effects such as nausea or vomiting. Your healthcare provider may advise taking other medicines to .  Skin changes such as rash, itching, or hives. This may mean you have an allergic reaction. Your provider may advise taking other medicines.  The incision looks different (for instance, part of it opens up).  Bleeding or fluid leaking from the incision site, and weren't told to expect  that.  Fever of 100.4°F (38°C) or higher, or as directed by your provider.  Call 911  Call 911 right away if you have:   Trouble breathing  Facial swelling    If you have obstructive sleep apnea   You were given anesthesia medicine during surgery to keep you comfortable and free of pain. After surgery, you may have more apnea spells because of this medicine and other medicines you were given. The spells may last longer than normal.    At home:  Keep using the continuous positive airway pressure (CPAP) device when you sleep. Unless your healthcare provider tells you not to, use it when you sleep, day or night. CPAP is a common device used to treat obstructive sleep apnea.  Talk with your provider before taking any pain medicine, muscle relaxants, or sedatives. Your provider will tell you about the possible dangers of taking these medicines.  Contact your provider if your sleeping changes a lot even when taking medicines as directed.  [employer] last reviewed this educational content on 10/1/2021  © 9327-9308 [employer]. All rights reserved. This information is not intended as a substitute for professional medical care. Always follow your healthcare professional's instructions.

## 2024-06-17 NOTE — TELEPHONE ENCOUNTER
Patient called stating she will fax Disability forms today to forms department. Fax # [phone] was provided.  Details were provided.     Type of Leave: Disability  Reason for Leave: Sx  Start date of leave: Start: 6/17/24  End: 8/5/24  Return to work 8/6/24  How much time needed?:   Forms Due Date:  Was Fee and Turnaround info Given?: YES      PATIENT REQUEST FORMS UPLOADED TO [employer] WHEN COMPLETED !
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
## 2024-06-18 NOTE — OPERATIVE REPORT
[facility]    PATIENT'S NAME: CLEMENTE MILLER   ATTENDING PHYSICIAN: Curtis Ziegler MD   OPERATING PHYSICIAN: Curtis Ziegler MD   PATIENT ACCOUNT#:   [id number]    LOCATION:  [address]  MEDICAL RECORD #:   [id number]       YOB: 1975  ADMISSION DATE:       06/18/2024      OPERATION DATE:  06/18/2024    OPERATIVE REPORT      PREOPERATIVE DIAGNOSIS:  Prolapsed complex hemorrhoids.  POSTOPERATIVE DIAGNOSIS:  Prolapsed complex hemorrhoids.  PROCEDURE:  Complex hemorrhoidectomy x3, anoscopy.     ASSISTANT:  LILLIAN Carter    ESTIMATED BLOOD LOSS:  10 mL.    COMPLICATIONS:  None.    ANESTHESIA:  General.    DISPOSITION:  To Recovery, tolerated well.    INDICATIONS:  Patient is 49, with symptomatic hemorrhoids.  Detailed informed consent obtained.      OPERATIVE TECHNIQUE:  She was taken to surgery, placed in lithotomy, prepped and draped in usual sterile fashion.  Anoscope was inserted.  Three areas of concern were identified.  They were grasped with an Allis clamp.  Elliptical incision was made.  The external and internal component dissected from the sphincter mechanism using LigaSure.  Mucosa and skin approximated using running 2-0 chromic.  This was performed x3.  Marcaine infiltrated for local block.  Dibucaine and Gelfoam were placed.  She tolerated this well, sent to Recovery in stable condition.     Dictated By Curtis Ziegler MD  d: 06/18/2024 10:43:55  t: 06/18/2024 15:51:32  Job [id number]  GL/    cc: Michelle Andrade DO
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
## 2024-06-18 NOTE — ANESTHESIA PROCEDURE NOTES
Airway  Date/Time: 6/18/2024 10:15 AM  Urgency: elective    Airway not difficult    General Information and Staff    Patient location during procedure: OR  Anesthesiologist: Chanelle Knowles MD, PhD  Performed: anesthesiologist   Performed by: Chanelle Knowles MD, PhD  Authorized by: Chanelle Knowles MD, PhD      Indications and Patient Condition  Indications for airway management: anesthesia  Sedation level: deep  Preoxygenated: yes  Patient position: sniffing  Mask difficulty assessment: 1 - vent by mask    Final Airway Details  Final airway type: supraglottic airway      Successful airway: classic  Size 5       Number of attempts at approach: 1

## 2024-06-18 NOTE — INTERVAL H&P NOTE
Pre-op Diagnosis: Hemorrhoids, unspecified hemorrhoid type [K64.9]  Rectal bleeding [K62.5]    The above referenced H&P was reviewed by Curtis Ziegler MD on 6/18/2024, the patient was examined and no significant changes have occurred in the patient's condition since the H&P was performed.  I discussed with the patient and/or legal representative the potential benefits, risks and side effects of this procedure; the likelihood of the patient achieving goals; and potential problems that might occur during recuperation.  I discussed reasonable alternatives to the procedure, including risks, benefits and side effects related to the alternatives and risks related to not receiving this procedure.  We will proceed with procedure as planned.

## 2024-06-18 NOTE — ANESTHESIA POSTPROCEDURE EVALUATION
Patient: Christy LITTLE Nielsen    Procedure Summary       Date: 06/18/24 Room / Location: [facility] MAIN OR 03 / [facility] MAIN OR    Anesthesia Start: 1006 Anesthesia Stop: 1056    Procedure: Hemorrhoidectomy (Anus) Diagnosis:       Hemorrhoids, unspecified hemorrhoid type      Rectal bleeding      (Hemorrhoids, unspecified hemorrhoid type [K64.9]Rectal bleeding [K62.5])    Surgeons: Curtis Ziegler MD Anesthesiologist: Chanelle Knowles MD, PhD    Anesthesia Type: general ASA Status: 2            Anesthesia Type: No value filed.    Vitals Value Taken Time   /75 06/18/24 1056   Temp 98.0 06/18/24 1056   Pulse 93 06/18/24 1056   Resp 14 06/18/24 1056   SpO2 95 % 06/18/24 1056   Vitals shown include unfiled device data.    [facility] AN Post Evaluation:   Patient Evaluated in PACU  Patient Participation: complete - patient participated  Level of Consciousness: awake and alert  Pain Score: 2  Pain Management: adequate  Airway Patency:patent  Yes    Nausea/Vomiting: none  Cardiovascular Status: acceptable  Respiratory Status: acceptable  Postoperative Hydration acceptable      Chanelle Knowles MD, PhD  6/18/2024 10:56 AM

## 2024-06-21 NOTE — TELEPHONE ENCOUNTER
Patient states that she needs to schedule a wound check to see physicians assistant Juan Ramon Franks in 2 weeks. Patient states that she had her procedure done 6/18/2024.

## 2024-07-02 NOTE — TELEPHONE ENCOUNTER
Disability form received in forms department and logged for processing. No Release of Information - patient sent [employer] message.

## 2024-07-03 NOTE — PROGRESS NOTES
S:  Christy Nielsen is a 49 year old female sp hemorrhoidectomy.   Doing well, no fevers, no chills, tolerating a general diet, generally normal bowel movements, no calf tenderness nor lower extremity edema, no shortness of breath, no chest pain.   Some discomfort around the area, keeping bowel movements soft.    O:  Wt 208 lb (94.3 kg)   LMP 05/20/2024   BMI 33.57 kg/m²   GEN:  No acute distress  L: nonlabored respirations  H: reg rate  Abd:  Soft, NT,ND.  Skin: Some mild swelling around incisions. Incisions open, but appear healthy and clean with granulation tissue.   Extr: No edema, no calf tenderness    Path:  Reviewed w pt    Assessment   1. Encounter for postoperative care        Doing well post op  Continue to keep bowel movements soft.  Continue to keep incision clean and dry.  Maintain a healthy diet.  Maintain good hydration.  F/u prn.         Juan Ramon Franks PA-C

## 2024-07-11 NOTE — TELEPHONE ENCOUNTER
Dr. Ziegler,   This patient has reached out.  Her forms have been completed by the forms department.  If you haven't signed off please do at your earliest convenience.  Thank you, Mayte

## 2024-07-11 NOTE — TELEPHONE ENCOUNTER
Patient called to check on status of forms, informed patient the request is pending approval from /rebecca caal. Patient stated she will call MD office for forms department get a sooner reply.

## 2024-07-11 NOTE — TELEPHONE ENCOUNTER
Juan Ramon Franks PA-C,    Patient is seeking Disability for her recent surgery & recovery.    Patient is requesting to be off of work from 6/17/24-8/5/24, patient to return to work on 8/6/24.    Do you support?    Thank you,    Yajaira

## 2024-07-22 NOTE — TELEPHONE ENCOUNTER
Patient called requesting information on status of forms. Informed patient provider was tasked and response was just rec'd today. Informed patient she will be notified once forms are signed. Patient verbalized understanding. Rep notified.

## 2024-07-24 NOTE — TELEPHONE ENCOUNTER
Disability forms revised & scanned into patient's chart.    Forms uploaded to [employer] as well.

## 2024-07-24 NOTE — TELEPHONE ENCOUNTER
Patient called, states there is a need for a few things to be revised on forms, adv Revision agent, and set next steps expectation w/ patient for follow up w/ updated forms, patient expressed understanding.

## 2024-07-24 NOTE — TELEPHONE ENCOUNTER
Disability forms completed & uploaded to [employer], per patient's request.    [employer] message sent to patient.

## 2024-07-31 NOTE — PROGRESS NOTES
Established Patient Follow-up      7/31/2024    Christy LITTLE Nielsen 49 year old      HPI  Patient with open wounds post hemorrhoidectomy presents for evaluation.  Patient works as a .        Exam  Postop healing after hemorrhoidectomy.  90% healed.  Still island of granulation tissue.      Assessment/Plan  Assessment       Open wounds post hemorrhoidectomy  Light duty at work for 6 hours additional 2 weeks     Curtis Ziegler MD

## 2024-11-11 NOTE — PROGRESS NOTES
Subjective:   Christy Nielsen is a 49 year old female who presents for Medication Follow-Up     Patient presents for PreDM follow up. Doing well on metformin.     Patient also has chronic back pain. Is following with massage therapist. Is wondering if [facility] has chiropractor. Is open to acupuncture.     History/Other:    Chief Complaint Reviewed and Verified  No Further Nursing Notes to   Review  Tobacco Reviewed  Allergies Reviewed  Medications Reviewed    Problem List Reviewed  Medical History Reviewed  Surgical History   Reviewed  OB Status Reviewed  Family History Reviewed  Social History   Reviewed         Tobacco:  She has never smoked tobacco.    Current Outpatient Medications   Medication Sig Dispense Refill    ibuprofen 800 MG Oral Tab Take 1 tablet (800 mg total) by mouth 3 (three) times daily. 270 tablet 3    metFORMIN  MG Oral Tablet 24 Hr Take 1 tablet (500 mg total) by mouth daily. 90 tablet 1    mometasone furoate 50 MCG/ACT Nasal Suspension 1 spray by Nasal route daily. 17 g 1    montelukast 10 MG Oral Tab Take 1 tablet (10 mg total) by mouth nightly. 90 tablet 3    docusate sodium 100 MG Oral Cap Take 1 capsule (100 mg total) by mouth 2 (two) times daily. (Patient not taking: Reported on 11/11/2024) 30 capsule 0    Biotin 1000 MCG Oral Tab Take 1,000 mcg by mouth daily. (Patient not taking: Reported on 11/11/2024)           Review of Systems:  Review of Systems   Constitutional: Negative.    Respiratory: Negative.     Cardiovascular: Negative.    Gastrointestinal: Negative.    Skin: Negative.    Neurological: Negative.          Objective:   /78   Pulse 70   Ht 5' 6\" (1.676 m)   Wt 210 lb (95.3 kg)   LMP 05/20/2024   SpO2 100%   BMI 33.89 kg/m²  Estimated body mass index is 33.89 kg/m² as calculated from the following:    Height as of this encounter: 5' 6\" (1.676 m).    Weight as of this encounter: 210 lb (95.3 kg).    Wt Readings from Last 6 Encounters:   11/11/24 210 lb  (95.3 kg)   07/03/24 208 lb (94.3 kg)   06/18/24 208 lb (94.3 kg)   04/25/24 211 lb (95.7 kg)   04/10/24 204 lb (92.5 kg)   11/10/23 204 lb (92.5 kg)       Physical Exam  Vitals and nursing note reviewed.   Constitutional:       General: She is not in acute distress.     Appearance: Normal appearance.   HENT:      Head: Normocephalic and atraumatic.   Eyes:      General:         Right eye: No discharge.         Left eye: No discharge.      Comments: Extraocular eye movements grossly intact   Pulmonary:      Effort: Pulmonary effort is normal.   Musculoskeletal:      Comments: Normal gait   Skin:     Findings: No rash.   Neurological:      General: No focal deficit present.      Mental Status: She is alert. Mental status is at baseline.   Psychiatric:         Mood and Affect: Mood normal.         Behavior: Behavior normal.         Assessment & Plan:   1. Prediabetes (Primary)  -     Hemoglobin A1C; Future; Expected date: 11/11/2024    -ordered updated A1c. If A1c stable or down trending will discontinue metformin as has not been helpful with weight. If A1c up trending will recommend patient stay on metformin    2. Visit for screening mammogram  -     3D Mammogram Digital Screen, Bilateral (CPT=77067/14022); Future; Expected date: 11/11/2024    -due ordered    3. Chronic bilateral low back pain without sciatica  -     Integrative Medicine Physician Consultation ([place]) - Internal Referral    -patient open to trialing acupuncture. Placed referral    4. Need for vaccination  -     TdaP (Boostrix) Vaccine (> 7 Y)    -administered by staff      Return in about 5 months (around 4/11/2025) for Annual wellness.    Yandy Velez MD, 11/11/2024, 11:41 AM

## 2025-01-16 NOTE — PROGRESS NOTES
Subjective:   Christy Nielsen is a 49 year old female who presents for Fungus Nails     Patient presents for longstanding onychomycosis right great toe. has had this toenail removed in the past.  Toenail has regrown and is thickened and brittle.  Patient also thinks has athlete's foot.      History/Other:    Chief Complaint Reviewed and Verified  No Further Nursing Notes to   Review  Tobacco Reviewed  Allergies Reviewed  Medications Reviewed    Problem List Reviewed  Medical History Reviewed  Surgical History   Reviewed  OB Status Reviewed  Family History Reviewed  Social History   Reviewed         Tobacco:  She has never smoked tobacco.    Current Outpatient Medications   Medication Sig Dispense Refill    terbinafine 250 MG Oral Tab Take 1 tablet (250 mg total) by mouth daily. 90 tablet 0    ketoconazole 2 % External Cream Apply 1 Application topically 2 (two) times daily. 60 g 0    docusate sodium 100 MG Oral Cap Take 1 capsule (100 mg total) by mouth 2 (two) times daily. 30 capsule 0    Biotin 1000 MCG Oral Tab Take 1,000 mcg by mouth daily.      ibuprofen 800 MG Oral Tab Take 1 tablet (800 mg total) by mouth 3 (three) times daily. 270 tablet 3    mometasone furoate 50 MCG/ACT Nasal Suspension 1 spray by Nasal route daily. 17 g 1    montelukast 10 MG Oral Tab Take 1 tablet (10 mg total) by mouth nightly. 90 tablet 3         Review of Systems:  Review of Systems   Constitutional: Negative.    Respiratory: Negative.     Cardiovascular: Negative.    Gastrointestinal: Negative.    Skin:         Toe nail discoloration   Neurological: Negative.          Objective:   /88   Pulse 82   Ht 5' 6\" (1.676 m)   Wt 209 lb (94.8 kg)   LMP 01/08/2025 (Approximate)   SpO2 98%   BMI 33.73 kg/m²  Estimated body mass index is 33.73 kg/m² as calculated from the following:    Height as of this encounter: 5' 6\" (1.676 m).    Weight as of this encounter: 209 lb (94.8 kg).  Physical Exam  Vitals and nursing note  reviewed.   Constitutional:       General: She is not in acute distress.     Appearance: Normal appearance.   HENT:      Head: Normocephalic and atraumatic.   Eyes:      General:         Right eye: No discharge.         Left eye: No discharge.      Comments: Extraocular eye movements grossly intact   Pulmonary:      Effort: Pulmonary effort is normal.   Musculoskeletal:      Comments: Normal gait   Skin:     Findings: No rash.      Comments: + Right toenail thickening and discoloration   Neurological:      General: No focal deficit present.      Mental Status: She is alert. Mental status is at baseline.   Psychiatric:         Mood and Affect: Mood normal.         Behavior: Behavior normal.         Assessment & Plan:   1. Onychomycosis (Primary)  -     Terbinafine HCl; Take 1 tablet (250 mg total) by mouth daily.  Dispense: 90 tablet; Refill: 0  -     Podiatry Referral  -     Comp Metabolic Panel (14); Standing    Patient is amendable to terbinafine trial.  Patient is aware of potential effect on liver.  Ordered monthly monitoring CMP's to monitor liver while on terbinafine.  In case oral terbinafine unsuccessful placed referral to podiatry for potential nail removal    2. Tinea pedis of right foot  -     Ketoconazole; Apply 1 Application topically 2 (two) times daily.  Dispense: 60 g; Refill: 0    Sent in ketoconazole as written      Return in about 3 months (around 4/16/2025).    Yandy Velez MD, 1/16/2025, 4:37 PM

## 2025-01-17 NOTE — TELEPHONE ENCOUNTER
Approved    Prior authorization approved  Payer: [facility] [employer] Case ID: [id number]    [phone]    [phone]  Note from payer: Your PA request has been approved.  Additional information will be provided in the approval communication. (Message [phone])  Approval Details    Authorized from January 17, 2025 to April 17, 2025      Patient notified via [employer].

## 2025-04-16 NOTE — ED INITIAL ASSESSMENT (HPI)
Patient arrives to the ED ambulatory from IC, w/ c/o burning w/ urination, lower abd and pelvic pain that started 3 days ago. Denies bleeding/discharge.
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
## 2025-04-17 NOTE — TELEPHONE ENCOUNTER
RN called patient. Patient went to ED for pelvic pain, and pain with urination. Awaiting for her partner to come home since he will  her pain medication from the pharmacy. Currently in a lot of pain still. Was told at the ED to follow up with her OBGYN since they believed pelvic pain is related to her fibroids. Next available appt with Dr. Tamayo is 4/25. RN informed patient that RN will consult team and let her know the recommendations. Patient verbalized understanding.          Per ED notes from 4/16:  Pt updated with results, all questions answered. Still in pain. VSS. Discussed incidental finding of occult hematuria and importance of recheck with PMD To ensure resolves, otherwise will need outpatient urology. Advised f/u with OBGYN Given recurrent fibroids likely causing her pelvic pain. Advised return if new/worsening sx. Will give limited trial of norco to use in addition to nsaids and tylenol for pain, advised on side effects, appropriate use, colace to use if constipation. Return if new/worsening sx occur right away. Pt comfortable with DC plan.

## 2025-04-17 NOTE — TELEPHONE ENCOUNTER
Patient is calling requesting to speak to RN, per patient was in the ED last night and would like to discuss fibroids and did not state more information. Please follow up with patient.
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
## 2025-04-17 NOTE — TELEPHONE ENCOUNTER
RN consulted Dr. Tamayo who recommended next available appt with her if fine. RN called patient and informed. Appt schedule for 4/25. Rn informed patient of pain were to become unrelieved with medication or worsening of symptoms to then report to ED. Patient verbalized understanding

## 2025-04-17 NOTE — DISCHARGE INSTRUCTIONS
Thank you for seeking care at [facility] Emergency Department  You have been seen and evaluated for abdominal pain and discomfort.    In the emergency department, you had labs, urine, CT scan and swabs done   Your testing did not show severe findings, except as noted: multiple uterine fibroids, microscopic blood in the urine     Read all instructions provided.    Remember, your care process does not end after your visit today. Please follow-up with your doctor within 1-2 days for a follow-up visit to ensure your symptoms are improving, to see if you need any further evaluation/testing, or to evaluate for any alternate diagnoses.     Return to the emergency department right away if you develop severe abdominal pain, severe nausea and vomiting to the point where you are unable to keep down fluids, if you develop chest pain or difficulty breathing, blood in your stool, dizziness or fainting, or if you develop any other new or concerning symptoms as these could be signs of more serious medical illness. Try to stay well hydrated.
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
## 2025-05-12 NOTE — TELEPHONE ENCOUNTER
Discussed patient situation with Dr. Dunham. Can potentially do a joint case, but cannot accommodate this within the next month. Also, noted his portion of the procedure would likely be self pay.  Called patient to relay the above. Patient verbalized understanding and would still like to proceed. Made her aware that appointments are scheduling moths away at this time. Offered to transfer her to  to reschedule. Patient will call back when she is able as she is at work.

## 2025-05-12 NOTE — TELEPHONE ENCOUNTER
Called pt LVM, to call our office  regarding her message that left, need to  explain that Dr. Lopez's portion would likely be self pay, and we cannot accommodate surgery in the next month as we are a cancer clinic.

## 2025-06-03 NOTE — PROGRESS NOTES
Christy Nielsen is a 50 year old female who presents today in follow-up she will be undergoing open hysterectomy for fibroids.  She complains of a painful scar with contour dramality on her right abdomen.  She is interested in concomitant scar revision.       Physical Examination:  Abdomen: A well-healed low transverse scar is noted.  The scar is noted to be high riding on the right side in order to have significant spreading.  There is a vertical extension which measured 5 312 cm in length in the right paramedian region.  A moderate depression is noted underlying the right lateral aspect of the scar.    Assessment and Plan:  We discussed the option of scar revision at the time of her patient's hysterectomy.  The nature of the procedure was reviewed with the patient.  We discussed the risk of surgery including but not limited to bleeding, infection, scarring, delayed wound healing, for persistent deformity, hypertrophic scar or keloid, injury to adjacent structures, and need for further surgery.  We discussed expected postoperative course including need for negative pressure incisional dressing, drains, activity limitation, and compression.  Multiple questions were answered the patient and her  satisfaction.  No guarantees as to outcome were offered.  The patient expresses understanding and wishes to proceed pending insurance approval

## 2025-06-12 NOTE — PROGRESS NOTES
Subjective:   Christy Nielsen is a 50 year old female who presents for Pain     Patient has had pain in hamstrings and glutes when sitting for years. However now for the past 2 weeks ankles are also now hurting. Patient is a . Has also been having pain in left knee for years. Now for the past 2 weeks is also having right knee pain. Feels like bone on bone pain. Patient also notes that is having right wrist pain also for 2 weeks. Hurst when flexing and external rotation. No care accidents. No falls. No family history of RA. Does have xrays of bilateral knees from 2020 that showed mild arthritis.     Of note was also seen in ER on 4/16/25 for pelvic pain. CT scan notable for large fibroid. Urinalysis with blood. Does have upcoming hysterectomy planned for fibroids with Dr Tamayo. Date not yet set. Patient is suspicious is in perimenopause. Has not had a period in months. Will sometimes spot.         History/Other:    Chief Complaint Reviewed and Verified  No Further Nursing Notes to   Review  Tobacco Reviewed  Allergies Reviewed  Medications Reviewed    Problem List Reviewed  Medical History Reviewed  Surgical History   Reviewed  OB Status Reviewed  Family History Reviewed  Social History   Reviewed         Tobacco:  She has never smoked tobacco.    Current Medications[1]      Review of Systems:  Review of Systems   Constitutional: Negative.    HENT: Negative.     Eyes: Negative.    Respiratory: Negative.     Cardiovascular: Negative.    Gastrointestinal: Negative.    Genitourinary: Negative.    Musculoskeletal:  Positive for arthralgias (knees, ankles, and right wrist) and myalgias.   Skin: Negative.    Neurological: Negative.    Psychiatric/Behavioral: Negative.           Objective:   /78   Pulse 58   Ht 5' 6\" (1.676 m)   Wt 202 lb (91.6 kg)   LMP 03/01/2025 (Approximate)   SpO2 99%   BMI 32.60 kg/m²  Estimated body mass index is 32.6 kg/m² as calculated from the following:     Height as of this encounter: 5' 6\" (1.676 m).    Weight as of this encounter: 202 lb (91.6 kg).  Physical Exam  Vitals and nursing note reviewed.   Constitutional:       General: She is not in acute distress.     Appearance: Normal appearance.   HENT:      Head: Normocephalic and atraumatic.   Eyes:      General:         Right eye: No discharge.         Left eye: No discharge.      Comments: Extraocular eye movements grossly intact   Pulmonary:      Effort: Pulmonary effort is normal.   Musculoskeletal:      Comments: Bilateral ankles: no tenderness to palpation over medial or lateral malleolus. No tenderness to achillies tendons. No tenderness to forefoot. +mild nonpitting edema to left ankle    Right wrist: no tenderness to palpation.    Skin:     Findings: No rash.   Neurological:      General: No focal deficit present.      Mental Status: She is alert. Mental status is at baseline.   Psychiatric:         Mood and Affect: Mood normal.         Behavior: Behavior normal.           Assessment & Plan:   1. Encounter for general adult medical examination with abnormal findings (Primary)  -     Hemoglobin A1C; Future; Expected date: 06/12/2025  -     Lipid Panel; Future; Expected date: 06/12/2025    Ordered annual labs.  Lab closed by the time of patient visit patient will return for labs.  Up-to-date on colonoscopy  Up-to-date on Pap smear    2. Polyarthralgia  -     TSH W Reflex To Free T4; Future; Expected date: 06/12/2025  -     C-Reactive Protein; Future; Expected date: 06/12/2025  -     Sed Rate, Westergren (Automated); Future; Expected date: 06/12/2025    Given multiple arthralgias ordered labs as above to investigate for any potential systemic etiology/inflammatory etiology    3. Bilateral primary osteoarthritis of knee  -     XR KNEE, COMPLETE (4 OR MORE VIEWS), LEFT (CPT=73564); Future; Expected date: 06/12/2025  -     XR KNEE, COMPLETE (4 OR MORE VIEWS), RIGHT (CPT=73564); Future; Expected date:  06/12/2025    Patient to labs above ordered for polyarthralgia is also ordered updated knee x-rays to evaluate for any worsening of osteoarthritis    4. Myalgia  -     CK (Creatine Kinase) (Not Creatinine); Future; Expected date: 06/12/2025    Recommended patient institute stretching regimen for glutes and hamstrings.  Ordered CK    5. Acute bilateral ankle pain  -     Podiatry Referral    Referred to podiatry for further treatment evaluation in addition to labs ordered above for myalgias and polyarthralgias    6. Hematuria, unspecified type  Noted on urine from ER. Potentially due to fibroids. Will repeat US after hysterectomy.     7. Encounter for screening mammogram for malignant neoplasm of breast  -     [facility] GIANLUCA 2D+3D SCREENING BILAT (CPT=77067/68338); Future; Expected date: 06/12/2025    Due ordered      Return in about 1 year (around 6/12/2026), or if symptoms worsen or fail to improve.    Yandy Velez MD, 6/12/2025, 5:35 PM          [1]   Current Outpatient Medications   Medication Sig Dispense Refill    acetaminophen (TYLENOL) 325 MG Oral Tab Take 2 tablets (650 mg total) by mouth every 6 (six) hours as needed. (Patient not taking: Reported on 6/12/2025) 30 tablet 0    HYDROcodone-acetaminophen 5-325 MG Oral Tab Take 1 tablet by mouth every 6 (six) hours as needed for Pain. (Patient not taking: Reported on 6/12/2025) 10 tablet 0    terbinafine 250 MG Oral Tab Take 1 tablet (250 mg total) by mouth daily. (Patient not taking: Reported on 6/12/2025) 90 tablet 0    ketoconazole 2 % External Cream Apply 1 Application topically 2 (two) times daily. (Patient not taking: Reported on 6/12/2025) 60 g 0    docusate sodium 100 MG Oral Cap Take 1 capsule (100 mg total) by mouth 2 (two) times daily. (Patient not taking: Reported on 6/12/2025) 30 capsule 0    Biotin 1000 MCG Oral Tab Take 1,000 mcg by mouth daily. (Patient not taking: Reported on 6/12/2025)      mometasone furoate 50 MCG/ACT Nasal Suspension 1 spray by  Nasal route daily. (Patient not taking: Reported on 6/12/2025) 17 g 1    montelukast 10 MG Oral Tab Take 1 tablet (10 mg total) by mouth nightly. (Patient not taking: Reported on 6/12/2025) 90 tablet 3

## 2025-06-13 NOTE — TELEPHONE ENCOUNTER
Contacted Dr. Tamayo's office to discuss possible joint case. Explained that we cannot accommodate surgery for this patient anytime soon. Out first available is scheduling into next year. If it is appropriate for patient to wait this long to have gynecologic procedure, we are happy to coordinate a joint surgery. Asked that staff discuss with Dr. Cain, and let us know if a joint surgery is appropriate or if patient will need to have surgeries separately.

## 2025-06-13 NOTE — PATIENT INSTRUCTIONS
This is not a pre-op    Surgeon:         Dr. Gerardo Dunham                                        Tel:         [phone]                                  Fax:        [phone]    Surgery/Procedure: Abdominal scar revision, 1 hour, general anesthesia, outpatient.    Dx Code: L90.5    Hospital:  [facility]: [address] [place] [address]           [phone]  [facility]: [address] [place] [address]               [phone]    1. Someone will need to drive you to and from the hospital if your procedure is outpatient.    2.Do not drink alcohol or smoke 24 hours prior to your procedure.    3. Bring a picture ID and your insurance card.    4. You will be contacted by the hospital the day before to confirm the procedure time and location.     5. Do not take any herbal supplements or blood thinners at least one week before your procedure/surgery. This includes NSAID's (aspirin, baby aspirin, Motrin, Ibuprofen, Aleve, Advil, Naproxen, etc), Fish oil, vitamin E, turmeric, CoQ10, or green tea supplements, etc. *TYLENOL or acetaminophen is ok to take*    6. PRE-OPERATIVE TESTING: History and physical with medical clearance is REQUIRED within 30 days of the surgery date and is mandatory per Dr. Dunham. *If this is not done, your surgery will be postponed. To avoid delays with your clearance, please ensure your PCP appointment is at least 14 days prior to your surgical date.*  MEDICAL CLEARANCE WITH  ____  CBC  CMP  EKG (within 90 days)    7. If you take Coumadin, Plavix, Xarelto, or Eliquis, please contact your prescribing physician for special instructions on how long to hold. If you take insulin, contact your primary care physician for special instructions.     8. Please inform us if you start or change any medications at least one week before surgery (ex: blood thinners, weight loss medications, diabetic medications, herbal supplements, etc)    9. Does patient have diagnosis of  sleep apnea?    [   ] Yes     [   ]  No    Consent obtained  Photos taken on ______

## 2025-06-18 NOTE — TELEPHONE ENCOUNTER
Contacted patient to discuss as she did not schedule surgery with Dr. Dunham today. She explained she was unsure of the cost of the procedure. Explained that her insurance authorized the procedure, and if she is looking for an estimate for out of pocket cost she may call the price estimation line. Patient asked that this number be sent to her via [employer].   Patient also inquiring about joint case with Dr. Tamayo. Explained to patient that we cannot accommodate surgery anytime soon, and it is unclear if it would be appropriate to wait that long for her gynecologic surgery. Explained that Dr. Tamayo's office was contacted in regards to this. If Dr. Tamayo deems it acceptable to wait until next year, we can coordinate a joint surgery. If not, she will need to have her surgeries separately. Patient verbalized understanding and would like to hold off on scheduling until we hear back from her OB/GYN.  Contacted Dr. Tamayo's office again to discuss. RN unavailable to discuss. Left callback number. Also noted information may be found in TE made by this writer on 6/13.

## 2025-06-18 NOTE — TELEPHONE ENCOUNTER
Called patient to schedule surgery she said she will call her doctor and then call us back to schedule her surgery

## 2025-06-18 NOTE — TELEPHONE ENCOUNTER
Monica from Dr. Dunham is calling requesting to talk to RN or Provider, per Monica please see encounter of 6/13/25 of Monica regarding surgery. Please follow up with Moinca.     Try to transfer was unable to transfer due to RN's were busy at time of call.

## 2025-06-19 NOTE — TELEPHONE ENCOUNTER
Dr. aTmayo contacted the office to explain that both she and the patient are ok with waiting for a joint case into next year. Will notify surgery scheduling to coordinate a joint case.

## 2025-06-19 NOTE — TELEPHONE ENCOUNTER
Called patient and her symptoms are minimal. She is okay delaying surgery until 2026 unless her symptoms worsen in the next months.   I notified Monica at 's office that I was available to schedule surgery in 2026. They said they would contact me.

## 2025-07-07 NOTE — TELEPHONE ENCOUNTER
3/26/26 7:30am    ----- Message from Mary RESTREPO sent at 7/3/2025  3:34 PM CDT -----    ----- Message -----  From: Mary Piedra MD  Sent: 7/3/2025   2:43 PM CDT  To: Mary Rocha RN    Okay. Schedule it for that day.     Mary  ----- Message -----  From: Mary Rocha RN  Sent: 6/19/2025   1:43 PM CDT  To: Mary Piedra MD    Hi Dr Tamayo,      I received a message from Dr Dunham's office. Their first available for surgery is 3/26/26. If that works for you, could you send over the surgery request please.

## (undated) DIAGNOSIS — S30.1XXD ABDOMINAL WALL SEROMA, SUBSEQUENT ENCOUNTER: Primary | ICD-10-CM

## (undated) DIAGNOSIS — L02.211 ABDOMINAL WALL ABSCESS: ICD-10-CM

## (undated) DEVICE — MINOR GENERAL: Brand: MEDLINE INDUSTRIES, INC.

## (undated) DEVICE — CLIP RESOLUTION 360 235CM

## (undated) DEVICE — LAPAROTOMY SPONGE - RF AND X-RAY DETECTABLE PRE-WASHED: Brand: SITUATE

## (undated) DEVICE — SUT ETHILON 3-0 PS-2 1669H

## (undated) DEVICE — LIGACLIP MCA MULTIPLE CLIP APPLIERS, 30 MEDIUM CLIPS: Brand: LIGACLIP

## (undated) DEVICE — 3M™ STERI-STRIP™ REINFORCED ADHESIVE SKIN CLOSURES, R1548, 1 IN X 5 IN (25 MM X 125 MM), 4 STRIPS/ENVELOPE: Brand: 3M™ STERI-STRIP™

## (undated) DEVICE — KIT CLEAN ENDOKIT 1.1OZ GOWNX2

## (undated) DEVICE — SUT CHROMIC GUT 5-0 P-3 687G

## (undated) DEVICE — MEDI-VAC NON-CONDUCTIVE SUCTION TUBING 6MM X 1.8M (6FT.) L: Brand: CARDINAL HEALTH

## (undated) DEVICE — GOWN,SIRUS,FABRIC-REINFORCED,X-LARGE: Brand: MEDLINE

## (undated) DEVICE — SOLUTION IV ACD-ASOD CITR DEX

## (undated) DEVICE — SOL  .9 1000ML BTL

## (undated) DEVICE — EVACUATOR RELIAVAC 100CC

## (undated) DEVICE — SUT ETHIBOND 0 CT-2 CX27D

## (undated) DEVICE — SUT VICRYL 3-0 SH J416H

## (undated) DEVICE — CULTURE TUBE ANAEROBIC

## (undated) DEVICE — SUT MONOCRYL 4-0 PS-2 Y496G

## (undated) DEVICE — TRAY SRGPRP PVP IOD WT SCRB SM

## (undated) DEVICE — SUT SILK 3-0 SH K832H

## (undated) DEVICE — SUT SILK 2-0 FS 685G

## (undated) DEVICE — VIOLET BRAIDED (POLYGLACTIN 910), SYNTHETIC ABSORBABLE SUTURE: Brand: COATED VICRYL

## (undated) DEVICE — GAUZE SPONGES,8 PLY: Brand: CURITY

## (undated) DEVICE — Device: Brand: DUAL NARE NASAL CANNULAE FEMALE LUER CON 7FT O2 TUBE

## (undated) DEVICE — DRESSING BIOPATCH 1X4 BLUE

## (undated) DEVICE — SPECIMEN CONTAINER,POSITIVE SEAL INDICATOR, OR PACKAGED: Brand: PRECISION

## (undated) DEVICE — PEN SKIN MARKING REG TIP VIOLT

## (undated) DEVICE — COVER SGL STRL LGHT HNDL BLU

## (undated) DEVICE — SUT MONOCRYL 5-0 P-3 Y493G

## (undated) DEVICE — DRAPE SHEET LAPAROTOMY

## (undated) DEVICE — SUPER SPONGES,MEDIUM: Brand: KERLIX

## (undated) DEVICE — CULTURE COLLECT/TRANSPORT SYS

## (undated) DEVICE — SUT PROLENE 5-0 P-3 8698G

## (undated) DEVICE — SOLUTION  .9 1000ML BTL

## (undated) DEVICE — KIT ENDO ORCAPOD 160/180/190

## (undated) DEVICE — OCCLUSIVE GAUZE STRIP OVERWRAP,3% BISMUTH TRIBROMOPHENATE IN PETROLATUM BLEND: Brand: XEROFORM

## (undated) DEVICE — STERILE LATEX POWDER-FREE SURGICAL GLOVESWITH NITRILE COATING: Brand: PROTEXIS

## (undated) DEVICE — GAUZE SPONGES,12 PLY: Brand: CURITY

## (undated) DEVICE — TRAY SURESTEP 16 BARDEX UMETR

## (undated) DEVICE — FORCEPS BX L240CM DIA2.4MM L NDL RAD JAW 4

## (undated) DEVICE — SPONGE LAP 18X18 XRAY STRL

## (undated) DEVICE — FAN SPRAY KIT: Brand: PULSAVAC®

## (undated) DEVICE — DRESSING FOAM TOPIFOAM

## (undated) DEVICE — 60 ML SYRINGE REGULAR TIP: Brand: MONOJECT

## (undated) DEVICE — BANDAGE ROLL,100% COTTON, 6 PLY, LARGE: Brand: KERLIX

## (undated) DEVICE — STERILE POLYISOPRENE POWDER-FREE SURGICAL GLOVES: Brand: PROTEXIS

## (undated) DEVICE — SOL  .9 3000ML

## (undated) DEVICE — ABDOMINAL PAD: Brand: CURITY

## (undated) DEVICE — PLASTIC BREAST CDS-LF: Brand: MEDLINE INDUSTRIES, INC.

## (undated) DEVICE — PROXIMATE RH ROTATING HEAD SKIN STAPLERS (35 WIDE) CONTAINS 35 STAINLESS STEEL STAPLES: Brand: PROXIMATE

## (undated) DEVICE — SLEEVE KENDALL SCD EXPRESS MED

## (undated) DEVICE — MEGADYNE E-Z CLEAN BLADE 2.75"

## (undated) DEVICE — PENCIL ESURG 10FT 3/32IN SS

## (undated) DEVICE — ABDOMINAL PAD: Brand: DERMACEA

## (undated) DEVICE — #11 STERILE BLADE: Brand: POLYMER COATED BLADES

## (undated) DEVICE — SUT PROLENE 4-0 PS-2 8682G

## (undated) DEVICE — SUCTION CANISTER, 3000CC,SAFELINER: Brand: DEROYAL

## (undated) DEVICE — 1010 S-DRAPE TOWEL DRAPE 10/BX: Brand: STERI-DRAPE™

## (undated) DEVICE — 3M™ TEGADERM™ TRANSPARENT FILM DRESSING, 1626W, 4 IN X 4-3/4 IN (10 CM X 12 CM), 50 EACH/CARTON, 4 CARTON/CASE: Brand: 3M™ TEGADERM™

## (undated) DEVICE — REM POLYHESIVE ADULT PATIENT RETURN ELECTRODE: Brand: VALLEYLAB

## (undated) DEVICE — 3M™ IOBAN™ 2 ANTIMICROBIAL INCISE DRAPE 6648EZ: Brand: IOBAN™ 2

## (undated) NOTE — LETTER
Date: 6/15/2018    Patient Name: Alexi Members          To Whom it may concern: This letter has been written at the patient's request. The above patient was seen at the [facility] for treatment of a medical condition.     This patient

## (undated) NOTE — LETTER
Date: 1/4/2019    Patient Name: Alex Blanco          To Whom it may concern: This letter has been written at the patient's request. The above patient has been seen at the [facility] for ongoing treatment of a medical condition.

## (undated) NOTE — LETTER
Date: 7/10/2018    Patient Name: Alex Blanco          To Whom it may concern: This letter has been written at the patient's request. The above patient was seen at the [facility] for treatment of a medical condition.     This patient s

## (undated) NOTE — LETTER
Date: 4/30/2022    Patient Name: Josh Naranjo          To Whom it may concern: This letter has been written at the patient's request. The above patient was seen at the [facility] for treatment of a medical condition. This patient was here for a preoperative clearance visit today, 4/30. She is having surgery on 5/18/22.           Sincerely,    Isaac Figueroa, DO

## (undated) NOTE — LETTER
1/3/2019              [employer]         May work with restrictions. No overhead work right shoulder. Ground level work. No lifting, pushing, pulling more than 10lbs. Attend therapy.   Follow

## (undated) NOTE — LETTER
[facility], [address], [facility], [address] [place] [id number]  Dept: [phone]  Dr. Raghav Judd, DO      2019    RE:  Oli Herrera  : 1975      To Whom it May Yaneth

## (undated) NOTE — ED AVS SNAPSHOT
[facility] in [place] [address]    Phone:  [phone]           Renae Olson   MRN: [id number]    Department:  [facility]   Date of Visit:  5/21/2017 may not be covered by your plan. It is possible that the physician may not participate in your health insurance plan. This may result in a lower benefit level being available to you or other limited reimbursement.   The physician may seek payment directly If you have been prescribed any medication(s), please fill your prescription right away and begin taking the medication(s) as directed.   If you believe that any of the medications or instructions on this list is different from what your Primary Care doctor harming yourself, contact [facility] [place] at [phone]. - If you don’t have insurance, Dominik Bravo has partnered with [facility] [address] to help you get signed up for insurance coverage.   Patient [place]

## (undated) NOTE — LETTER
Date: 1/4/2019    Patient Name: Dinorah Villatoro          To Whom it may concern: This letter has been written at the patient's request. The above patient was seen at the [facility] for treatment of a medical condition.     This patient

## (undated) NOTE — LETTER
Date: 5/14/2018    Patient Name: Tee Mandujano          To Whom it may concern: This letter has been written at the patient's request. The above patient was seen at the [facility] for treatment of a medical condition.     This patient

## (undated) NOTE — LETTER
Hospital Discharge Documentation  Please phone to schedule a hospital follow up appointment.     From: [address] Hospitalist's Office  Phone: [phone]    Patient discharged time/date: 7/2/2018  5:07 PM  Patient discharge disposition:  [address] Will be discharged on bactrim- patient has script at home       Anemia of Chronic disease   Secondary to heavy menstrual bleeding   Discharge on iron     Quality:  · DVT Prophylaxis: heparin   · CODE status: Full code      Greater then 35 minutes spent. Please  your prescriptions at the location directed by your doctor or nurse    Bring a paper prescription for each of these medications  · hydrocodone-acetaminophen 7.5-325 MG Tabs         Follow up:      Follow-up Information     Attila Britton DO In · To experience mild discomforts such as sore lip or tongue, headache, cramps, gas pains or a bloated feeling in your abdomen. · To experience mild back pain or soreness for a day or two if you had spinal or epidural anesthesia.    · If you had laparoscop

## (undated) NOTE — LETTER
Date: 12/31/2019    Patient Name: Jef Winslow          To Whom it may concern:     This letter has been written at the patient's request. The above patient was seen at the [facility] for treatment of a medical condition.     Thi

## (undated) NOTE — LETTER
[address]  [facility] [place]       09/08/17        Patient: Gaynell Mcardle   YOB: 1975   Date of Visit: 9/8/2017       Dear  Dr. Pena Sender,      Thank you for referring Gaynell Mcardle to my pract

## (undated) NOTE — MR AVS SNAPSHOT
[address] at [address], [facility] 1  [phone]               Thank you for choosing us for your health care visit with Maryfrances Halsted, DO.   We are glad to serve you and happy to provide you with th Reduce overuse  Every time your foot strikes the ground, the plantar fascia is stretched. You can reduce the strain on the plantar fascia and the possibility of overuse by following these suggestions:  · Lose any excess weight.   · Avoid running on hard or Referral Details     Referred By    Referred To    Karuna Phillips, [address] [facility], [facility]   Phone:  [phone]   Fax:  [phone]    Diagnoses:  External hemorrhoids   Order:  Surgery - Internal    Kacie Nogueira MD   12 Instructions:   To schedule a test at any [facility], call Central Scheduling at [phone], Monday through Friday between 7:30am to 6pm and on Saturday between 8am and 1pm. Evening and weekend appointments for your exam are a 46year old female with chronic but worsening external hemorrhoids triggered by constipation, requesting consult for possible removal. Had banding in the past that did not work. Please evaluate and treat.     Assoc Dx:  External hemorrhoids [K64.4]

## (undated) NOTE — LETTER
[address]  [address] [place] [address]  Dept: [phone]      November 7, 2017    Patient: Maurice Soto   Date of Visit: 11/7/2017       To Whom It May Concern:    Samy Barber was seen and treated in our

## (undated) NOTE — LETTER
Date & Time: 8/10/2019, 11:16 AM  Patient: Jose Raul Suarez  Encounter Provider(s):    Sade Hitchcock MD       To Whom It May Concern:    Andrews Garza was seen and treated in our department on 8/10/2019. She can return to work 8/12/19.  Please excu

## (undated) NOTE — LETTER
[address] [place] [address]  Dept: [phone]  Dr. Kameron Brownlee, DO      Date: 1/2/2020     Patient Name: Fuchs Goldy              To Whom it may

## (undated) NOTE — LETTER
Date: 1/12/2022    Patient Name: Abhijit Leahy          To Whom it may concern: This letter has been written at the patient's request. The above patient was seen at the [facility] for treatment of a medical condition.     This patient s

## (undated) NOTE — LETTER
Date & Time: 12/19/2022, 11:57 PM  Patient: Abhijit Leahy  Encounter Provider(s):    Alma Sanchez MD       To Whom It May Concern:    Tara Meza was seen and treated in our department on 12/19/2022. She should not return to work until 12/22/22.     If you have any questions or concerns, please do not hesitate to call.        _____________________________  Physician/APC Signature

## (undated) NOTE — LETTER
[address]  [address] [place] [id number]  Dept: [phone]      June 19, 2017    Patient: Dinorah Villatoro   Date of Visit: 6/19/2017       To Whom It May Concern:    Gustavo Kim was seen and treated in our Im

## (undated) NOTE — LETTER
Date: 12/30/2019    Patient Name: Zaynab Law          To Whom it may concern: This letter has been written at the patient's request. The above patient was seen at the [facility] for treatment of a medical condition.     This

## (undated) NOTE — LETTER
Date: 10/5/2018    Patient Name: José Miguel Cárdenas          To Whom it may concern:     This letter has been written at the patient's request. The above patient has been seen at the [facility] for treatment of a medical condition.     This p

## (undated) NOTE — LETTER
[address]  [address] [place] [id number]  Dept: [phone]      July 10, 2017    Patient: Héctor Ricahrd   Date of Visit: 7/10/2017       To Whom It May Concern:    Elaine Cortes was seen and treated in our Im

## (undated) NOTE — ED AVS SNAPSHOT
[facility] in [facility]    [id number] [place], [place] [address]    Phone:  [phone]           Laila Brown   MRN: [id number]    Department:  [facility]   Date of Visit:  6/19/2017 breathing, shortness of breath, worsening symptoms despite treatment. ibuprofen 600 mg or 3 tablets every 6-8 hours as needed for baseline pain and swelling.         Discharge References/Attachments     SORE THROATS, SELF-CARE FOR (ENGLISH)    PHARYNGIT that Physician.   If you need additional assistance selecting a physician, you may call the [facility] Physician Referral and Class Registration line at [phone] or find a doctor online by visiting [email or web address].    IF THERE IS ANY BRAR [facility] [address]  [place] [address]) [phone]                Additional Information       We are concerned for your overall well being:    - If you are a smoker or have smoked in the last 12 months, we encourage you to explore op

## (undated) NOTE — LETTER
Date & Time: 4/16/2025, 8:47 PM  Patient: Christy LITTLE Nielsen  Encounter Provider(s):    Katelyn Byrd DO       To Whom It May Concern:    Christy Nielsen was seen and treated in our department on 4/16/2025. She can return to work 4/19/25.    If you have any questions or concerns, please do not hesitate to call.        _____________________________  Physician/APC Signature

## (undated) NOTE — LETTER
To Whom It May Concern:    Christy Nielsen has been under our care regarding ongoing medical issues. Because of this, she has been required to restrict her physical activities.    She may resume her usual activities, including work, on Monday, August 5, 2024 with the following restrictions:    []  None     []    No heavy lifting (over 15 pounds) for               weeks   []    Part-time (no more than             hours per week) for               week   [x]  Other: 6 hours/day.  She may need to take breaks for the next 2-week       Please feel free to contact us if there are any questions.      Sincerely,        Curtis Ziegler MD  [address] [id number] [phone]        Document generated by:  Curtis Ziegler MD

## (undated) NOTE — LETTER
Date & Time: 4/16/2025, 8:53 PM  Patient: Christy LITTLE Nielsen  Encounter Provider(s):    Katelyn Byrd DO       To Whom It May Concern:    Christy Nielsen was seen and treated in our department on 4/16/2025. She can return to work 4/21/25.    If you have any questions or concerns, please do not hesitate to call.        _____________________________  Physician/APC Signature

## (undated) NOTE — LETTER
Date: 3/30/2020    Patient Name: Héctor Richard          To Whom it may concern: The above patient was seen at the [facility] for treatment of a medical condition.     This patient should be excused from attending work from 3/27

## (undated) NOTE — LETTER
Date & Time: 8/21/2021, 1:37 PM  Patient: Renae Olson  Encounter Provider(s):    JESSICA Tavares       To Whom It May Concern:    Javid Bauer was seen and treated in our department on 8/21/2021.  She should not return to work until 08/23/20

## (undated) NOTE — LETTER
Date: 5/13/2019    Patient Name: Laila Brown          To Whom it may concern: This letter has been written at the patient's request. The above patient was seen at the [facility] for treatment of a medical condition.     This patient s

## (undated) NOTE — LETTER
Date: 8/24/2021    Patient Name: Yashira Thomas          To Whom it may concern: The above patient was seen at the [facility] for treatment of a medical condition.     This patient should be excused from attending work from 8/21

## (undated) NOTE — LETTER
Date: 5/8/2018    Patient Name: Alexi Members          To Whom it may concern: This letter has been written at the patient's request. The above patient was seen at the [facility] for treatment of a medical condition.     This patient

## (undated) NOTE — LETTER
[address]  Authorization for Surgical Operation and Procedure                                                                                           I hereby authorize Maren Burr MD, my physician and his/her assistants (if applicable), which may include medical students, residents, and/or fellows, to perform the following surgical operation/ procedure and administer such anesthesia as may be determined necessary by my physician: Operation/Procedure name (s) COLONOSCOPY on [address]   2. I recognize that during the surgical operation/procedure, unforeseen conditions may necessitate additional or different procedures than those listed above. I, therefore, further authorize and request that the above-named surgeon, assistants, or designees perform such procedures as are, in their judgment, necessary and desirable. 3.   My surgeon/physician has discussed prior to my surgery the potential benefits, risks and side effects of this procedure; the likelihood of achieving goals; and potential problems that might occur during recuperation. They also discussed reasonable alternatives to the procedure, including risks, benefits, and side effects related to the alternatives and risks related to not receiving this procedure. I have had all my questions answered and I acknowledge that no guarantee has been made as to the result that may be obtained. 4.   Should the need arise during my operation/procedure, which includes change of level of care prior to discharge, I also consent to the administration of blood and/or blood products. Further, I understand that despite careful testing and screening of blood or blood products by collecting agencies, I may still be subject to ill effects as a result of receiving a blood transfusion and/or blood products.   The following are some, but not all, of the potential risks that can occur: fever and allergic reactions, hemolytic reactions, transmission of diseases such as Hepatitis, AIDS and Cytomegalovirus (CMV) and fluid overload. In the event that I wish to have an autologous transfusion of my own blood, or a directed donor transfusion, I will discuss this with my physician. Check only if Refusing Blood or Blood Products  I understand refusal of blood or blood products as deemed necessary by my physician may have serious consequences to my condition to include possible death. I hereby assume responsibility for my refusal and release the hospital, its personnel, and my physicians from any responsibility for the consequences of my refusal.    o  Refuse   5. I authorize the use of any specimen, organs, tissues, body parts or foreign objects that may be removed from my body during the operation/procedure for diagnosis, research or teaching purposes and their subsequent disposal by hospital authorities. I also authorize the release of specimen test results and/or written reports to my treating physician on the hospital medical staff or other referring or consulting physicians involved in my care, at the discretion of the Pathologist or my treating physician. 6.   I consent to the photographing or videotaping of the operations or procedures to be performed, including appropriate portions of my body for medical, scientific, or educational purposes, provided my identity is not revealed by the pictures or by descriptive texts accompanying them. If the procedure has been photographed/videotaped, the surgeon will obtain the original picture, image, videotape or CD. The hospital will not be responsible for storage, release or maintenance of the picture, image, tape or CD.    7.   I consent to the presence of a  or observers in the operating room as deemed necessary by my physician or their designees.     8.   I recognize that in the event my procedure results in extended X-Ray/fluoroscopy time, I may develop a skin reaction. 9. If I have a Do Not Attempt Resuscitation (DNAR) order in place, that status will be suspended while in the operating room, procedural suite, and during the recovery period unless otherwise explicitly stated by me (or a person authorized to consent on my behalf). The surgeon or my attending physician will determine when the applicable recovery period ends for purposes of reinstating the DNAR order. 10. Patients having a sterilization procedure: I understand that if the procedure is successful the results will be permanent and it will therefore be impossible for me to inseminate, conceive, or bear children. I also understand that the procedure is intended to result in sterility, although the result has not been guaranteed. 11. I acknowledge that my physician has explained sedation/analgesia administration to me including the risk and benefits I consent to the administration of sedation/analgesia as may be necessary or desirable in the judgment of my physician. I CERTIFY THAT I HAVE READ AND FULLY UNDERSTAND THE ABOVE CONSENT TO OPERATION and/or OTHER PROCEDURE.     _________________________________________ _________________________________     ___________________________________  Signature of Patient     Signature of Responsible Person                   Printed Name of Responsible Person                              _________________________________________ ______________________________        ___________________________________  Signature of Witness         Date  Time         Relationship to Patient    STATEMENT OF PHYSICIAN My signature below affirms that prior to the time of the procedure; I have explained to the patient and/or his/her legal representative, the risks and benefits involved in the proposed treatment and any reasonable alternative to the proposed treatment.  I have also explained the risks and benefits involved in refusal of the proposed treatment and alternatives to the proposed treatment and have answered the patient's questions.  If I have a significant financial interest in a co-management agreement or a significant financial interest in any product or implant, or other significant relationship used in this procedure/surgery, I have disclosed this and had a discussion with my patient.     _______________________________________________________________ _____________________________  [place] of Physician)                                                                                         (Date)                                   (Time)  Patient Name: Aline Hennessy    : 1975   Printed: 11/15/2023      Medical Record #: [id number]                                              Page 1 of 1

## (undated) NOTE — LETTER
[address]  [address] [facility] [address]  Dept: [phone]      March 28, 2017    Patient: Pushpa Mckeon   Date of Visit: 3/28/2017       To Whom It May Concern:    Lowell Kamara was seen and treated in our I

## (undated) NOTE — LETTER
Date: 11/8/2018    Patient Name: Pushpa Mckeon          To Whom it may concern: This letter has been written at the patient's request. The above patient was seen at the [facility] for treatment of a medical condition.     This patient

## (undated) NOTE — MR AVS SNAPSHOT
[address] at [address], [place] [address]  [phone]               Thank you for choosing us for your health care visit with Jose Canseco DO.   We are glad to serve you and happy to provide you with th schedule your appointment. If you are confident that your benefit plan will not require a referral or authorization, such as [place] Medicaid, please feel free to schedule your appointment immediately.  However, if you are unsure about the requirements If taping helps, your healthcare provider may prescribe orthoses. Built from plaster casts of your feet, these inserts control the way your foot moves. As a result, your symptoms should go away.   Reduce overuse  Every time your foot strikes the ground, the Protein urine Negative Negative    Urobilinogen Urine 1.0 0.2-2.0    Nitrite Urine Negative Negative    Leukocyte esterase urine Negative Negative                  MyChart     Visit MyChart  You can access your MyChart to more actively manage your health

## (undated) NOTE — LETTER
Date & Time: 12/19/2022, 11:35 PM  Patient: Jaclyn Perez  Encounter Provider(s):    Danuta Smith MD       To Whom It May Concern:    Christina Silvestre was seen and treated in our department on 12/19/2022. She should not return to work until 12/21.     If you have any questions or concerns, please do not hesitate to call.        _____________________________  Physician/APC Signature

## (undated) NOTE — ED AVS SNAPSHOT
[facility] in [place] [address]    Phone:  [phone]           Maurice Soto   MRN: [id number]    Department:  [facility] in [facility]   Date of Visit:  3/28/2017 It is our goal to assure that you are completely satisfied with every aspect of your visit today.   In an effort to constantly improve our service to you, we would appreciate any positive or negative feedback related to the care you received in our [employer] account. You may have had testing done that requires us to contact you. Please make sure we have your correct phone number on file.      OUR CURRENT HOURS OF OPERATION:  MONDAY THROUGH FRIDAY 8AM - 8PM  WEEKENDS AND HOLIDAYS 8AM - 6PM    I certifi You can access your MyChart to more actively manage your health care and view more details from this visit by going to [email or web address].   If you've recently had a stay at the Hospital you can access your discharge instructions in [address] by norma

## (undated) NOTE — LETTER
Date: 6/26/2018    Patient Name: Laila Brown          To Whom it may concern: This letter has been written at the patient's request. The above patient was seen at the [facility] for treatment of a medical condition.     This patient

## (undated) NOTE — IP AVS SNAPSHOT
Patient Demographics     Address  [address] [place] [address] Phone  [phone] [facility])  [phone] (Work)  [phone] (Mobile) E-mail Address  [email or web address]      Emergency Contact(s)     Name Relation Home Work [address] [place] [address] · To experience mild back pain or soreness for a day or two if you had spinal or epidural anesthesia. · If you had laparoscopic surgery, to feel shoulder pain or discomfort on the day of surgery.    · For some patients to have nausea after surgery/anesthe or nurse    Bring a paper prescription for each of these medications  hydrocodone-acetaminophen 7.5-325 MG Tabs           [id number] - MAR ACTION REPORT  (last 24 hrs)    ** SITE UNKNOWN **     Order ID Medication Name Action Time Action Reason Comments Component Value Reference Range Flag Lab   Magnesium 1.7 1.8 - 2.5 mg/dL [facility]            BASIC METABOLIC PANEL (8) [[id number]] (Abnormal)  Resulted: 07/02/18 0533, Result status: Final result   Ordering provider:  Iwona Louis MD  07/01/18 230 9733 [facility] [address]  [facility] [place] [address] 04/29/14 1547 - Present            Microbiology Results (All)     Procedure Component Value Units Date/Time    Anaerobic Culture [[id number]] Collected:  06/28/18 relieving factors. She complains of subjective fevers as well.   Patient was admitted for radical debridement of the affected wound.[SA.2]    History:  Past Medical History:   Diagnosis Date   • Fibroids      Past Surgical History:  03/28/2018: Garett Sorensen Pulse:  [63-96] 67  Resp:  [16-23] 16  BP: (108-126)/(62-88) 112/62    General:  Alert and oriented. Diffuse skin problem:[SA.1] Large incision abdominal wall status post debridement[SA. 2]  Eye:  Pupils are equal, round and reactive to light, extraocular SA.2 - Iban Bar MD on 6/29/2018  6:51 AM                        Consults - MD Consult Notes      Consults signed by Isac Vieyra MD at 6/28/2018  7:31 PM     Author:  Isac Vieyra MD Service:  General Surgery Author Type:  Physician Cholecalciferol (VITAMIN D3) 33164 units Oral Tab Take 1 tablet by mouth once a week. Disp: 8 tablet Rfl: 0   Fluticasone Propionate 50 MCG/ACT Nasal Suspension 1 spray by Nasal route.  Disp:  Rfl:    Montelukast Sodium 10 MG Oral Tab Take 1 tablet (10 mg t HEENT: normocephalic; sclera anicteric, conjunctiva normal; no icterus  RESPIRATORY: clear to auscultation bilaterally  CARDIOVASCULAR: RRRABDOMEN: BS+, soft, nondistended; no HSM; no masses; no bruits; non tender  EXTREMITIES: no cyanosis, clubbing or elias pus.  I had extensive discussion with the patient as well as her 's etiology of the above.   Discussed need for surgical debridement of the wounds and drains wall abscesses removal foreign body including probable suture material.  I do not feel this Filed:  2018  3:47 PM Date of Service:  2018  3:44 PM Status:  Signed    :  Melanie Mendoza MD (Physician)       [facility]    Discharge Summary    Willy Edwin Patient Status:  Inpatient    1975 MRN [id number] 6/28/18 INCISION AND DRAINAGE Odalys Sanchez MD [facility] MAIN OR Comp        Pending Labs     Order Current Status    ANAEROBIC CULTURE Preliminary result          Discharge Plan:   Discharge Condition: Stable    Current Discharge Medication List [address]  [phone]                   Follow up Labs and imaging:          Other Discharge Instructions:        HOME INSTRUCTIONS  [facility]G HOME CARE INSTRUCTIONS: POST-OP ANESTHESIA  The medication that you received · Nausea should resolve in about 24 hours    If you have a problem when you are at home:    · Call your surgeons office         Time spent:  > 30 minutes    [address] [facility] [address]  7/2/2018[SH.1]    Electronically signed by Marty Salgado MD on 7/2/2018  3:47 PM

## (undated) NOTE — LETTER
[facility]  [address] [facility] [address]  [facility]: [phone]  FAX: [phone]    Medical Clearance Request    Leticia Angel DO  [address] [facility] [address]  [place]    The patient listed below is scheduled for surgery and needs the following pre-op labs and/or clearance prior to surgery. The patient has been instructed to schedule an appointment with you for a pre-op physical.      Patient: Dustin Herndon  : 1975    Surgeon:  Dr. Harley Kruse    Procedure: Exploration of umbilical wound with scar revision. Surgery Date:  22  Location:  [facility]    outpatient    [] Hematocrit/Hemogram [] EKG     [x] CBC    [] Chest X-Ray    [x] CMP    [] PT/PTT    [] Urinalysis   [] MRSA Nasal Culture    [] Urinalysis with reflex  [x] History and Physical    [] Urine pregnancy  [x] Medical Clearance    [] Qualitative HCG (blood) [] Cardiac Clearance      Please fax to [phone] when completed. Call [phone] with any questions. Thank you for your prompt attention to these requirements.     [address] [facility]

## (undated) NOTE — LETTER
Patient Name: Caroleen Fleischer        : 1975       Medical Record #: [id number]    CONSENT FOR PROCEDURES/SEDATION    Date: 2018       Time: 12:52 PM        1.  I authorize the performance upon Caroleen Fleischer the following:    Right Brooks

## (undated) NOTE — LETTER
Date: 1/12/2022    Patient Name: Sourav Mortensen          To Whom it may concern: The above patient was seen at the [facility] for treatment of a medical condition.     Due to a sensitive medical condition, Chris Alvarez is onl

## (undated) NOTE — IP AVS SNAPSHOT
[facility]            (For Outpatient Use Only) Initial Admit Date: 6/28/2018   Inpt/Obs Admit Date: Inpt: 6/28/18 / Obs: N/A   Discharge Date:    Orlando Proper:  [de-identified]   MRN: [de-identified]   CSN: [id number]        ENCOUNTER  Patient Class Subscriber ID:  Pt Rel to Subscriber:    Hospital Account Financial Class: [facility]    July 2, 2018

## (undated) NOTE — LETTER
Date: 5/22/2020    Patient Name: Kenia Allan          To Whom it may concern: [facility]     This patient should be excused from attending work from 5/18

## (undated) NOTE — LETTER
Date: 7/18/2017    Patient Name: Mary Jane Rocha          To Whom it may concern: This letter has been written at the patient's request. The above patient was seen at the [facility] for treatment of a medical condition.     Patient is t

## (undated) NOTE — LETTER
Date: 4/16/2018    Patient Name: Mack Puente          To Whom it may concern: This letter has been written at the patient's request. The above patient was seen at the [facility] for treatment of a medical condition.     This patient

## (undated) NOTE — LETTER
Date: 12/21/2022    Patient Name: Anastasiia Duncan          To Whom it may concern: The above patient was seen at the [facility] for treatment of a medical condition. This patient should be excused from attending work from 12/19 through 12/23 due to a medical condition. The patient may return to work on 1/3/23 without restrictions.          Sincerely,    Aminta Frazier, DO

## (undated) NOTE — LETTER
Date: 12/27/2018    Patient Name: Dinorah Villatoro          To Whom it may concern: This letter has been written at the patient's request. The above patient was seen at the [facility] for treatment of a medical condition.     This patien

## (undated) NOTE — LETTER
Date: 1/30/2018    Patient Name: Héctor Richard          To Whom it may concern: This letter has been written at the patient's request. The above patient was seen at the [facility] for treatment of a medical condition.     This patient